# Patient Record
Sex: FEMALE | Race: WHITE | NOT HISPANIC OR LATINO | Employment: OTHER | URBAN - NONMETROPOLITAN AREA
[De-identification: names, ages, dates, MRNs, and addresses within clinical notes are randomized per-mention and may not be internally consistent; named-entity substitution may affect disease eponyms.]

---

## 2017-03-06 ENCOUNTER — HOSPITAL ENCOUNTER (EMERGENCY)
Facility: HOSPITAL | Age: 82
Discharge: HOME | End: 2017-03-06
Attending: EMERGENCY MEDICINE | Admitting: EMERGENCY MEDICINE

## 2017-03-06 ENCOUNTER — APPOINTMENT (OUTPATIENT)
Dept: CT IMAGING | Facility: HOSPITAL | Age: 82
End: 2017-03-06

## 2017-03-06 VITALS
DIASTOLIC BLOOD PRESSURE: 71 MMHG | SYSTOLIC BLOOD PRESSURE: 148 MMHG | HEART RATE: 68 BPM | TEMPERATURE: 98.1 F | BODY MASS INDEX: 26.94 KG/M2 | RESPIRATION RATE: 18 BRPM | HEIGHT: 62 IN | OXYGEN SATURATION: 98 % | WEIGHT: 146.38 LBS
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
## 2017-03-06 DIAGNOSIS — S00.03XA SCALP CONTUSION: ICD-10-CM

## 2017-03-06 LAB
ALBUMIN SERPL-MCNC: 3.7 G/DL (ref 3.4–4.8)
ALBUMIN/GLOB SERPL: 1.4 G/DL (ref 1.5–2.5)
ALP SERPL-CCNC: 52 U/L (ref 35–104)
ALT SERPL W P-5'-P-CCNC: 13 U/L (ref 10–36)
ANION GAP SERPL CALCULATED.3IONS-SCNC: 2.1 MMOL/L (ref 3.6–11.2)
AST SERPL-CCNC: 21 U/L (ref 10–30)
BASOPHILS # BLD AUTO: 0.03 10*3/MM3 (ref 0–0.3)
BASOPHILS NFR BLD AUTO: 0.7 % (ref 0–2)
BILIRUB SERPL-MCNC: 0.3 MG/DL (ref 0.2–1.8)
BUN BLD-MCNC: 10 MG/DL (ref 7–21)
BUN/CREAT SERPL: 14.7 (ref 7–25)
CALCIUM SPEC-SCNC: 9.5 MG/DL (ref 7.7–10)
CHLORIDE SERPL-SCNC: 102 MMOL/L (ref 99–112)
CO2 SERPL-SCNC: 34.9 MMOL/L (ref 24.3–31.9)
CREAT BLD-MCNC: 0.68 MG/DL (ref 0.43–1.29)
DEPRECATED RDW RBC AUTO: 43.3 FL (ref 37–54)
EOSINOPHIL # BLD AUTO: 0.16 10*3/MM3 (ref 0–0.7)
EOSINOPHIL NFR BLD AUTO: 3.7 % (ref 0–7)
ERYTHROCYTE [DISTWIDTH] IN BLOOD BY AUTOMATED COUNT: 13.2 % (ref 11.5–14.5)
GFR SERPL CREATININE-BSD FRML MDRD: 83 ML/MIN/1.73
GLOBULIN UR ELPH-MCNC: 2.6 GM/DL
GLUCOSE BLD-MCNC: 82 MG/DL (ref 70–110)
HCT VFR BLD AUTO: 38.7 % (ref 37–47)
HGB BLD-MCNC: 12.3 G/DL (ref 12–16)
IMM GRANULOCYTES # BLD: 0.01 10*3/MM3 (ref 0–0.03)
IMM GRANULOCYTES NFR BLD: 0.2 % (ref 0–0.5)
INR PPP: 0.93 (ref 0.8–1.1)
LYMPHOCYTES # BLD AUTO: 1.42 10*3/MM3 (ref 1–3)
LYMPHOCYTES NFR BLD AUTO: 32.6 % (ref 16–46)
MCH RBC QN AUTO: 29.3 PG (ref 27–33)
MCHC RBC AUTO-ENTMCNC: 31.8 G/DL (ref 33–37)
MCV RBC AUTO: 92.1 FL (ref 80–94)
MONOCYTES # BLD AUTO: 0.5 10*3/MM3 (ref 0.1–0.9)
MONOCYTES NFR BLD AUTO: 11.5 % (ref 0–12)
NEUTROPHILS # BLD AUTO: 2.23 10*3/MM3 (ref 1.4–6.5)
NEUTROPHILS NFR BLD AUTO: 51.3 % (ref 40–75)
OSMOLALITY SERPL CALC.SUM OF ELEC: 275.7 MOSM/KG (ref 273–305)
PLATELET # BLD AUTO: 242 10*3/MM3 (ref 130–400)
PMV BLD AUTO: 8.2 FL (ref 6–10)
POTASSIUM BLD-SCNC: 3.9 MMOL/L (ref 3.5–5.3)
PROT SERPL-MCNC: 6.3 G/DL (ref 6–8)
PROTHROMBIN TIME: 10.3 SECONDS (ref 9.8–11.9)
RBC # BLD AUTO: 4.2 10*6/MM3 (ref 4.2–5.4)
SODIUM BLD-SCNC: 139 MMOL/L (ref 135–153)
WBC NRBC COR # BLD: 4.35 10*3/MM3 (ref 4.5–12.5)

## 2017-03-06 PROCEDURE — 99284 EMERGENCY DEPT VISIT MOD MDM: CPT

## 2017-03-06 PROCEDURE — 70450 CT HEAD/BRAIN W/O DYE: CPT

## 2017-03-06 PROCEDURE — 85610 PROTHROMBIN TIME: CPT | Performed by: EMERGENCY MEDICINE

## 2017-03-06 PROCEDURE — 80053 COMPREHEN METABOLIC PANEL: CPT | Performed by: EMERGENCY MEDICINE

## 2017-03-06 PROCEDURE — 72125 CT NECK SPINE W/O DYE: CPT | Performed by: RADIOLOGY

## 2017-03-06 PROCEDURE — 72125 CT NECK SPINE W/O DYE: CPT

## 2017-03-06 PROCEDURE — 36415 COLL VENOUS BLD VENIPUNCTURE: CPT

## 2017-03-06 PROCEDURE — 85025 COMPLETE CBC W/AUTO DIFF WBC: CPT | Performed by: EMERGENCY MEDICINE

## 2017-03-06 PROCEDURE — 70450 CT HEAD/BRAIN W/O DYE: CPT | Performed by: RADIOLOGY

## 2017-03-06 NOTE — ED NOTES
[facility] transported back to [facility]      Avinash Newell, ANTWAN  03/06/17 [id number]

## 2017-03-06 NOTE — ED NOTES
Attempted to call report to [facility]. States is busy and will call back.      Avinash Newell RN  03/06/17 3797

## 2017-03-06 NOTE — ED NOTES
0355: Aliyah from [facility] called back for report. Report given at this time.      Avinash Newell RN  03/06/17 0355

## 2017-03-06 NOTE — DISCHARGE INSTRUCTIONS

## 2017-03-08 NOTE — ED PROVIDER NOTES
Subjective   Patient is a 82 y.o. female presenting with fall.   Fall   Mechanism of injury: fall    Injury location:  Head/neck  Head/neck injury location:  Scalp  Incident location:  Bathroom  Arrived directly from scene: yes    Fall:     Fall occurred: Sitting On Toilet.    Impact surface:  Wall    Point of impact:  Head    Entrapped after fall: no    Protective equipment: none    Suspicion of alcohol use: no    Suspicion of drug use: no    Tetanus status:  Up to date  Prior to arrival data:     Bystander interventions:  None    Patient ambulatory at scene: yes      Blood loss:  None    Responsiveness at scene:  Alert    Orientation at scene:  Person    Loss of consciousness: no      Amnesic to event: no      Airway interventions:  None    Breathing interventions:  None    IV access status:  None    IO access:  None    Fluids administered:  None    Cardiac interventions:  None    Medications administered:  None    Immobilization:  None    Airway condition since incident:  Stable    Breathing condition since incident:  Stable    Circulation condition since incident:  Stable    Mental status condition since incident:  Stable    Disability condition since incident:  Stable  Associated symptoms: headaches and nausea    Associated symptoms: no abdominal pain, no back pain, no blindness, no chest pain, no difficulty breathing, no hearing loss, no neck pain, no seizures and no vomiting    Headaches:     Severity:  Mild    Onset quality:  Gradual    Timing:  Constant    Progression:  Improving    Chronicity:  New  Nausea:     Severity:  Mild    Onset quality:  Gradual    Timing:  Intermittent    Progression:  Partially resolved      Review of Systems   Constitutional: Negative for activity change, appetite change, chills, diaphoresis, fatigue and fever.   HENT: Negative for congestion, ear pain, hearing loss and sore throat.    Eyes: Negative for blindness and redness.   Respiratory: Negative for cough, chest tightness,  shortness of breath and wheezing.    Cardiovascular: Negative for chest pain, palpitations and leg swelling.   Gastrointestinal: Positive for nausea. Negative for abdominal pain, diarrhea and vomiting.   Genitourinary: Negative for dysuria and urgency.   Musculoskeletal: Negative for arthralgias, back pain, myalgias and neck pain.   Skin: Negative for pallor, rash and wound.   Neurological: Positive for headaches. Negative for dizziness, seizures, speech difficulty and weakness.   Psychiatric/Behavioral: Negative for agitation, behavioral problems, confusion and decreased concentration.       Past Medical History   Diagnosis Date   • Allergic rhinitis    • Arthritis    • COPD (chronic obstructive pulmonary disease)    • Dementia    • Depression    • Diverticulitis    • Dysphagia    • GERD (gastroesophageal reflux disease)    • Hypertension        Allergies   Allergen Reactions   • Contrast Dye    • Penicillins    • Sulfa Antibiotics        Past Surgical History   Procedure Laterality Date   • Hysterectomy     • Dental procedure     • Tonsillectomy     • Cholecystectomy     • Appendectomy     • Hemorrhoidectomy         Family History   Problem Relation Age of Onset   • No Known Problems Mother    • No Known Problems Father    • No Known Problems Sister    • No Known Problems Brother    • No Known Problems Son    • No Known Problems Daughter    • No Known Problems Maternal Grandmother    • No Known Problems Maternal Grandfather    • No Known Problems Paternal Grandmother    • No Known Problems Paternal Grandfather    • No Known Problems Cousin    • Rheum arthritis Neg Hx    • Osteoarthritis Neg Hx    • Asthma Neg Hx    • Diabetes Neg Hx    • Heart failure Neg Hx    • Hyperlipidemia Neg Hx    • Hypertension Neg Hx    • Migraines Neg Hx    • Rashes / Skin problems Neg Hx    • Seizures Neg Hx    • Stroke Neg Hx    • Thyroid disease Neg Hx        Social History     Social History   • Marital status:      Spouse  name: N/A   • Number of children: N/A   • Years of education: N/A     Social History Main Topics   • Smoking status: Former Smoker     Types: Cigarettes   • Smokeless tobacco: None   • Alcohol use No   • Drug use: No   • Sexual activity: Defer     Other Topics Concern   • None     Social History Narrative   • None           Objective   Physical Exam   Constitutional: She appears well-developed and well-nourished.  Non-toxic appearance. No distress.   HENT:   Head: Normocephalic. Head is with abrasion and with contusion.       Right Ear: External ear normal.   Left Ear: External ear normal.   Nose: Nose normal.   Mouth/Throat: Oropharynx is clear and moist and mucous membranes are normal. No oropharyngeal exudate. No tonsillar exudate.   Eyes: Conjunctivae, EOM and lids are normal. Pupils are equal, round, and reactive to light.   Neck: Normal range of motion and full passive range of motion without pain. Neck supple. No thyromegaly present.   Cardiovascular: Normal rate, regular rhythm, S1 normal, S2 normal, normal heart sounds, intact distal pulses and normal pulses.    Pulmonary/Chest: Effort normal and breath sounds normal. No tachypnea. No respiratory distress. She has no decreased breath sounds. She has no wheezes. She has no rales. She exhibits no tenderness.   Abdominal: Soft. Normal appearance and bowel sounds are normal. She exhibits no distension. There is no tenderness. There is no rebound and no guarding.   Musculoskeletal: Normal range of motion. She exhibits no edema, tenderness or deformity.   Lymphadenopathy:     She has no cervical adenopathy.   Neurological: She is alert. She has normal strength. No cranial nerve deficit or sensory deficit. GCS eye subscore is 4. GCS verbal subscore is 5. GCS motor subscore is 6.   Skin: Skin is warm, dry and intact. No rash noted. She is not diaphoretic. No erythema. No pallor.   Psychiatric: Her speech is normal. Cognition and memory are normal.   Nursing note  and vitals reviewed.      Procedures         ED Course  ED Course   Value Comment By Time   CT Cervical Spine Without Contrast IMPRESSION:  No acute fracture of the cervical spine.  Ike Wolfe MD 03/08 1340   CT Head Without Contrast IMPRESSION:  Atrophy and chronic small vessel ischemic change, but there  are no acute intracranial abnormalities identified.  Ike Wolfe MD 03/08 1341                  MDM  Number of Diagnoses or Management Options  Fall, initial encounter: new and requires workup  Scalp contusion: new and requires workup     Amount and/or Complexity of Data Reviewed  Tests in the radiology section of CPT®: ordered and reviewed  Independent visualization of images, tracings, or specimens: yes    Risk of Complications, Morbidity, and/or Mortality  Presenting problems: low  Diagnostic procedures: low  Management options: low    Patient Progress  Patient progress: stable      Final diagnoses:   Fall, initial encounter   Scalp contusion            Ike Wolfe MD  03/08/17 8573

## 2017-09-05 ENCOUNTER — TRANSCRIBE ORDERS (OUTPATIENT)
Dept: ADMINISTRATIVE | Facility: HOSPITAL | Age: 82
End: 2017-09-05

## 2017-09-05 DIAGNOSIS — M54.5 LOW BACK PAIN, UNSPECIFIED BACK PAIN LATERALITY, UNSPECIFIED CHRONICITY, WITH SCIATICA PRESENCE UNSPECIFIED: Primary | ICD-10-CM

## 2017-09-13 ENCOUNTER — HOSPITAL ENCOUNTER (OUTPATIENT)
Dept: CT IMAGING | Facility: HOSPITAL | Age: 82
Discharge: HOME | End: 2017-09-13
Attending: INTERNAL MEDICINE | Admitting: INTERNAL MEDICINE

## 2017-09-13 DIAGNOSIS — M54.5 LOW BACK PAIN, UNSPECIFIED BACK PAIN LATERALITY, UNSPECIFIED CHRONICITY, WITH SCIATICA PRESENCE UNSPECIFIED: ICD-10-CM

## 2017-09-13 PROCEDURE — 72131 CT LUMBAR SPINE W/O DYE: CPT

## 2017-09-13 PROCEDURE — 72131 CT LUMBAR SPINE W/O DYE: CPT | Performed by: RADIOLOGY

## 2017-11-29 ENCOUNTER — LAB REQUISITION (OUTPATIENT)
Dept: LAB | Facility: HOSPITAL | Age: 82
End: 2017-11-29

## 2017-11-29 DIAGNOSIS — R10.9 ABDOMINAL PAIN: ICD-10-CM

## 2017-11-29 DIAGNOSIS — D64.9 ANEMIA: ICD-10-CM

## 2017-11-29 DIAGNOSIS — R19.7 DIARRHEA: ICD-10-CM

## 2017-11-29 LAB
027 TOXIN: NORMAL
C DIFF TOX GENS STL QL NAA+PROBE: NEGATIVE
HEMOCCULT STL QL: NEGATIVE

## 2017-11-29 PROCEDURE — 87045 FECES CULTURE AEROBIC BACT: CPT | Performed by: INTERNAL MEDICINE

## 2017-11-29 PROCEDURE — 87899 AGENT NOS ASSAY W/OPTIC: CPT | Performed by: INTERNAL MEDICINE

## 2017-11-29 PROCEDURE — 87209 SMEAR COMPLEX STAIN: CPT | Performed by: INTERNAL MEDICINE

## 2017-11-29 PROCEDURE — 87493 C DIFF AMPLIFIED PROBE: CPT | Performed by: INTERNAL MEDICINE

## 2017-11-29 PROCEDURE — 87177 OVA AND PARASITES SMEARS: CPT | Performed by: INTERNAL MEDICINE

## 2017-11-29 PROCEDURE — 87046 STOOL CULTR AEROBIC BACT EA: CPT | Performed by: INTERNAL MEDICINE

## 2017-11-29 PROCEDURE — 82272 OCCULT BLD FECES 1-3 TESTS: CPT | Performed by: INTERNAL MEDICINE

## 2017-12-01 LAB
BACTERIA SPEC AEROBE CULT: NORMAL
O+P SPEC MICRO: NORMAL
OVA + PARASITE RESULT 1: NORMAL

## 2017-12-15 ENCOUNTER — LAB REQUISITION (OUTPATIENT)
Dept: LAB | Facility: HOSPITAL | Age: 82
End: 2017-12-15

## 2017-12-15 DIAGNOSIS — J02.9 ACUTE PHARYNGITIS: ICD-10-CM

## 2017-12-15 LAB — S PYO AG THROAT QL: NEGATIVE

## 2017-12-15 PROCEDURE — 87081 CULTURE SCREEN ONLY: CPT | Performed by: INTERNAL MEDICINE

## 2017-12-15 PROCEDURE — 87880 STREP A ASSAY W/OPTIC: CPT | Performed by: INTERNAL MEDICINE

## 2017-12-17 LAB — BACTERIA SPEC AEROBE CULT: NORMAL

## 2018-01-01 ENCOUNTER — APPOINTMENT (OUTPATIENT)
Dept: GENERAL RADIOLOGY | Facility: HOSPITAL | Age: 83
End: 2018-01-01

## 2018-01-01 ENCOUNTER — OFFICE VISIT (OUTPATIENT)
Dept: GASTROENTEROLOGY | Facility: CLINIC | Age: 83
End: 2018-01-01

## 2018-01-01 ENCOUNTER — HOSPITAL ENCOUNTER (OUTPATIENT)
Dept: GENERAL RADIOLOGY | Facility: HOSPITAL | Age: 83
Discharge: HOME | End: 2018-08-07
Attending: INTERNAL MEDICINE | Admitting: INTERNAL MEDICINE

## 2018-01-01 ENCOUNTER — HOSPITAL ENCOUNTER (EMERGENCY)
Facility: HOSPITAL | Age: 83
Discharge: HOME | End: 2018-10-03
Attending: EMERGENCY MEDICINE | Admitting: EMERGENCY MEDICINE

## 2018-01-01 ENCOUNTER — APPOINTMENT (OUTPATIENT)
Dept: CT IMAGING | Facility: HOSPITAL | Age: 83
End: 2018-01-01

## 2018-01-01 ENCOUNTER — TELEPHONE (OUTPATIENT)
Dept: GASTROENTEROLOGY | Facility: CLINIC | Age: 83
End: 2018-01-01

## 2018-01-01 ENCOUNTER — CONSULT (OUTPATIENT)
Dept: GASTROENTEROLOGY | Facility: CLINIC | Age: 83
End: 2018-01-01

## 2018-01-01 ENCOUNTER — ANESTHESIA (OUTPATIENT)
Dept: PERIOP | Facility: HOSPITAL | Age: 83
End: 2018-01-01

## 2018-01-01 ENCOUNTER — HOSPITAL ENCOUNTER (OUTPATIENT)
Dept: GENERAL RADIOLOGY | Facility: HOSPITAL | Age: 83
Discharge: HOME | End: 2018-04-27
Admitting: PHYSICIAN ASSISTANT

## 2018-01-01 ENCOUNTER — ANESTHESIA EVENT (OUTPATIENT)
Dept: PERIOP | Facility: HOSPITAL | Age: 83
End: 2018-01-01

## 2018-01-01 ENCOUNTER — TRANSCRIBE ORDERS (OUTPATIENT)
Dept: ADMINISTRATIVE | Facility: HOSPITAL | Age: 83
End: 2018-01-01

## 2018-01-01 VITALS
HEART RATE: 74 BPM | WEIGHT: 116 LBS | DIASTOLIC BLOOD PRESSURE: 61 MMHG | HEIGHT: 62 IN | SYSTOLIC BLOOD PRESSURE: 118 MMHG | BODY MASS INDEX: 21.35 KG/M2 | OXYGEN SATURATION: 92 %

## 2018-01-01 VITALS
OXYGEN SATURATION: 100 % | DIASTOLIC BLOOD PRESSURE: 69 MMHG | SYSTOLIC BLOOD PRESSURE: 112 MMHG | RESPIRATION RATE: 20 BRPM | HEART RATE: 82 BPM | TEMPERATURE: 98.7 F

## 2018-01-01 VITALS
HEART RATE: 77 BPM | BODY MASS INDEX: 20.8 KG/M2 | DIASTOLIC BLOOD PRESSURE: 73 MMHG | WEIGHT: 113 LBS | HEIGHT: 62 IN | SYSTOLIC BLOOD PRESSURE: 136 MMHG

## 2018-01-01 VITALS
DIASTOLIC BLOOD PRESSURE: 74 MMHG | TEMPERATURE: 98 F | HEART RATE: 64 BPM | WEIGHT: 115.6 LBS | SYSTOLIC BLOOD PRESSURE: 125 MMHG | RESPIRATION RATE: 18 BRPM | OXYGEN SATURATION: 98 % | HEIGHT: 63 IN | BODY MASS INDEX: 20.48 KG/M2

## 2018-01-01 DIAGNOSIS — R13.10 DYSPHAGIA, UNSPECIFIED TYPE: Primary | ICD-10-CM

## 2018-01-01 DIAGNOSIS — Z87.19 HISTORY OF HIATAL HERNIA: ICD-10-CM

## 2018-01-01 DIAGNOSIS — R63.4 WEIGHT LOSS, ABNORMAL: ICD-10-CM
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
## 2018-01-01 DIAGNOSIS — R13.19 ESOPHAGEAL DYSPHAGIA: ICD-10-CM

## 2018-01-01 DIAGNOSIS — R13.10 DYSPHAGIA, UNSPECIFIED TYPE: ICD-10-CM

## 2018-01-01 DIAGNOSIS — R63.0 POOR APPETITE: ICD-10-CM

## 2018-01-01 DIAGNOSIS — S09.90XA INJURY OF HEAD, INITIAL ENCOUNTER: ICD-10-CM

## 2018-01-01 DIAGNOSIS — R10.84 GENERALIZED ABDOMINAL PAIN: ICD-10-CM

## 2018-01-01 DIAGNOSIS — K21.9 GASTROESOPHAGEAL REFLUX DISEASE, ESOPHAGITIS PRESENCE NOT SPECIFIED: ICD-10-CM

## 2018-01-01 DIAGNOSIS — M54.50 ACUTE LOW BACK PAIN WITHOUT SCIATICA, UNSPECIFIED BACK PAIN LATERALITY: Primary | ICD-10-CM

## 2018-01-01 DIAGNOSIS — R19.7 DIARRHEA, UNSPECIFIED TYPE: ICD-10-CM

## 2018-01-01 DIAGNOSIS — R15.9 INCONTINENCE OF FECES WITH FECAL URGENCY: ICD-10-CM

## 2018-01-01 DIAGNOSIS — R11.2 INTRACTABLE VOMITING WITH NAUSEA, UNSPECIFIED VOMITING TYPE: ICD-10-CM

## 2018-01-01 DIAGNOSIS — R11.2 INTRACTABLE VOMITING WITH NAUSEA, UNSPECIFIED VOMITING TYPE: Primary | ICD-10-CM

## 2018-01-01 DIAGNOSIS — R15.2 INCONTINENCE OF FECES WITH FECAL URGENCY: ICD-10-CM

## 2018-01-01 DIAGNOSIS — K22.4 ESOPHAGEAL DYSMOTILITY: Primary | ICD-10-CM

## 2018-01-01 LAB
ANION GAP SERPL CALCULATED.3IONS-SCNC: 10 MMOL/L (ref 3.6–11.2)
ANION GAP SERPL CALCULATED.3IONS-SCNC: 21 MMOL/L (ref 3.6–11.2)
BASOPHILS # BLD AUTO: 0.01 10*3/MM3 (ref 0–0.3)
BASOPHILS NFR BLD AUTO: 0.2 % (ref 0–2)
BUN BLD-MCNC: 11 MG/DL (ref 7–21)
BUN BLD-MCNC: 13 MG/DL (ref 7–21)
BUN/CREAT SERPL: 18.6 (ref 7–25)
BUN/CREAT SERPL: 22.8 (ref 7–25)
CALCIUM SPEC-SCNC: 8.6 MG/DL (ref 7.7–10)
CALCIUM SPEC-SCNC: 8.8 MG/DL (ref 7.7–10)
CHLORIDE SERPL-SCNC: 104 MMOL/L (ref 99–112)
CHLORIDE SERPL-SCNC: 93 MMOL/L (ref 99–112)
CO2 SERPL-SCNC: 24 MMOL/L (ref 24.3–31.9)
CO2 SERPL-SCNC: 24 MMOL/L (ref 24.3–31.9)
CREAT BLD-MCNC: 0.57 MG/DL (ref 0.43–1.29)
CREAT BLD-MCNC: 0.59 MG/DL (ref 0.43–1.29)
CRP SERPL-MCNC: <0.5 MG/DL (ref 0–0.99)
DEPRECATED RDW RBC AUTO: 44.9 FL (ref 37–54)
DEPRECATED RDW RBC AUTO: 45.1 FL (ref 37–54)
EOSINOPHIL # BLD AUTO: 0.03 10*3/MM3 (ref 0–0.7)
EOSINOPHIL NFR BLD AUTO: 0.7 % (ref 0–7)
ERYTHROCYTE [DISTWIDTH] IN BLOOD BY AUTOMATED COUNT: 13.9 % (ref 11.5–14.5)
ERYTHROCYTE [DISTWIDTH] IN BLOOD BY AUTOMATED COUNT: 14.6 % (ref 11.5–14.5)
GFR SERPL CREATININE-BSD FRML MDRD: 102 ML/MIN/1.73
GFR SERPL CREATININE-BSD FRML MDRD: 97 ML/MIN/1.73
GLUCOSE BLD-MCNC: 74 MG/DL (ref 70–110)
GLUCOSE BLD-MCNC: 76 MG/DL (ref 70–110)
GLUCOSE BLDC GLUCOMTR-MCNC: 69 MG/DL (ref 70–130)
GLUCOSE BLDC GLUCOMTR-MCNC: 80 MG/DL (ref 70–130)
GLUCOSE BLDC GLUCOMTR-MCNC: 82 MG/DL (ref 70–130)
GLUCOSE BLDC GLUCOMTR-MCNC: 84 MG/DL (ref 70–130)
GLUCOSE BLDC GLUCOMTR-MCNC: 85 MG/DL (ref 70–130)
GLUCOSE BLDC GLUCOMTR-MCNC: 89 MG/DL (ref 70–130)
GLUCOSE BLDC GLUCOMTR-MCNC: 96 MG/DL (ref 70–130)
GLUCOSE BLDC GLUCOMTR-MCNC: 96 MG/DL (ref 70–130)
GLUCOSE BLDC GLUCOMTR-MCNC: 99 MG/DL (ref 70–130)
HCT VFR BLD AUTO: 33.7 % (ref 37–47)
HCT VFR BLD AUTO: 36.5 % (ref 37–47)
HGB BLD-MCNC: 10.9 G/DL (ref 12–16)
HGB BLD-MCNC: 11.4 G/DL (ref 12–16)
IMM GRANULOCYTES # BLD: 0.01 10*3/MM3 (ref 0–0.03)
IMM GRANULOCYTES NFR BLD: 0.2 % (ref 0–0.5)
LAB AP CASE REPORT: NORMAL
LYMPHOCYTES # BLD AUTO: 1.5 10*3/MM3 (ref 1–3)
LYMPHOCYTES NFR BLD AUTO: 36.9 % (ref 16–46)
Lab: NORMAL
MAGNESIUM SERPL-MCNC: 1.5 MG/DL (ref 1.7–2.6)
MCH RBC QN AUTO: 28.1 PG (ref 27–33)
MCH RBC QN AUTO: 28.6 PG (ref 27–33)
MCHC RBC AUTO-ENTMCNC: 31.2 G/DL (ref 33–37)
MCHC RBC AUTO-ENTMCNC: 32.3 G/DL (ref 33–37)
MCV RBC AUTO: 88.5 FL (ref 80–94)
MCV RBC AUTO: 90.1 FL (ref 80–94)
MONOCYTES # BLD AUTO: 0.5 10*3/MM3 (ref 0.1–0.9)
MONOCYTES NFR BLD AUTO: 12.3 % (ref 0–12)
NEUTROPHILS # BLD AUTO: 2.02 10*3/MM3 (ref 1.4–6.5)
NEUTROPHILS NFR BLD AUTO: 49.7 % (ref 40–75)
OSMOLALITY SERPL CALC.SUM OF ELEC: 273.8 MOSM/KG (ref 273–305)
OSMOLALITY SERPL CALC.SUM OF ELEC: 274.4 MOSM/KG (ref 273–305)
PATH REPORT.FINAL DX SPEC: NORMAL
PHOSPHATE SERPL-MCNC: 3.3 MG/DL (ref 2.7–4.5)
PLATELET # BLD AUTO: 211 10*3/MM3 (ref 130–400)
PLATELET # BLD AUTO: 222 10*3/MM3 (ref 130–400)
PMV BLD AUTO: 10 FL (ref 6–10)
PMV BLD AUTO: 10.9 FL (ref 6–10)
POTASSIUM BLD-SCNC: 3 MMOL/L (ref 3.5–5.3)
POTASSIUM BLD-SCNC: 3.5 MMOL/L (ref 3.5–5.3)
POTASSIUM BLD-SCNC: 3.5 MMOL/L (ref 3.5–5.3)
POTASSIUM BLD-SCNC: 3.7 MMOL/L (ref 3.5–5.3)
POTASSIUM BLD-SCNC: 3.7 MMOL/L (ref 3.5–5.3)
RBC # BLD AUTO: 3.81 10*6/MM3 (ref 4.2–5.4)
RBC # BLD AUTO: 4.05 10*6/MM3 (ref 4.2–5.4)
SODIUM BLD-SCNC: 138 MMOL/L (ref 135–153)
SODIUM BLD-SCNC: 138 MMOL/L (ref 135–153)
WBC NRBC COR # BLD: 4.07 10*3/MM3 (ref 4.5–12.5)
WBC NRBC COR # BLD: 7.7 10*3/MM3 (ref 4.5–12.5)

## 2018-01-01 PROCEDURE — 97530 THERAPEUTIC ACTIVITIES: CPT

## 2018-01-01 PROCEDURE — 80048 BASIC METABOLIC PNL TOTAL CA: CPT | Performed by: INTERNAL MEDICINE

## 2018-01-01 PROCEDURE — 99214 OFFICE O/P EST MOD 30 MIN: CPT | Performed by: PHYSICIAN ASSISTANT

## 2018-01-01 PROCEDURE — 80048 BASIC METABOLIC PNL TOTAL CA: CPT | Performed by: NURSE PRACTITIONER

## 2018-01-01 PROCEDURE — 84132 ASSAY OF SERUM POTASSIUM: CPT | Performed by: INTERNAL MEDICINE

## 2018-01-01 PROCEDURE — 85025 COMPLETE CBC W/AUTO DIFF WBC: CPT | Performed by: NURSE PRACTITIONER

## 2018-01-01 PROCEDURE — 74230 X-RAY XM SWLNG FUNCJ C+: CPT

## 2018-01-01 PROCEDURE — 99213 OFFICE O/P EST LOW 20 MIN: CPT | Performed by: PHYSICIAN ASSISTANT

## 2018-01-01 PROCEDURE — 74018 RADEX ABDOMEN 1 VIEW: CPT

## 2018-01-01 PROCEDURE — G8996 SWALLOW CURRENT STATUS: HCPCS

## 2018-01-01 PROCEDURE — 25010000002 ONDANSETRON PER 1 MG: Performed by: EMERGENCY MEDICINE

## 2018-01-01 PROCEDURE — 97116 GAIT TRAINING THERAPY: CPT

## 2018-01-01 PROCEDURE — 25010000002 PROPOFOL 1000 MG/ML EMULSION: Performed by: NURSE ANESTHETIST, CERTIFIED REGISTERED

## 2018-01-01 PROCEDURE — G8998 SWALLOW D/C STATUS: HCPCS

## 2018-01-01 PROCEDURE — 25010000002 MORPHINE SULFATE (PF) 2 MG/ML SOLUTION: Performed by: EMERGENCY MEDICINE

## 2018-01-01 PROCEDURE — 83735 ASSAY OF MAGNESIUM: CPT | Performed by: INTERNAL MEDICINE

## 2018-01-01 PROCEDURE — G8997 SWALLOW GOAL STATUS: HCPCS

## 2018-01-01 PROCEDURE — 82962 GLUCOSE BLOOD TEST: CPT

## 2018-01-01 PROCEDURE — 43248 EGD GUIDE WIRE INSERTION: CPT | Performed by: INTERNAL MEDICINE

## 2018-01-01 PROCEDURE — 85027 COMPLETE CBC AUTOMATED: CPT | Performed by: INTERNAL MEDICINE

## 2018-01-01 PROCEDURE — 72170 X-RAY EXAM OF PELVIS: CPT

## 2018-01-01 PROCEDURE — 74220 X-RAY XM ESOPHAGUS 1CNTRST: CPT

## 2018-01-01 PROCEDURE — 70450 CT HEAD/BRAIN W/O DYE: CPT | Performed by: RADIOLOGY

## 2018-01-01 PROCEDURE — 74230 X-RAY XM SWLNG FUNCJ C+: CPT | Performed by: RADIOLOGY

## 2018-01-01 PROCEDURE — 74220 X-RAY XM ESOPHAGUS 1CNTRST: CPT | Performed by: RADIOLOGY

## 2018-01-01 PROCEDURE — 84100 ASSAY OF PHOSPHORUS: CPT | Performed by: INTERNAL MEDICINE

## 2018-01-01 PROCEDURE — 71045 X-RAY EXAM CHEST 1 VIEW: CPT

## 2018-01-01 PROCEDURE — 72170 X-RAY EXAM OF PELVIS: CPT | Performed by: RADIOLOGY

## 2018-01-01 PROCEDURE — 70450 CT HEAD/BRAIN W/O DYE: CPT

## 2018-01-01 PROCEDURE — 72110 X-RAY EXAM L-2 SPINE 4/>VWS: CPT | Performed by: RADIOLOGY

## 2018-01-01 PROCEDURE — 88305 TISSUE EXAM BY PATHOLOGIST: CPT | Performed by: INTERNAL MEDICINE

## 2018-01-01 PROCEDURE — 99284 EMERGENCY DEPT VISIT MOD MDM: CPT

## 2018-01-01 PROCEDURE — 92611 MOTION FLUOROSCOPY/SWALLOW: CPT

## 2018-01-01 PROCEDURE — 72110 X-RAY EXAM L-2 SPINE 4/>VWS: CPT

## 2018-01-01 PROCEDURE — 86140 C-REACTIVE PROTEIN: CPT | Performed by: NURSE PRACTITIONER

## 2018-01-01 PROCEDURE — 88312 SPECIAL STAINS GROUP 1: CPT | Performed by: INTERNAL MEDICINE

## 2018-01-01 PROCEDURE — 45378 DIAGNOSTIC COLONOSCOPY: CPT | Performed by: INTERNAL MEDICINE

## 2018-01-01 PROCEDURE — 71045 X-RAY EXAM CHEST 1 VIEW: CPT | Performed by: RADIOLOGY

## 2018-01-01 PROCEDURE — 74018 RADEX ABDOMEN 1 VIEW: CPT | Performed by: RADIOLOGY

## 2018-01-01 PROCEDURE — 43239 EGD BIOPSY SINGLE/MULTIPLE: CPT | Performed by: INTERNAL MEDICINE

## 2018-01-01 PROCEDURE — 96372 THER/PROPH/DIAG INJ SC/IM: CPT

## 2018-01-01 RX ORDER — ONDANSETRON 2 MG/ML
4 INJECTION INTRAMUSCULAR; INTRAVENOUS ONCE
Status: COMPLETED | OUTPATIENT
Start: 2018-01-01 | End: 2018-01-01

## 2018-01-01 RX ORDER — SODIUM CHLORIDE 0.9 % (FLUSH) 0.9 %
1-10 SYRINGE (ML) INJECTION AS NEEDED
Status: DISCONTINUED | OUTPATIENT
Start: 2018-01-01 | End: 2018-01-01 | Stop reason: HOSPADM

## 2018-01-01 RX ORDER — MORPHINE SULFATE 2 MG/ML
4 INJECTION, SOLUTION INTRAMUSCULAR; INTRAVENOUS ONCE
Status: COMPLETED | OUTPATIENT
Start: 2018-01-01 | End: 2018-01-01

## 2018-01-01 RX ORDER — IPRATROPIUM BROMIDE AND ALBUTEROL SULFATE 2.5; .5 MG/3ML; MG/3ML
3 SOLUTION RESPIRATORY (INHALATION) ONCE AS NEEDED
Status: DISCONTINUED | OUTPATIENT
Start: 2018-01-01 | End: 2018-01-01 | Stop reason: HOSPADM

## 2018-01-01 RX ORDER — MEPERIDINE HYDROCHLORIDE 25 MG/ML
12.5 INJECTION INTRAMUSCULAR; INTRAVENOUS; SUBCUTANEOUS
Status: DISCONTINUED | OUTPATIENT
Start: 2018-01-01 | End: 2018-01-01 | Stop reason: HOSPADM

## 2018-01-01 RX ORDER — MORPHINE SULFATE 2 MG/ML
2 INJECTION, SOLUTION INTRAMUSCULAR; INTRAVENOUS ONCE
Status: DISCONTINUED | OUTPATIENT
Start: 2018-01-01 | End: 2018-01-01

## 2018-01-01 RX ORDER — METOPROLOL SUCCINATE 25 MG/1
12.5 TABLET, EXTENDED RELEASE ORAL DAILY
COMMUNITY

## 2018-01-01 RX ORDER — FENTANYL CITRATE 50 UG/ML
50 INJECTION, SOLUTION INTRAMUSCULAR; INTRAVENOUS
Status: DISCONTINUED | OUTPATIENT
Start: 2018-01-01 | End: 2018-01-01 | Stop reason: HOSPADM

## 2018-01-01 RX ORDER — OXYCODONE HYDROCHLORIDE AND ACETAMINOPHEN 5; 325 MG/1; MG/1
1 TABLET ORAL ONCE AS NEEDED
Status: DISCONTINUED | OUTPATIENT
Start: 2018-01-01 | End: 2018-01-01 | Stop reason: HOSPADM

## 2018-01-01 RX ORDER — SODIUM CHLORIDE, SODIUM LACTATE, POTASSIUM CHLORIDE, CALCIUM CHLORIDE 600; 310; 30; 20 MG/100ML; MG/100ML; MG/100ML; MG/100ML
125 INJECTION, SOLUTION INTRAVENOUS CONTINUOUS
Status: DISCONTINUED | OUTPATIENT
Start: 2018-01-01 | End: 2018-01-01 | Stop reason: HOSPADM

## 2018-01-01 RX ORDER — ONDANSETRON 2 MG/ML
4 INJECTION INTRAMUSCULAR; INTRAVENOUS ONCE AS NEEDED
Status: DISCONTINUED | OUTPATIENT
Start: 2018-01-01 | End: 2018-01-01 | Stop reason: HOSPADM

## 2018-01-01 RX ORDER — ONDANSETRON 2 MG/ML
4 INJECTION INTRAMUSCULAR; INTRAVENOUS ONCE
Status: DISCONTINUED | OUTPATIENT
Start: 2018-01-01 | End: 2018-01-01

## 2018-01-01 RX ORDER — RANITIDINE 300 MG/1
300 TABLET ORAL 2 TIMES DAILY
Qty: 60 TABLET | Refills: 5 | Status: SHIPPED | OUTPATIENT
Start: 2018-01-01

## 2018-01-01 RX ADMIN — MORPHINE SULFATE 4 MG: 2 INJECTION, SOLUTION INTRAMUSCULAR; INTRAVENOUS at 13:48

## 2018-01-01 RX ADMIN — ONDANSETRON 4 MG: 2 INJECTION, SOLUTION INTRAMUSCULAR; INTRAVENOUS at 13:48

## 2018-01-01 RX ADMIN — PROPOFOL 100 MCG/KG/MIN: 10 INJECTION, EMULSION INTRAVENOUS at 15:44

## 2018-01-22 ENCOUNTER — APPOINTMENT (OUTPATIENT)
Dept: CT IMAGING | Facility: HOSPITAL | Age: 83
End: 2018-01-22

## 2018-01-22 ENCOUNTER — HOSPITAL ENCOUNTER (INPATIENT)
Facility: HOSPITAL | Age: 83
LOS: 5 days | Discharge: NURSING, REHAB OR LONG-TERM CARE | End: 2018-01-27
Attending: EMERGENCY MEDICINE | Admitting: INTERNAL MEDICINE

## 2018-01-22 ENCOUNTER — APPOINTMENT (OUTPATIENT)
Dept: GENERAL RADIOLOGY | Facility: HOSPITAL | Age: 83
End: 2018-01-22

## 2018-01-22 DIAGNOSIS — A41.9 SEPSIS, DUE TO UNSPECIFIED ORGANISM: ICD-10-CM

## 2018-01-22 DIAGNOSIS — J96.02 ACUTE RESPIRATORY FAILURE WITH HYPOXIA AND HYPERCAPNIA (HCC): ICD-10-CM

## 2018-01-22 DIAGNOSIS — J18.9 PNEUMONIA OF LEFT LOWER LOBE DUE TO INFECTIOUS ORGANISM: ICD-10-CM

## 2018-01-22 DIAGNOSIS — K94.23 MALFUNCTION OF GASTROSTOMY TUBE (HCC): ICD-10-CM

## 2018-01-22 DIAGNOSIS — R41.82 ALTERED MENTAL STATUS, UNSPECIFIED ALTERED MENTAL STATUS TYPE: ICD-10-CM

## 2018-01-22 DIAGNOSIS — T85.528A DISLODGED GASTROSTOMY TUBE: Primary | ICD-10-CM

## 2018-01-22 DIAGNOSIS — J96.01 ACUTE RESPIRATORY FAILURE WITH HYPOXIA AND HYPERCAPNIA (HCC): ICD-10-CM

## 2018-01-22 LAB
027 TOXIN: NORMAL
A-A DO2: 123.3 MMHG (ref 0–300)
A-A DO2: 194 MMHG (ref 0–300)
A-A DO2: 45.7 MMHG (ref 0–300)
ALBUMIN SERPL-MCNC: 3.6 G/DL (ref 3.4–4.8)
ALBUMIN/GLOB SERPL: 1.4 G/DL (ref 1.5–2.5)
ALP SERPL-CCNC: 68 U/L (ref 35–104)
ALT SERPL W P-5'-P-CCNC: 20 U/L (ref 10–36)
ANION GAP SERPL CALCULATED.3IONS-SCNC: 7 MMOL/L (ref 3.6–11.2)
ARTERIAL PATENCY WRIST A: ABNORMAL
AST SERPL-CCNC: 23 U/L (ref 10–30)
ATMOSPHERIC PRESS: 724 MMHG
BASE EXCESS BLDA CALC-SCNC: -3.7 MMOL/L
BASE EXCESS BLDA CALC-SCNC: -4.1 MMOL/L
BASE EXCESS BLDA CALC-SCNC: -7.8 MMOL/L
BASOPHILS # BLD AUTO: 0.01 10*3/MM3 (ref 0–0.3)
BASOPHILS NFR BLD AUTO: 0.2 % (ref 0–2)
BDY SITE: ABNORMAL
BILIRUB SERPL-MCNC: 0.3 MG/DL (ref 0.2–1.8)
BILIRUB UR QL STRIP: NEGATIVE
BNP SERPL-MCNC: 85 PG/ML (ref 0–100)
BODY TEMPERATURE: 98.6 C
BUN BLD-MCNC: 22 MG/DL (ref 7–21)
BUN/CREAT SERPL: 18.8 (ref 7–25)
C DIFF TOX GENS STL QL NAA+PROBE: NEGATIVE
CALCIUM SPEC-SCNC: 8.7 MG/DL (ref 7.7–10)
CHLORIDE SERPL-SCNC: 105 MMOL/L (ref 99–112)
CK MB SERPL-CCNC: 5.84 NG/ML (ref 0–5)
CK MB SERPL-RTO: 2.1 % (ref 0–3)
CK SERPL-CCNC: 276 U/L (ref 24–173)
CK SERPL-CCNC: 276 U/L (ref 24–173)
CLARITY UR: CLEAR
CO2 SERPL-SCNC: 23 MMOL/L (ref 24.3–31.9)
COHGB MFR BLD: 2 % (ref 0–5)
COHGB MFR BLD: 2.3 % (ref 0–5)
COHGB MFR BLD: 2.4 % (ref 0–5)
COLOR UR: YELLOW
CREAT BLD-MCNC: 1.17 MG/DL (ref 0.43–1.29)
D-LACTATE SERPL-SCNC: 0.7 MMOL/L (ref 0.5–2)
DEPRECATED RDW RBC AUTO: 46 FL (ref 37–54)
DEVELOPER EXPIRATION DATE: NORMAL
DEVELOPER LOT NUMBER: NORMAL
EOSINOPHIL # BLD AUTO: 0.02 10*3/MM3 (ref 0–0.7)
EOSINOPHIL NFR BLD AUTO: 0.3 % (ref 0–7)
ERYTHROCYTE [DISTWIDTH] IN BLOOD BY AUTOMATED COUNT: 14.1 % (ref 11.5–14.5)
EXPIRATION DATE: NORMAL
FECAL OCCULT BLOOD SCREEN, POC: NEGATIVE
FLUAV AG NPH QL: NEGATIVE
FLUBV AG NPH QL IA: NEGATIVE
GFR SERPL CREATININE-BSD FRML MDRD: 44 ML/MIN/1.73
GLOBULIN UR ELPH-MCNC: 2.5 GM/DL
GLUCOSE BLD-MCNC: 106 MG/DL (ref 70–110)
GLUCOSE BLDC GLUCOMTR-MCNC: 117 MG/DL (ref 70–130)
GLUCOSE UR STRIP-MCNC: NEGATIVE MG/DL
HBA1C MFR BLD: 4.7 % (ref 4.5–5.7)
HCO3 BLDA-SCNC: 19.4 MMOL/L (ref 22–26)
HCO3 BLDA-SCNC: 22.6 MMOL/L (ref 22–26)
HCO3 BLDA-SCNC: 22.7 MMOL/L (ref 22–26)
HCT VFR BLD AUTO: 39.5 % (ref 37–47)
HCT VFR BLD CALC: 35 % (ref 37–47)
HCT VFR BLD CALC: 38 % (ref 37–47)
HCT VFR BLD CALC: 39 % (ref 37–47)
HGB BLD-MCNC: 12.5 G/DL (ref 12–16)
HGB BLDA-MCNC: 12 G/DL (ref 12–16)
HGB BLDA-MCNC: 13 G/DL (ref 12–16)
HGB BLDA-MCNC: 13.1 G/DL (ref 12–16)
HGB UR QL STRIP.AUTO: NEGATIVE
HOLD SPECIMEN: NORMAL
HOLD SPECIMEN: NORMAL
HOROWITZ INDEX BLD+IHG-RTO: 21 %
HOROWITZ INDEX BLD+IHG-RTO: 35 %
HOROWITZ INDEX BLD+IHG-RTO: 50 %
IMM GRANULOCYTES # BLD: 0.01 10*3/MM3 (ref 0–0.03)
IMM GRANULOCYTES NFR BLD: 0.2 % (ref 0–0.5)
KETONES UR QL STRIP: ABNORMAL
LACTOFERRIN STL QL LA: POSITIVE
LEUKOCYTE ESTERASE UR QL STRIP.AUTO: NEGATIVE
LYMPHOCYTES # BLD AUTO: 0.52 10*3/MM3 (ref 1–3)
LYMPHOCYTES NFR BLD AUTO: 8.4 % (ref 16–46)
Lab: NORMAL
M PNEUMO IGM SER QL: NEGATIVE
MCH RBC QN AUTO: 28.6 PG (ref 27–33)
MCHC RBC AUTO-ENTMCNC: 31.6 G/DL (ref 33–37)
MCV RBC AUTO: 90.4 FL (ref 80–94)
METHGB BLD QL: 0.3 % (ref 0–3)
METHGB BLD QL: 0.6 % (ref 0–3)
METHGB BLD QL: 0.8 % (ref 0–3)
MODALITY: ABNORMAL
MONOCYTES # BLD AUTO: 0.64 10*3/MM3 (ref 0.1–0.9)
MONOCYTES NFR BLD AUTO: 10.3 % (ref 0–12)
NEGATIVE CONTROL: NEGATIVE
NEUTROPHILS # BLD AUTO: 5.01 10*3/MM3 (ref 1.4–6.5)
NEUTROPHILS NFR BLD AUTO: 80.6 % (ref 40–75)
NITRITE UR QL STRIP: NEGATIVE
OSMOLALITY SERPL CALC.SUM OF ELEC: 273.8 MOSM/KG (ref 273–305)
OXYHGB MFR BLDV: 71 % (ref 85–100)
OXYHGB MFR BLDV: 87.4 % (ref 85–100)
OXYHGB MFR BLDV: 93.8 % (ref 85–100)
PCO2 BLDA: 46.2 MM HG (ref 35–45)
PCO2 BLDA: 46.4 MM HG (ref 35–45)
PCO2 BLDA: 47.5 MM HG (ref 35–45)
PH BLDA: 7.24 PH UNITS (ref 7.35–7.45)
PH BLDA: 7.29 PH UNITS (ref 7.35–7.45)
PH BLDA: 7.31 PH UNITS (ref 7.35–7.45)
PH UR STRIP.AUTO: <=5 [PH] (ref 5–8)
PLATELET # BLD AUTO: 204 10*3/MM3 (ref 130–400)
PMV BLD AUTO: 9.2 FL (ref 6–10)
PO2 BLDA: 39.6 MM HG (ref 80–100)
PO2 BLDA: 59.9 MM HG (ref 80–100)
PO2 BLDA: 92.3 MM HG (ref 80–100)
POSITIVE CONTROL: POSITIVE
POTASSIUM BLD-SCNC: 3.6 MMOL/L (ref 3.5–5.3)
PROT SERPL-MCNC: 6.1 G/DL (ref 6–8)
PROT UR QL STRIP: NEGATIVE
RBC # BLD AUTO: 4.37 10*6/MM3 (ref 4.2–5.4)
SAO2 % BLDCOA: 72.9 % (ref 90–100)
SAO2 % BLDCOA: 90.2 % (ref 90–100)
SAO2 % BLDCOA: 96.4 % (ref 90–100)
SODIUM BLD-SCNC: 135 MMOL/L (ref 135–153)
SP GR UR STRIP: 1.02 (ref 1–1.03)
T3FREE SERPL-MCNC: 1.9 PG/ML (ref 2.3–4.2)
T4 FREE SERPL-MCNC: 0.98 NG/DL (ref 0.89–1.76)
TROPONIN I SERPL-MCNC: 0.21 NG/ML
TSH SERPL DL<=0.05 MIU/L-ACNC: 0.27 MIU/ML (ref 0.55–4.78)
UROBILINOGEN UR QL STRIP: ABNORMAL
WBC NRBC COR # BLD: 6.21 10*3/MM3 (ref 4.5–12.5)
WHOLE BLOOD HOLD SPECIMEN: NORMAL
WHOLE BLOOD HOLD SPECIMEN: NORMAL

## 2018-01-22 PROCEDURE — 87209 SMEAR COMPLEX STAIN: CPT | Performed by: EMERGENCY MEDICINE

## 2018-01-22 PROCEDURE — 94660 CPAP INITIATION&MGMT: CPT

## 2018-01-22 PROCEDURE — 83880 ASSAY OF NATRIURETIC PEPTIDE: CPT | Performed by: HOSPITALIST

## 2018-01-22 PROCEDURE — 74176 CT ABD & PELVIS W/O CONTRAST: CPT

## 2018-01-22 PROCEDURE — 87046 STOOL CULTR AEROBIC BACT EA: CPT | Performed by: EMERGENCY MEDICINE

## 2018-01-22 PROCEDURE — 71045 X-RAY EXAM CHEST 1 VIEW: CPT

## 2018-01-22 PROCEDURE — 82270 OCCULT BLOOD FECES: CPT | Performed by: EMERGENCY MEDICINE

## 2018-01-22 PROCEDURE — 87493 C DIFF AMPLIFIED PROBE: CPT | Performed by: EMERGENCY MEDICINE

## 2018-01-22 PROCEDURE — 87899 AGENT NOS ASSAY W/OPTIC: CPT | Performed by: EMERGENCY MEDICINE

## 2018-01-22 PROCEDURE — 83036 HEMOGLOBIN GLYCOSYLATED A1C: CPT | Performed by: INTERNAL MEDICINE

## 2018-01-22 PROCEDURE — 82550 ASSAY OF CK (CPK): CPT | Performed by: INTERNAL MEDICINE

## 2018-01-22 PROCEDURE — 82375 ASSAY CARBOXYHB QUANT: CPT | Performed by: PHYSICIAN ASSISTANT

## 2018-01-22 PROCEDURE — 87804 INFLUENZA ASSAY W/OPTIC: CPT | Performed by: EMERGENCY MEDICINE

## 2018-01-22 PROCEDURE — 94799 UNLISTED PULMONARY SVC/PX: CPT

## 2018-01-22 PROCEDURE — 87177 OVA AND PARASITES SMEARS: CPT | Performed by: EMERGENCY MEDICINE

## 2018-01-22 PROCEDURE — 70450 CT HEAD/BRAIN W/O DYE: CPT | Performed by: RADIOLOGY

## 2018-01-22 PROCEDURE — 70450 CT HEAD/BRAIN W/O DYE: CPT

## 2018-01-22 PROCEDURE — 80053 COMPREHEN METABOLIC PANEL: CPT | Performed by: EMERGENCY MEDICINE

## 2018-01-22 PROCEDURE — 81003 URINALYSIS AUTO W/O SCOPE: CPT | Performed by: EMERGENCY MEDICINE

## 2018-01-22 PROCEDURE — 87045 FECES CULTURE AEROBIC BACT: CPT | Performed by: EMERGENCY MEDICINE

## 2018-01-22 PROCEDURE — 36415 COLL VENOUS BLD VENIPUNCTURE: CPT

## 2018-01-22 PROCEDURE — 82962 GLUCOSE BLOOD TEST: CPT

## 2018-01-22 PROCEDURE — 93010 ELECTROCARDIOGRAM REPORT: CPT | Performed by: INTERNAL MEDICINE

## 2018-01-22 PROCEDURE — 05H533Z INSERTION OF INFUSION DEVICE INTO RIGHT SUBCLAVIAN VEIN, PERCUTANEOUS APPROACH: ICD-10-PCS | Performed by: INTERNAL MEDICINE

## 2018-01-22 PROCEDURE — 84484 ASSAY OF TROPONIN QUANT: CPT | Performed by: INTERNAL MEDICINE

## 2018-01-22 PROCEDURE — 85025 COMPLETE CBC W/AUTO DIFF WBC: CPT | Performed by: EMERGENCY MEDICINE

## 2018-01-22 PROCEDURE — 25010000002 METHYLPREDNISOLONE PER 40 MG: Performed by: INTERNAL MEDICINE

## 2018-01-22 PROCEDURE — 86738 MYCOPLASMA ANTIBODY: CPT | Performed by: INTERNAL MEDICINE

## 2018-01-22 PROCEDURE — 83050 HGB METHEMOGLOBIN QUAN: CPT | Performed by: PHYSICIAN ASSISTANT

## 2018-01-22 PROCEDURE — 84443 ASSAY THYROID STIM HORMONE: CPT | Performed by: INTERNAL MEDICINE

## 2018-01-22 PROCEDURE — 83631 LACTOFERRIN FECAL (QUANT): CPT | Performed by: EMERGENCY MEDICINE

## 2018-01-22 PROCEDURE — 83605 ASSAY OF LACTIC ACID: CPT | Performed by: EMERGENCY MEDICINE

## 2018-01-22 PROCEDURE — 84439 ASSAY OF FREE THYROXINE: CPT | Performed by: HOSPITALIST

## 2018-01-22 PROCEDURE — 82375 ASSAY CARBOXYHB QUANT: CPT | Performed by: EMERGENCY MEDICINE

## 2018-01-22 PROCEDURE — 84481 FREE ASSAY (FT-3): CPT | Performed by: HOSPITALIST

## 2018-01-22 PROCEDURE — 36600 WITHDRAWAL OF ARTERIAL BLOOD: CPT | Performed by: PHYSICIAN ASSISTANT

## 2018-01-22 PROCEDURE — 83050 HGB METHEMOGLOBIN QUAN: CPT | Performed by: EMERGENCY MEDICINE

## 2018-01-22 PROCEDURE — 36600 WITHDRAWAL OF ARTERIAL BLOOD: CPT | Performed by: EMERGENCY MEDICINE

## 2018-01-22 PROCEDURE — 25010000002 HEPARIN (PORCINE) PER 1000 UNITS: Performed by: INTERNAL MEDICINE

## 2018-01-22 PROCEDURE — 82805 BLOOD GASES W/O2 SATURATION: CPT | Performed by: PHYSICIAN ASSISTANT

## 2018-01-22 PROCEDURE — 74176 CT ABD & PELVIS W/O CONTRAST: CPT | Performed by: RADIOLOGY

## 2018-01-22 PROCEDURE — 25010000002 VANCOMYCIN PER 500 MG: Performed by: EMERGENCY MEDICINE

## 2018-01-22 PROCEDURE — 82805 BLOOD GASES W/O2 SATURATION: CPT | Performed by: EMERGENCY MEDICINE

## 2018-01-22 PROCEDURE — 71045 X-RAY EXAM CHEST 1 VIEW: CPT | Performed by: RADIOLOGY

## 2018-01-22 PROCEDURE — 87040 BLOOD CULTURE FOR BACTERIA: CPT | Performed by: EMERGENCY MEDICINE

## 2018-01-22 PROCEDURE — 99223 1ST HOSP IP/OBS HIGH 75: CPT | Performed by: HOSPITALIST

## 2018-01-22 PROCEDURE — 82553 CREATINE MB FRACTION: CPT | Performed by: INTERNAL MEDICINE

## 2018-01-22 PROCEDURE — 93005 ELECTROCARDIOGRAM TRACING: CPT | Performed by: PHYSICIAN ASSISTANT

## 2018-01-22 PROCEDURE — 99285 EMERGENCY DEPT VISIT HI MDM: CPT

## 2018-01-22 RX ORDER — OSELTAMIVIR PHOSPHATE 75 MG/1
75 CAPSULE ORAL DAILY
Status: CANCELLED | OUTPATIENT
Start: 2018-01-23 | End: 2018-01-01

## 2018-01-22 RX ORDER — ASPIRIN 81 MG/1
81 TABLET ORAL DAILY
Status: CANCELLED | OUTPATIENT
Start: 2018-01-23

## 2018-01-22 RX ORDER — BACLOFEN 10 MG/1
10 TABLET ORAL NIGHTLY
Status: CANCELLED | OUTPATIENT
Start: 2018-01-22

## 2018-01-22 RX ORDER — IPRATROPIUM BROMIDE AND ALBUTEROL SULFATE 2.5; .5 MG/3ML; MG/3ML
3 SOLUTION RESPIRATORY (INHALATION)
Status: DISCONTINUED | OUTPATIENT
Start: 2018-01-23 | End: 2018-01-27 | Stop reason: HOSPADM

## 2018-01-22 RX ORDER — ESCITALOPRAM OXALATE 5 MG/1
2.5 TABLET ORAL DAILY
COMMUNITY

## 2018-01-22 RX ORDER — BUDESONIDE AND FORMOTEROL FUMARATE DIHYDRATE 160; 4.5 UG/1; UG/1
2 AEROSOL RESPIRATORY (INHALATION)
Status: CANCELLED | OUTPATIENT
Start: 2018-01-22

## 2018-01-22 RX ORDER — MELATONIN
1000 DAILY
COMMUNITY

## 2018-01-22 RX ORDER — METHYLPREDNISOLONE SODIUM SUCCINATE 40 MG/ML
40 INJECTION, POWDER, LYOPHILIZED, FOR SOLUTION INTRAMUSCULAR; INTRAVENOUS EVERY 12 HOURS
Status: DISCONTINUED | OUTPATIENT
Start: 2018-01-22 | End: 2018-01-25

## 2018-01-22 RX ORDER — BUSPIRONE HYDROCHLORIDE 5 MG/1
5 TABLET ORAL
Status: CANCELLED | OUTPATIENT
Start: 2018-01-22

## 2018-01-22 RX ORDER — CELECOXIB 200 MG/1
200 CAPSULE ORAL DAILY
COMMUNITY
End: 2018-01-27 | Stop reason: HOSPADM

## 2018-01-22 RX ORDER — GABAPENTIN 300 MG/1
600 CAPSULE ORAL EVERY 8 HOURS
Status: CANCELLED | OUTPATIENT
Start: 2018-01-22

## 2018-01-22 RX ORDER — ONDANSETRON 4 MG/1
4 TABLET, FILM COATED ORAL EVERY 6 HOURS PRN
Status: ON HOLD | COMMUNITY
End: 2019-01-01

## 2018-01-22 RX ORDER — BISACODYL 10 MG
10 SUPPOSITORY, RECTAL RECTAL DAILY PRN
COMMUNITY

## 2018-01-22 RX ORDER — BUSPIRONE HYDROCHLORIDE 5 MG/1
5 TABLET ORAL
Status: ON HOLD | COMMUNITY
End: 2019-01-01

## 2018-01-22 RX ORDER — LOPERAMIDE HYDROCHLORIDE 2 MG/1
2 CAPSULE ORAL EVERY 6 HOURS PRN
COMMUNITY
End: 2018-01-27 | Stop reason: HOSPADM

## 2018-01-22 RX ORDER — DOCUSATE SODIUM 250 MG
250 CAPSULE ORAL EVERY 12 HOURS PRN
Status: CANCELLED | OUTPATIENT
Start: 2018-01-22

## 2018-01-22 RX ORDER — OSELTAMIVIR PHOSPHATE 75 MG/1
75 CAPSULE ORAL DAILY
COMMUNITY
End: 2018-01-27 | Stop reason: HOSPADM

## 2018-01-22 RX ORDER — NALOXONE HYDROCHLORIDE 1 MG/ML
INJECTION INTRAMUSCULAR; INTRAVENOUS; SUBCUTANEOUS
Status: COMPLETED
Start: 2018-01-22 | End: 2018-01-22

## 2018-01-22 RX ORDER — DOCUSATE SODIUM 250 MG
250 CAPSULE ORAL EVERY 12 HOURS PRN
Status: ON HOLD | COMMUNITY
End: 2019-01-01

## 2018-01-22 RX ORDER — DEXTROSE MONOHYDRATE 25 G/50ML
25 INJECTION, SOLUTION INTRAVENOUS
Status: DISCONTINUED | OUTPATIENT
Start: 2018-01-22 | End: 2018-01-27 | Stop reason: HOSPADM

## 2018-01-22 RX ORDER — L. ACIDOPHILUS/L.BULGARICUS 1MM CELL
1 TABLET ORAL DAILY
COMMUNITY

## 2018-01-22 RX ORDER — NYSTATIN 100000 [USP'U]/G
POWDER TOPICAL EVERY 12 HOURS SCHEDULED
Status: DISCONTINUED | OUTPATIENT
Start: 2018-01-22 | End: 2018-01-23

## 2018-01-22 RX ORDER — SODIUM CHLORIDE 0.9 % (FLUSH) 0.9 %
1-10 SYRINGE (ML) INJECTION AS NEEDED
Status: DISCONTINUED | OUTPATIENT
Start: 2018-01-22 | End: 2018-01-27 | Stop reason: HOSPADM

## 2018-01-22 RX ORDER — ACETAMINOPHEN 650 MG/1
650 SUPPOSITORY RECTAL ONCE
Status: COMPLETED | OUTPATIENT
Start: 2018-01-22 | End: 2018-01-22

## 2018-01-22 RX ORDER — KETOTIFEN FUMARATE 0.35 MG/ML
1 SOLUTION/ DROPS OPHTHALMIC 2 TIMES DAILY
Status: CANCELLED | OUTPATIENT
Start: 2018-01-22

## 2018-01-22 RX ORDER — LORATADINE 10 MG/1
10 TABLET ORAL NIGHTLY
Status: ON HOLD | COMMUNITY
End: 2019-01-01

## 2018-01-22 RX ORDER — HEPARIN SODIUM 5000 [USP'U]/ML
5000 INJECTION, SOLUTION INTRAVENOUS; SUBCUTANEOUS EVERY 12 HOURS SCHEDULED
Status: CANCELLED | OUTPATIENT
Start: 2018-01-22

## 2018-01-22 RX ORDER — BUPRENORPHINE 15 UG/H
1 PATCH TRANSDERMAL
COMMUNITY
End: 2018-01-27 | Stop reason: HOSPADM

## 2018-01-22 RX ORDER — SODIUM CHLORIDE 0.9 % (FLUSH) 0.9 %
1-10 SYRINGE (ML) INJECTION AS NEEDED
Status: CANCELLED | OUTPATIENT
Start: 2018-01-22

## 2018-01-22 RX ORDER — HEPARIN SODIUM 5000 [USP'U]/ML
5000 INJECTION, SOLUTION INTRAVENOUS; SUBCUTANEOUS EVERY 12 HOURS SCHEDULED
Status: DISCONTINUED | OUTPATIENT
Start: 2018-01-22 | End: 2018-01-27 | Stop reason: HOSPADM

## 2018-01-22 RX ORDER — SODIUM CHLORIDE 9 MG/ML
100 INJECTION, SOLUTION INTRAVENOUS CONTINUOUS
Status: DISCONTINUED | OUTPATIENT
Start: 2018-01-22 | End: 2018-01-23

## 2018-01-22 RX ORDER — IPRATROPIUM BROMIDE AND ALBUTEROL SULFATE 2.5; .5 MG/3ML; MG/3ML
3 SOLUTION RESPIRATORY (INHALATION) EVERY 4 HOURS PRN
Status: DISCONTINUED | OUTPATIENT
Start: 2018-01-22 | End: 2018-01-22

## 2018-01-22 RX ORDER — NICOTINE POLACRILEX 4 MG
15 LOZENGE BUCCAL
Status: DISCONTINUED | OUTPATIENT
Start: 2018-01-22 | End: 2018-01-27 | Stop reason: HOSPADM

## 2018-01-22 RX ORDER — ACETAMINOPHEN 500 MG
500 TABLET ORAL EVERY 4 HOURS PRN
COMMUNITY

## 2018-01-22 RX ORDER — NITROGLYCERIN 0.4 MG/1
0.4 TABLET SUBLINGUAL
Status: CANCELLED | OUTPATIENT
Start: 2018-01-22

## 2018-01-22 RX ORDER — SODIUM CHLORIDE 0.9 % (FLUSH) 0.9 %
10 SYRINGE (ML) INJECTION AS NEEDED
Status: DISCONTINUED | OUTPATIENT
Start: 2018-01-22 | End: 2018-01-27 | Stop reason: HOSPADM

## 2018-01-22 RX ORDER — GABAPENTIN 600 MG/1
600 TABLET ORAL EVERY 8 HOURS
COMMUNITY
End: 2018-01-27 | Stop reason: HOSPADM

## 2018-01-22 RX ORDER — METHYLPREDNISOLONE SODIUM SUCCINATE 40 MG/ML
40 INJECTION, POWDER, LYOPHILIZED, FOR SOLUTION INTRAMUSCULAR; INTRAVENOUS EVERY 12 HOURS
Status: CANCELLED | OUTPATIENT
Start: 2018-01-22

## 2018-01-22 RX ORDER — ACETAMINOPHEN 325 MG/1
487.5 TABLET ORAL EVERY 4 HOURS PRN
Status: CANCELLED | OUTPATIENT
Start: 2018-01-22

## 2018-01-22 RX ORDER — LOPERAMIDE HYDROCHLORIDE 2 MG/1
2 CAPSULE ORAL EVERY 6 HOURS PRN
Status: CANCELLED | OUTPATIENT
Start: 2018-01-22

## 2018-01-22 RX ORDER — CELECOXIB 200 MG/1
200 CAPSULE ORAL DAILY
Status: CANCELLED | OUTPATIENT
Start: 2018-01-23

## 2018-01-22 RX ORDER — NALOXONE HYDROCHLORIDE 1 MG/ML
2 INJECTION INTRAMUSCULAR; INTRAVENOUS; SUBCUTANEOUS ONCE
Status: COMPLETED | OUTPATIENT
Start: 2018-01-22 | End: 2018-01-22

## 2018-01-22 RX ORDER — ASPIRIN 81 MG/1
81 TABLET ORAL DAILY
Status: DISCONTINUED | OUTPATIENT
Start: 2018-01-23 | End: 2018-01-27 | Stop reason: HOSPADM

## 2018-01-22 RX ORDER — L. ACIDOPHILUS/L.BULGARICUS 1MM CELL
1 TABLET ORAL DAILY
Status: CANCELLED | OUTPATIENT
Start: 2018-01-23

## 2018-01-22 RX ORDER — HYDROCODONE BITARTRATE AND ACETAMINOPHEN 7.5; 325 MG/1; MG/1
1 TABLET ORAL EVERY 12 HOURS PRN
Status: CANCELLED | OUTPATIENT
Start: 2018-01-22

## 2018-01-22 RX ADMIN — METRONIDAZOLE 500 MG: 500 INJECTION, SOLUTION INTRAVENOUS at 21:24

## 2018-01-22 RX ADMIN — AZTREONAM 2 G: 2 INJECTION, POWDER, FOR SOLUTION INTRAMUSCULAR; INTRAVENOUS at 21:23

## 2018-01-22 RX ADMIN — HEPARIN SODIUM 5000 UNITS: 5000 INJECTION, SOLUTION INTRAVENOUS; SUBCUTANEOUS at 21:24

## 2018-01-22 RX ADMIN — VANCOMYCIN HYDROCHLORIDE 1250 MG: 5 INJECTION, POWDER, LYOPHILIZED, FOR SOLUTION INTRAVENOUS at 11:07

## 2018-01-22 RX ADMIN — SODIUM CHLORIDE 1000 ML: 9 INJECTION, SOLUTION INTRAVENOUS at 10:28

## 2018-01-22 RX ADMIN — AZTREONAM 2 G: 2 INJECTION, POWDER, FOR SOLUTION INTRAMUSCULAR; INTRAVENOUS at 23:39

## 2018-01-22 RX ADMIN — SODIUM CHLORIDE 100 ML/HR: 9 INJECTION, SOLUTION INTRAVENOUS at 21:30

## 2018-01-22 RX ADMIN — NALOXONE HYDROCHLORIDE 2 MG: 1 INJECTION INTRAMUSCULAR; INTRAVENOUS; SUBCUTANEOUS at 09:12

## 2018-01-22 RX ADMIN — NALOXONE HYDROCHLORIDE 2 MG: 1 INJECTION PARENTERAL at 09:12

## 2018-01-22 RX ADMIN — METHYLPREDNISOLONE SODIUM SUCCINATE 40 MG: 40 INJECTION, POWDER, FOR SOLUTION INTRAMUSCULAR; INTRAVENOUS at 21:22

## 2018-01-22 RX ADMIN — AZTREONAM 2 G: 2 INJECTION, POWDER, FOR SOLUTION INTRAMUSCULAR; INTRAVENOUS at 10:28

## 2018-01-22 RX ADMIN — ACETAMINOPHEN 650 MG: 650 SUPPOSITORY RECTAL at 10:28

## 2018-01-22 RX ADMIN — NYSTATIN: 100000 POWDER TOPICAL at 23:39

## 2018-01-22 NOTE — ED NOTES
Pt resting quietly on stretcher with no complaints.  Pt neuro status continues to be improved at this time, pt able to appropriately assist with movement with no resp distress noted, respirations even and unlabored.  Pt denies any needs at this time.  Skin PWD.  Pt family at bedside. Will continue to monitor and follow plan of care.  Bed rails up x2, bed in lowest position, call light in reach.       Fatou Melissa RN  01/22/18 5169

## 2018-01-22 NOTE — ED NOTES
Pt beginning to have more movement of legs and arms, pt reassured that she is at the hospital and being cared for.  Pt calmed.       Fatou Melissa RN  01/22/18 9216

## 2018-01-22 NOTE — ED NOTES
"Pt had large black/green tinged bowel movement at this time, pt cleansed at this time.   Upon rolling patient, pt noted to have a \"butrans\" patch to the mid shoulder region.  MD notified and patch removed.       Fatou Melissa RN  01/22/18 9644    "

## 2018-01-22 NOTE — ED NOTES
Pt resting quietly on stretcher with no complaints.  Pt neuro status continues to be improved at this time, pt able to appropriately assist with movement with no resp distress noted, respirations even and unlabored.  Pt denies any needs at this time.  Skin PWD.  Pt family at bedside. Will continue to monitor and follow plan of care.  Bed rails up x2, bed in lowest position, call light in reach.     Fatou Melissa RN  01/22/18 1604

## 2018-01-22 NOTE — ED NOTES
Pt will arouse when spoken to, will open eyes and look at staff and talk coherently.  Pt neuro status continues to improve.     Fatou Melissa RN  01/22/18 8387

## 2018-01-22 NOTE — ED NOTES
Pt resting quietly on stretcher with no complaints.  Pt neuro status continues to be improved at this time, pt able to appropriately assist with movement with no resp distress noted, respirations even and unlabored.  Pt denies any needs at this time.  Skin PWD.  Pt family at bedside. Will continue to monitor and follow plan of care.  Bed rails up x2, bed in lowest position, call light in reach.     Fatou Melissa RN  01/22/18 9336

## 2018-01-22 NOTE — ED NOTES
Pt resting quietly on stretcher with no complaints.  Pt neuro status remains improved at this time, when spoken to patient awakens and informs that her right arm is hurting while moving it, pt informed that she has a central line in her right subclavian region, pt VU, with no resp distress noted, respirations even and unlabored.  Pt denies any needs at this time.  Skin PWD.  Pt family at bedside. Will continue to monitor and follow plan of care.  Bed rails up x2, bed in lowest position, call light in reach.         Fatou Melissa RN  01/22/18 4142

## 2018-01-22 NOTE — PROGRESS NOTES
Discharge Planning Assessment   [place]     Patient Name: Amy Do  MRN: [id number]  Today's Date: 1/22/2018    Admit Date: 1/22/2018          Discharge Needs Assessment       01/22/18 1711    Living Environment    Lives With facility resident    Living Arrangements residential facility    Transportation Available ambulance    Discharge Needs Assessment    Concerns To Be Addressed transportation;discharge planning concerns    Readmission Within The Last 30 Days no previous admission in last 30 days    Outpatient/Agency/Support Group Needs skilled nursing facility (specify)    Community Agency Name(S) [facility]    Discharge Disposition nursing facility    Discharge Planning Comments SOCIAL SERVICE CONSULT PLACED RT DISCHARGE PLANNING. PT IS A RESIDENT OF [facility]. PT IS BEING ADMITTED TO TELE TO DR BORGES FOR ALTERED MENTAL STATUS AND LOW O2 SATS.   VS: 103.1,  HR SUSTAINED, 24, 145/65, 69%  CO: AMS, LOW O2 SATS  ER TX: TYLENOL, NARCAN IV, AZACTAM IV, VANC IV, NS BOLUS 1000 ML, LINDA-MASK 50%, BIPAP  LABS: WBC 6.2, BUN 22, FECAL LACTOFERRIN POSITIVE  CT ABD: LEFT SIDE PNEUMONIA  ABG: PH7.30, PCO2 46.2, PO2 59.9  HX: COPD, DEMENTIA, HTN  FLOOR ORDERS: CONSULT TO PULMONOLOGY, CARDIAC MONITORING, CONT PULSE OX, O2 TITRATE, ABG/EKG PRN, NPO, BEDREST, TROPONIN Q 6 HRS, BMP/CBC IN AM, FS AC AND HS WITH SSIC, BIPAP, AZACTAM IV, FLAGYL IV, NEURO CHECKS Q 4 HRS, VANC IV              Discharge Plan       01/22/18 1620    Case Management/Social Work Plan    Plan Pt is a resident of [facility] and has a 14 day bed hold per Emmanuelle.  SS will continue to follow.      Patient/Family In Agreement With Plan yes        Discharge Placement     Facility/Agency Request Status Selected? Address Phone Number Fax Number    [facility] Pending - No Request Sent     [address] [place] [address] [phone]                Demographic Summary       01/22/18 1718     Referral Information    Admission Type inpatient    Arrived From skilled nursing facility    Referral Source admission list;emergency department    Reason For Consult discharge planning;transportation    Record Reviewed history and physical;medical record;patient profile    Primary Care Physician Information    Name DR CORRAL            Functional Status       01/22/18 1710    Functional Status Current    Ambulation 4-->completely dependent    Transferring 4-->completely dependent    Toileting 4-->completely dependent    Bathing 4-->completely dependent    Dressing 4-->completely dependent    Eating 4-->completely dependent    Current Functional Level Comment PER NURSING ASSESSMENT             Psychosocial                 Abuse/Neglect                 Legal       01/22/18 1711    Legal    Legal Comments SEE NURSING NOTE CONCERNING POA AND ADVANCED DIRECTIVE.             Substance Abuse                 Patient Forms               Carolina Guzmán RN

## 2018-01-22 NOTE — ED NOTES
Pt resting quietly on stretcher with no complaints.  Pt neuro status continues to be improved at this time, pt able to appropriately assist with movement with no resp distress noted, respirations even and unlabored.  Pt denies any needs at this time.  Skin PWD.  Pt family at bedside. Will continue to monitor and follow plan of care.  Bed rails up x2, bed in lowest position, call light in reach.       Fatou Melissa RN  01/22/18 5783

## 2018-01-22 NOTE — H&P
"Patient Identification:  Name:  Amy Do  Age:  82 y.o.  Sex:  female  :  1935  MRN:  [id number]   Visit Number:  [id number]  Primary Care Physician:  Hank Cabral MD    I have seen the patient in conjunction with Vandana Jansen PA-C and I agree with the following statements:     Chief complaint: Altered mental status    History of presenting illness:  82 y.o. female patient of a local nursing home that presented to the ED via EMS with altered mental status.  This did reportedly improve with Narcan.  She was initially 69% on nasal cannula at the time of presentation and placed on venti-mask. PH did initially improve from 7.29 to 7.30  Then it fell to 7.24 after 3 hours on 50% ventimask. PCO2 remained elevated but unchanged, while bicarb had dropped to 19.4. Mrs. Do would squeeze eyes shut tightly when trying to examine them. She would not follow commands. She did eventually respond to her name and yell, \"I'M FINE\" when asked how she was feeling.  Butrans patch was noted on back during physical examination.   At the time of initial examination, there was no family available.  Much of history has been obtained from chart and ED staff.  She did also have a large bowel movement downstairs.  It was noted to be black and green.  She was also found to have left-side pneumonia, both upper and lower.  In the past, she did have a g-tube due to aspiration.  This was removed at some point.      Discussed with ED nurse Fatou as well. She reports nursing home reported she did also have 7.5 mg of hydrocodone last evening, in addition to her butrans patch.  There was also some concern she may have had extra medications from unknown source in addition to these.     Patient has since been admitted to the CCU due to worsening ABG results on ventimask. Bipap was just initiated shortly after arrival to the CCU. Patient is more alert and oriented now, but cannot recall why she was brought to the ED today or give " any history beyond a few words at at time.  ---------------------------------------------------------------------------------------------------------------------   Review of Systems   Unable to perform ROS: Mental status change      ---------------------------------------------------------------------------------------------------------------------   Past Medical History:   Diagnosis Date   • Allergic rhinitis    • Arthritis    • COPD (chronic obstructive pulmonary disease)    • Dementia    • Depression    • Diverticulitis    • Dysphagia    • GERD (gastroesophageal reflux disease)    • Hypertension      Past Surgical History:   Procedure Laterality Date   • APPENDECTOMY     • CHOLECYSTECTOMY     • DENTAL PROCEDURE     • HEMORRHOIDECTOMY     • HYSTERECTOMY     • TONSILLECTOMY       Family History   Problem Relation Age of Onset   • No Known Problems Mother    • No Known Problems Father    • No Known Problems Sister    • No Known Problems Brother    • No Known Problems Son    • No Known Problems Daughter    • No Known Problems Maternal Grandmother    • No Known Problems Maternal Grandfather    • No Known Problems Paternal Grandmother    • No Known Problems Paternal Grandfather    • No Known Problems Cousin    • Rheum arthritis Neg Hx    • Osteoarthritis Neg Hx    • Asthma Neg Hx    • Diabetes Neg Hx    • Heart failure Neg Hx    • Hyperlipidemia Neg Hx    • Hypertension Neg Hx    • Migraines Neg Hx    • Rashes / Skin problems Neg Hx    • Seizures Neg Hx    • Stroke Neg Hx    • Thyroid disease Neg Hx      Social History     Social History   • Marital status:      Spouse name: N/A   • Number of children: N/A   • Years of education: N/A     Social History Main Topics   • Smoking status: Former Smoker     Types: Cigarettes   • Smokeless tobacco: None   • Alcohol use No   • Drug use: No   • Sexual activity: Defer     Other Topics Concern   • None     Social History Narrative      ---------------------------------------------------------------------------------------------------------------------   Allergies:  Contrast dye; Penicillins; and Sulfa antibiotics  ---------------------------------------------------------------------------------------------------------------------   Prior to Admission Medications     Prescriptions Last Dose Informant Patient Reported? Taking?    amLODIPine (NORVASC) 5 MG tablet   Yes No    Take 5 mg by mouth daily.    aspirin 81 MG EC tablet   Yes No    Take 81 mg by mouth daily.    azelastine (OPTIVAR) 0.05 % ophthalmic solution   Yes No    1 drop 2 (two) times a day.    baclofen (LIORESAL) 10 MG tablet   Yes No    Take 10 mg by mouth daily.    benzonatate (TESSALON) 200 MG capsule   Yes No    Take 200 mg by mouth 3 (three) times a day as needed for cough.    Cholecalciferol (VITAMIN D-3 PO)   Yes No    Take 50,000 Units by mouth 1 (one) time per week.    Cyanocobalamin (VITAMIN B 12 PO)   Yes No    Take 1,000 mcg by mouth daily.    donepezil (ARICEPT) 10 MG tablet   Yes No    Take 10 mg by mouth every night.    escitalopram (LEXAPRO) 10 MG tablet   Yes No    Take 10 mg by mouth daily.    ferrous sulfate 325 (65 FE) MG tablet   Yes No    Take 325 mg by mouth 2 (two) times a day.    fluticasone-salmeterol (ADVAIR DISKUS) 250-50 MCG/DOSE DISKUS   Yes No    Inhale 1 puff 2 (Two) Times a Day.    fluticasone-salmeterol (ADVAIR) 500-50 MCG/DOSE DISKUS   Yes No    Inhale 1 puff 2 (two) times a day.    gabapentin (NEURONTIN) 100 MG capsule   Yes No    Take 300 mg by mouth 2 (Two) Times a Day. Also takes 600 mg once daily    guaiFENesin 200 MG tablet   Yes No    Take 400 mg by mouth 2 (two) times a day.    HYDROcodone-acetaminophen (NORCO) 7.5-325 MG per tablet   Yes No    Take 1 tablet by mouth every 6 (six) hours as needed for moderate pain (4-6).    Lactobacillus (FLORANEX) pack oral packet   Yes No    Take  by mouth 3 (three) times a day.    lactulose (CHRONULAC)  10 GM/15ML solution   Yes No    Take 10 g by mouth daily as needed.    leflunomide (ARAVA) 20 MG tablet   Yes No    Take 20 mg by mouth daily.    Loratadine 10 MG capsule   Yes No    Take 10 mg by mouth every night.    memantine (NAMENDA) 10 MG tablet   Yes No    Take 10 mg by mouth 2 (two) times a day.    naproxen (naproxen) 375 MG tablet delayed-release EC tablet   No No    Take 1 tablet by mouth 2 (two) times a day as needed for mild pain (1-3).    nitroglycerin (NITROSTAT) 0.4 MG SL tablet   Yes No    Place 0.4 mg under the tongue every 5 (five) minutes as needed for chest pain. Take no more than 3 doses in 15 minutes.    pantoprazole (PROTONIX) 40 MG EC tablet   Yes No    Take 40 mg by mouth 2 (two) times a day.    polyethylene glycol (MIRALAX) packet   Yes No    Take 17 g by mouth daily.    Probiotic Product (SUSU-Q PO)   Yes No    Take 1 tablet by mouth daily.        Hospital Scheduled Meds:    [START ON 1/23/2018] aspirin 81 mg Oral Daily   aztreonam 2 g Intravenous Once   [START ON 1/23/2018] aztreonam 2 g Intravenous Q8H   heparin (porcine) 5,000 Units Subcutaneous Q12H   methylPREDNISolone sodium succinate 40 mg Intravenous Q12H   metroNIDAZOLE 500 mg Intravenous Q8H       Pharmacy to dose vancomycin     sodium chloride 100 mL/hr Last Rate: 100 mL/hr (01/22/18 2130)     ---------------------------------------------------------------------------------------------------------------------   Vital Signs:  Temp:  [97.6 °F (36.4 °C)-103.1 °F (39.5 °C)] 98 °F (36.7 °C)  Heart Rate:  [] 82  Resp:  [18-24] 18  BP: (102-164)/(52-90) 103/56  Last 3 weights    01/22/18  0906 01/22/18  1929   Weight: 63.7 kg (140 lb 8 oz) 62.7 kg (138 lb 3 oz)     Body mass index is 22.3 kg/(m^2).  ---------------------------------------------------------------------------------------------------------------------       Physical Exam    Physical Exam:  Constitutional:  Elderly female, chronically ill.  HENT:  Head:  Normocephalic and atraumatic. Bipap mask in place.    Eyes:  Conjunctivae and EOM are normal.  Pupils are equal, round, and reactive to light.  No scleral icterus.  Neck:  Neck supple.  No JVD present.    Cardiovascular:  Normal rate, regular rhythm.  Normal s1/s2 with no murmur.  Pulmonary/Chest:  Diminished breath sounds bilaterally.  Bilateral crackles appreciated in the bases.  Abdominal:  Soft.  Bowel sounds are present.  No distension and no tenderness.   Musculoskeletal:  No edema, no tenderness, and no deformity.  No red or swollen joints anywhere.    Neurological:  Alert to self, place and year. Follows simple commands (this is an improvement compared to time of arrival to ED). No focal deficits appreciated.   Skin:  Skin is warm and dry. No pallor. Non-blanching erythema on heels and sacrum, but no skin breakdown. Has intertrigo in groin area.  Psychiatric: Mild confusion, unable to give history beyond 1-2 words.  Reported dementia.   Peripheral vascular:  No edema and strong pulses on all 4 extremities.  ---------------------------------------------------------------------------------------------------------------------  EKG:  NSR, HR 81. QTc 457. No overt ST changes to suggest acute ischemia.        ---------------------------------------------------------------------------------------------------------------------     Results from last 7 days  Lab Units 01/22/18  0931   LACTATE mmol/L 0.7   WBC 10*3/mm3 6.21   HEMOGLOBIN g/dL 12.5   HEMATOCRIT % 39.5   MCV fL 90.4   MCHC g/dL 31.6*   PLATELETS 10*3/mm3 204       Results from last 7 days  Lab Units 01/22/18  1431   PH, ARTERIAL pH units 7.240*   PO2 ART mm Hg 92.3   PCO2, ARTERIAL mm Hg 46.4*   HCO3 ART mmol/L 19.4*               Results from last 7 days  Lab Units 01/22/18  0931   SODIUM mmol/L 135   POTASSIUM mmol/L 3.6   CHLORIDE mmol/L 105   CO2 mmol/L 23.0*   BUN mg/dL 22*   CREATININE mg/dL 1.17   EGFR IF NONAFRICN AM mL/min/1.73 44*   CALCIUM  mg/dL 8.7   GLUCOSE mg/dL 106   ALBUMIN g/dL 3.60   BILIRUBIN mg/dL 0.3   ALK PHOS U/L 68   AST (SGOT) U/L 23   ALT (SGPT) U/L 20   Estimated Creatinine Clearance: 36.7 mL/min (by C-G formula based on Cr of 1.17).  No results found for: AMMONIA    Results from last 7 days  Lab Units 01/22/18  1939   CK TOTAL U/L 276*  276*   TROPONIN I ng/mL 0.207*   CK MB INDEX % 2.1         Lab Results   Component Value Date    HGBA1C 4.70 01/22/2018     Lab Results   Component Value Date    TSH 0.267 (L) 01/22/2018     No results found for: PREGTESTUR, PREGSERUM, HCG, HCGQUANT  Pain Management Panel     Pain Management Panel Latest Ref Rng & Units 10/25/2016    AMPHETAMINES SCREEN, URINE Negative Negative    BARBITURATES SCREEN Negative Negative    BENZODIAZEPINE SCREEN, URINE Negative Negative    COCAINE SCREEN, URINE Negative Negative    METHADONE SCREEN, URINE Negative Negative                        ---------------------------------------------------------------------------------------------------------------------  Imaging Results (last 7 days)     Procedure Component Value Units Date/Time    XR Chest 1 View [[id number]] Updated:  01/22/18 1002    CT Abdomen Pelvis Without Contrast [[id number]] Collected:  01/22/18 1013     Updated:  01/22/18 1018    Narrative:       CT ABDOMEN PELVIS WO CONTRAST-     CLINICAL INDICATION: Abdominal pain, fever          COMPARISON: 11/15/2016     TECHNIQUE: Axial images were acquired from the lung bases through the  pubic symphysis without any IV or oral contrast.  Reformatted images were created in both the coronal and sagittal planes.     Radiation dose reduction techniques were utilized per ALARA protocol.  Automated exposure control was initiated through either [employer] or  eshtery software packages by  protocol.           FINDINGS:   There is airspace disease in the lingula of the left upper lobe and in  the left lower lobe     The liver is homogeneous. There is no  evidence of focal hepatic mass     The spleen is homogeneous     There is no peripancreatic stranding or pancreatic head mass.     There is no adrenal enlargement.     The kidneys show no evidence of hydronephrosis or hydroureter. I do not  see any distal ureteral stones.      Otherwise I do not see any free fluid or walled off fluid collections.     There are sigmoid diverticuli but no evidence of diverticulitis at this  time     Arthritic change in the spine     There is no evidence of mesenteric or retroperitoneal adenopathy               Impression:       : Left-sided pneumonia  Arthritic changes in the spine  Other findings as above                This report was finalized on 1/22/2018 10:16 AM by Dr. Fabio Zheng MD.       CT Head Without Contrast [[id number]] Collected:  01/22/18 1016     Updated:  01/22/18 1018    Narrative:       CT HEAD WO CONTRAST-     CLINICAL INDICATION: Confusion/delirium, altered LOC, unexplained          COMPARISON: 3/6/2017      TECHNIQUE: Axial images of the brain were obtained with out intravenous  contrast.  Reformatted images were created in the sagittal and coronal  planes.     DOSE:         Radiation dose reduction techniques were utilized per ALARA protocol.  Automated exposure control was initiated through either or [employer] or  D[employer]Right software packages by  protocol.           FINDINGS:   Today's study shows no mass, hemorrhage, or midline shift.   The ventricles, cisterns, and sulci are unremarkable. There is no  hydrocephalus.   There is no evidence of acute ischemia.  I do not see epidural or subdural hematoma.  There is global atrophy  The bone window setting images show no destructive calvarial lesion or  acute calvarial fracture.   The posterior fossa is unremarkable.             Impression:       No acute intracranial pathology. Nothing is seen on this exam to  specifically account for the patient's symptoms.     This report was finalized on 1/22/2018  10:16 AM by Dr. Fabio Zheng MD.             Cultures:         I have personally reviewed the radiology images and read the final radiology report.  ---------------------------------------------------------------------------------------------------------------------  Assessment and Plan:    -Severe sepsis with temperature of 103.1, HR>90, RR of 24, acute hypoxic respiratory failure, acute renal failure and metabolic encephalopathy, felt to be secondary to left upper, lower lobe pneumonia: Broad spectrum IV abx have been ordered with IV azactam, flagyl, and vancomycin to cover for HCAP for which patient meets criteria. Also high suspicion for aspiration. Stool culture obtained in ED. Clostridium difficile was negative in the ED. Urinalysis unremarkable. Blood cultures obtained in ED. Will rule out for atypical organisms.      -Acute hypoxic and hypercapneic respiratory failure likely multifactorial in nature given left-sided pneumonia, COPD exacerbation in addition to possible oversedation with opioids: IV abx as previously outlined.  Orders placed to start BiPAP.  Mycoplasma and legionella ordered. SLP evaluation will be ordered when patient is more alert to assess for possible aspiration.  IV methylprednisone added 40 mg BID given wheezing, along with scheduled duonebs.      -Acute exacerbation of COPD: see plan above.      -Mixed respiratory and metabolic acidosis: started bipap upon arrival to CCU, continue to monitor ABG and adjust bipap settings accordingly.    -Metabolic encephalopathy superimposed on known dementia: CT head is unremarkable. Neuro checks have been added. As stated above in HPI, initial concern for overmedication at nursing home, received narcan in ED and will hold potentially sedating home meds for now. Mentation seems to have improved since admission.      -Indeterminate troponin elevation: no EKG changes to suggest acute ischemia. Suspect related to severe sepsis, hypoxia and renal failure.  Continue to trend cardiac enzymes.    -Acute renal failure: hydrate with IV fluids. Repeat labs in the morning.    -Diarrhea:  Hemoglobin is stable.  Fecal occult blood is negative.  Clostridium difficile testing is negative.  CT abdomen without acute abdominal changes. Continue to monitor.      -History of recurrent aspiration: see plan above. Speech therapy consulted to evaluate swallowing ability.    -Chronic narcotic use for reported arthritis history: again, holding potentially sedating home meds for now.    -DVT prophylaxis with SQ Heparin    Plan of care discussed with patient and her RN Jhonatan in the CCU.    * patient considered high risk due to severe sepsis secondary to left sided pneumonia (HCAP), COPD exacerbation, respiratory failure, mixed acidosis, metabolic encephalopathy, concern for aspiration, advanced age, dementia    Mark Anthony Broussard MD  01/22/18  9:33 PM  ---------------------------------------------------------------------------------------------------------------------     * I have seen the patient in conjunction with Vandana Jansen PA-C, and have amended her note to reflect my own findings, assessment and plan.

## 2018-01-22 NOTE — ED NOTES
Spoke with Viri at [facility] who informs that their records show that the patient's last dose of pain medication was at 2100 last pm, hydrocodone 7.5 mg.  MD notified.     Fatou Melissa RN  01/22/18 [id number]

## 2018-01-22 NOTE — ED NOTES
Pt resting quietly on stretcher with no complaints.  Pt neuro status continues to be improved at this time, pt able to appropriately assist with movement with no resp distress noted, respirations even and unlabored.  Pt denies any needs at this time.  Skin PWD.  Pt family at bedside. Will continue to monitor and follow plan of care.  Bed rails up x2, bed in lowest position, call light in reach.     Fatou Melissa RN  01/22/18 [id number]

## 2018-01-22 NOTE — ED NOTES
Vandana Jansen, hospitalist PA called and informed that the patient will no longer be admitted to telemetry due to her decreasing pH levels on her ABG.  Dr. Castro notified.  Contacted House Supervisor who informs that we have a potential CCU bed and they will work on moving patients around.     Fatou Melissa RN  01/22/18 1073

## 2018-01-22 NOTE — ED NOTES
Pt family remaining at bedside and informs that she has been responding more to them.  Reports that the patient has been opening her eyes more when spoken too.     Fatou Melissa RN  01/22/18 [id number]

## 2018-01-22 NOTE — ED PROVIDER NOTES
Subjective   History of Present Illness  82-year-old white female nursing home resident brought to the emergency room via EMS after altered mental status noted by nursing home staff.  She had some altered mental status yesterday but this was worse this morning.  She was having trouble swallowing her medicines.  It is unclear if this was related to her mental status.  Patient is minimally responsive at this time and unable to give any history.  No other history of present illness is available.  Review of Systems   Unable to perform ROS: Mental status change       Past Medical History:   Diagnosis Date   • Allergic rhinitis    • Arthritis    • COPD (chronic obstructive pulmonary disease)    • Dementia    • Depression    • Diverticulitis    • Dysphagia    • GERD (gastroesophageal reflux disease)    • Hypertension        Allergies   Allergen Reactions   • Contrast Dye    • Penicillins    • Sulfa Antibiotics        Past Surgical History:   Procedure Laterality Date   • APPENDECTOMY     • CHOLECYSTECTOMY     • DENTAL PROCEDURE     • HEMORRHOIDECTOMY     • HYSTERECTOMY     • TONSILLECTOMY         Family History   Problem Relation Age of Onset   • No Known Problems Mother    • No Known Problems Father    • No Known Problems Sister    • No Known Problems Brother    • No Known Problems Son    • No Known Problems Daughter    • No Known Problems Maternal Grandmother    • No Known Problems Maternal Grandfather    • No Known Problems Paternal Grandmother    • No Known Problems Paternal Grandfather    • No Known Problems Cousin    • Rheum arthritis Neg Hx    • Osteoarthritis Neg Hx    • Asthma Neg Hx    • Diabetes Neg Hx    • Heart failure Neg Hx    • Hyperlipidemia Neg Hx    • Hypertension Neg Hx    • Migraines Neg Hx    • Rashes / Skin problems Neg Hx    • Seizures Neg Hx    • Stroke Neg Hx    • Thyroid disease Neg Hx        Social History     Social History   • Marital status:      Spouse name: N/A   • Number of  children: N/A   • Years of education: N/A     Social History Main Topics   • Smoking status: Former Smoker     Types: Cigarettes   • Smokeless tobacco: Not on file   • Alcohol use No   • Drug use: No   • Sexual activity: Defer     Other Topics Concern   • Not on file     Social History Narrative           Objective   Physical Exam   Constitutional: She appears well-developed and well-nourished.   HENT:   Head: Normocephalic and atraumatic.   Mouth/Throat: Oropharynx is clear and moist.   Eyes: EOM are normal. Pupils are equal, round, and reactive to light.   Cardiovascular: Normal rate, regular rhythm and normal heart sounds.  Exam reveals no gallop and no friction rub.    No murmur heard.  Pulmonary/Chest: Effort normal. No respiratory distress. She has no wheezes. She has rales (Left base.). She exhibits no tenderness.   Abdominal: Soft. Bowel sounds are normal. She exhibits no distension. There is no tenderness. There is no rebound and no guarding.   Musculoskeletal: Normal range of motion. She exhibits no edema.   Neurological:   Minimally responsive to verbal and tactile stimuli.  Moves all 4 extremities.   Skin: Skin is warm and dry.   Nursing note and vitals reviewed.    Results for orders placed or performed during the hospital encounter of 01/22/18   Influenza Antigen, Rapid - Swab, Nasopharynx   Result Value Ref Range    Influenza A Ag, EIA Negative Negative    Influenza B Ag, EIA Negative Negative   Clostridium Difficile Toxin, PCR - Stool, Per Rectum   Result Value Ref Range    C. Difficile Toxins by PCR Negative Negative    027 Toxin Presumptive Negative    Comprehensive Metabolic Panel   Result Value Ref Range    Glucose 106 70 - 110 mg/dL    BUN 22 (H) 7 - 21 mg/dL    Creatinine 1.17 0.43 - 1.29 mg/dL    Sodium 135 135 - 153 mmol/L    Potassium 3.6 3.5 - 5.3 mmol/L    Chloride 105 99 - 112 mmol/L    CO2 23.0 (L) 24.3 - 31.9 mmol/L    Calcium 8.7 7.7 - 10.0 mg/dL    Total Protein 6.1 6.0 - 8.0 g/dL     Albumin 3.60 3.40 - 4.80 g/dL    ALT (SGPT) 20 10 - 36 U/L    AST (SGOT) 23 10 - 30 U/L    Alkaline Phosphatase 68 35 - 104 U/L    Total Bilirubin 0.3 0.2 - 1.8 mg/dL    eGFR Non African Amer 44 (L) >60 mL/min/1.73    Globulin 2.5 gm/dL    A/G Ratio 1.4 (L) 1.5 - 2.5 g/dL    BUN/Creatinine Ratio 18.8 7.0 - 25.0    Anion Gap 7.0 3.6 - 11.2 mmol/L   Lactic Acid, Plasma   Result Value Ref Range    Lactate 0.7 0.5 - 2.0 mmol/L   Urinalysis With / Culture If Indicated - Urine, Catheter   Result Value Ref Range    Color, UA Yellow Yellow, Straw    Appearance, UA Clear Clear    pH, UA <=5.0 5.0 - 8.0    Specific Gravity, UA 1.017 1.005 - 1.030    Glucose, UA Negative Negative    Ketones, UA Trace (A) Negative    Bilirubin, UA Negative Negative    Blood, UA Negative Negative    Protein, UA Negative Negative    Leuk Esterase, UA Negative Negative    Nitrite, UA Negative Negative    Urobilinogen, UA 0.2 E.U./dL 0.2 - 1.0 E.U./dL   CBC Auto Differential   Result Value Ref Range    WBC 6.21 4.50 - 12.50 10*3/mm3    RBC 4.37 4.20 - 5.40 10*6/mm3    Hemoglobin 12.5 12.0 - 16.0 g/dL    Hematocrit 39.5 37.0 - 47.0 %    MCV 90.4 80.0 - 94.0 fL    MCH 28.6 27.0 - 33.0 pg    MCHC 31.6 (L) 33.0 - 37.0 g/dL    RDW 14.1 11.5 - 14.5 %    RDW-SD 46.0 37.0 - 54.0 fl    MPV 9.2 6.0 - 10.0 fL    Platelets 204 130 - 400 10*3/mm3    Neutrophil % 80.6 (H) 40.0 - 75.0 %    Lymphocyte % 8.4 (L) 16.0 - 46.0 %    Monocyte % 10.3 0.0 - 12.0 %    Eosinophil % 0.3 0.0 - 7.0 %    Basophil % 0.2 0.0 - 2.0 %    Immature Grans % 0.2 0.0 - 0.5 %    Neutrophils, Absolute 5.01 1.40 - 6.50 10*3/mm3    Lymphocytes, Absolute 0.52 (L) 1.00 - 3.00 10*3/mm3    Monocytes, Absolute 0.64 0.10 - 0.90 10*3/mm3    Eosinophils, Absolute 0.02 0.00 - 0.70 10*3/mm3    Basophils, Absolute 0.01 0.00 - 0.30 10*3/mm3    Immature Grans, Absolute 0.01 0.00 - 0.03 10*3/mm3   Blood Gas, Arterial   Result Value Ref Range    Site Arterial: left femoral     Ramirez's Test N/A     pH,  Arterial 7.295 (C) 7.350 - 7.450 pH units    pCO2, Arterial 47.5 (H) 35.0 - 45.0 mm Hg    pO2, Arterial 39.6 (C) 80.0 - 100.0 mm Hg    HCO3, Arterial 22.6 22.0 - 26.0 mmol/L    Base Excess, Arterial -4.1 mmol/L    O2 Saturation, Arterial 72.9 (C) 90.0 - 100.0 %    Hemoglobin, Blood Gas 13.0 12 - 16 g/dL    Hematocrit, Blood Gas 38.0 37.0 - 47.0 %    Oxyhemoglobin 71.0 (L) 85 - 100 %    Methemoglobin 0.60 0.00 - 3.00 %    Carboxyhemoglobin 2.0 0 - 5 %    A-a Gradiant 45.7 0.0 - 300.0 mmHg    Temperature 98.6 C    Barometric Pressure for Blood Gas 724 mmHg    Modality Room Air     FIO2 21 %   Blood Gas, Arterial   Result Value Ref Range    Site Arterial: left radial     Ramirez's Test N/A     pH, Arterial 7.309 (L) 7.350 - 7.450 pH units    pCO2, Arterial 46.2 (H) 35.0 - 45.0 mm Hg    pO2, Arterial 59.9 (L) 80.0 - 100.0 mm Hg    HCO3, Arterial 22.7 22.0 - 26.0 mmol/L    Base Excess, Arterial -3.7 mmol/L    O2 Saturation, Arterial 90.2 90.0 - 100.0 %    Hemoglobin, Blood Gas 13.1 12 - 16 g/dL    Hematocrit, Blood Gas 39.0 37.0 - 47.0 %    Oxyhemoglobin 87.4 85 - 100 %    Methemoglobin 0.80 0.00 - 3.00 %    Carboxyhemoglobin 2.3 0 - 5 %    A-a Gradiant 123.3 0.0 - 300.0 mmHg    Temperature 98.6 C    Barometric Pressure for Blood Gas 724 mmHg    Modality Venti Mask     FIO2 35 %   Fecal Lactoferrin - Stool, Per Rectum   Result Value Ref Range    Lactoferrin, Qual Positive (A) Negative   Osmolality, Calculated   Result Value Ref Range    Osmolality Calc 273.8 273.0 - 305.0 mOsm/kg   Light Blue Top   Result Value Ref Range    Extra Tube hold for add-on    Green Top (Gel)   Result Value Ref Range    Extra Tube Hold for add-ons.    Lavender Top   Result Value Ref Range    Extra Tube hold for add-on    Gold Top - SST   Result Value Ref Range    Extra Tube Hold for add-ons.      Ct Abdomen Pelvis Without Contrast    Result Date: 1/22/2018  Narrative: CT ABDOMEN PELVIS WO CONTRAST-  CLINICAL INDICATION: Abdominal pain, fever      COMPARISON: 11/15/2016  TECHNIQUE: Axial images were acquired from the lung bases through the pubic symphysis without any IV or oral contrast. Reformatted images were created in both the coronal and sagittal planes.  Radiation dose reduction techniques were utilized per ALARA protocol. Automated exposure control was initiated through either or CareDose or DoseRight software packages by  protocol.     FINDINGS: There is airspace disease in the lingula of the left upper lobe and in the left lower lobe  The liver is homogeneous. There is no evidence of focal hepatic mass  The spleen is homogeneous  There is no peripancreatic stranding or pancreatic head mass.  There is no adrenal enlargement.  The kidneys show no evidence of hydronephrosis or hydroureter. I do not see any distal ureteral stones.  Otherwise I do not see any free fluid or walled off fluid collections.  There are sigmoid diverticuli but no evidence of diverticulitis at this time  Arthritic change in the spine  There is no evidence of mesenteric or retroperitoneal adenopathy          Impression: : Left-sided pneumonia Arthritic changes in the spine Other findings as above       This report was finalized on 1/22/2018 10:16 AM by Dr. Fabio Zheng MD.      Ct Head Without Contrast    Result Date: 1/22/2018  Narrative: CT HEAD WO CONTRAST-  CLINICAL INDICATION: Confusion/delirium, altered LOC, unexplained     COMPARISON: 3/6/2017  TECHNIQUE: Axial images of the brain were obtained with out intravenous contrast.  Reformatted images were created in the sagittal and coronal planes.  DOSE:     Radiation dose reduction techniques were utilized per ALARA protocol. Automated exposure control was initiated through either or CareDose or DoseRight software packages by  protocol.     FINDINGS: Today's study shows no mass, hemorrhage, or midline shift. The ventricles, cisterns, and sulci are unremarkable. There is no hydrocephalus. There is no  evidence of acute ischemia. I do not see epidural or subdural hematoma. There is global atrophy The bone window setting images show no destructive calvarial lesion or acute calvarial fracture. The posterior fossa is unremarkable.          Impression: No acute intracranial pathology. Nothing is seen on this exam to specifically account for the patient's symptoms.  This report was finalized on 1/22/2018 10:16 AM by Dr. Fabio Zheng MD.          Central Line At Bedside  Date/Time: 1/22/2018 10:13 AM  Performed by: SANDER CASTRO  Authorized by: SANDER CASTRO     Consent:     Consent obtained:  Emergent situation  Pre-procedure details:     Hand hygiene: Hand hygiene performed prior to insertion      Sterile barrier technique: All elements of maximal sterile technique followed      Skin preparation:  2% chlorhexidine    Skin preparation agent: Skin preparation agent completely dried prior to procedure    Anesthesia (see MAR for exact dosages):     Anesthesia method:  Local infiltration    Local anesthetic:  Lidocaine 1% w/o epi  Procedure details:     Location:  R subclavian    Patient position:  Flat    Procedural supplies:  Triple lumen    Catheter size:  7 Fr    Landmarks identified: yes      Ultrasound guidance: no      Number of attempts:  1    Successful placement: yes    Post-procedure details:     Post-procedure:  Dressing applied and line sutured    Assessment:  Blood return through all ports, no pneumothorax on x-ray, free fluid flow and placement verified by x-ray    Patient tolerance of procedure:  Tolerated well, no immediate complications             ED Course  ED Course   Value Comment By Time   CT Abdomen Pelvis Without Contrast (Reviewed) Sander Castro MD 01/22 9077    Discussed with Dr. Rois.  The patient hemodynamically improved.  Mental status also improved but not normal.  She is now arousable and will talk to family.  After Blanca will send his PA to evaluate patient for placement planning before  he will agree to admit the patient. Sander Castro MD 01/22 1340    Hospitalist agrees to admit patient to telemetry. Sander Castro MD 01/22 141                  MDM  Number of Diagnoses or Management Options  Acute respiratory failure with hypoxia and hypercapnia:   Altered mental status, unspecified altered mental status type:   Pneumonia of left lower lobe due to infectious organism:   Sepsis, due to unspecified organism:      Amount and/or Complexity of Data Reviewed  Clinical lab tests: reviewed  Tests in the radiology section of CPT®: reviewed  Decide to obtain previous medical records or to obtain history from someone other than the patient: yes    Risk of Complications, Morbidity, and/or Mortality  Presenting problems: high  Diagnostic procedures: high  Management options: high    Critical Care  Total time providing critical care: 30-74 minutes (30)      Final diagnoses:   Pneumonia of left lower lobe due to infectious organism   Sepsis, due to unspecified organism   Altered mental status, unspecified altered mental status type   Acute respiratory failure with hypoxia and hypercapnia            Sander Castro MD  01/22/18 141       Sander Castro MD  01/22/18 8753

## 2018-01-22 NOTE — ED NOTES
Pt resting quietly on stretcher with no complaints.  Pt neuro status continues to be improved at this time, pt able to appropriately assist with movement with no resp distress noted, respirations even and unlabored.  Pt denies any needs at this time.  Skin PWD.  Pt family at bedside. Will continue to monitor and follow plan of care.  Bed rails up x2, bed in lowest position, call light in reach.       Fatou Melissa RN  01/22/18 8006

## 2018-01-22 NOTE — ED NOTES
Pt brought in from [facility] for altered mental status/unresponsive with a low O2 sat.  Pt brought in with NC 2L O2 on and the responses appropriately to name being spoken.  Pt has low O2 sats at this time.  Pt PWD.  Dr. Castro called to bedside at this time.        Fatou Melissa RN  01/22/18 6185

## 2018-01-22 NOTE — ED NOTES
Pt resting quietly on stretcher with no complaints.  Pt neuro status slightly improved at this time, pt able to appropriately assist with movement with no resp distress noted, respirations even and unlabored.  Pt denies any needs at this time.  Skin PWD.  Pt family at bedside. Will continue to monitor and follow plan of care.  Bed rails up x2, bed in lowest position, call light in reach.       Fatou Melissa RN  01/22/18 0585

## 2018-01-22 NOTE — ED NOTES
"Upon hospitalist PA assessment, pt responds by yelling \"I'm fine\" when asked how she is, pt still does not open eyes at this time.     Fatou Melissa RN  01/22/18 4821    "

## 2018-01-22 NOTE — ED NOTES
Pt Venti-mask increased to 50% at 6lpm at this time by request from Dr. Castro.     Fatou Melissa RN  01/22/18 0170       Fatou Melissa RN  01/22/18 7545

## 2018-01-22 NOTE — PROGRESS NOTES
Discharge Planning Assessment   [place]     Patient Name: Amy Do  MRN: [id number]  Today's Date: 1/22/2018    Admit Date: 1/22/2018       Discharge Plan       01/22/18 1620    Case Management/Social Work Plan    Plan Pt is a resident of [facility] and has a 14 day bed hold per Emmanuelle.  SS will continue to follow.      Patient/Family In Agreement With Plan yes          Marta Lima

## 2018-01-22 NOTE — ED NOTES
Pt resting quietly on stretcher with no complaints.  Pt neuro status continues to be improved at this time, pt able to appropriately assist with movement with no resp distress noted, respirations even and unlabored.  Pt denies any needs at this time.  Skin PWD.  Pt family at bedside. Will continue to monitor and follow plan of care.  Bed rails up x2, bed in lowest position, call light in reach.       Fatou Melissa RN  01/22/18 8249

## 2018-01-22 NOTE — ED NOTES
Dr. Castro at bedside, unable to obtain IV access at this time after multiple attempts, MD informs to prep for central line placement.  Supplies gathered.     Fatou Melissa RN  01/22/18 0911

## 2018-01-22 NOTE — ED NOTES
Blood noted under sterile dressing at central line insertion site, Dr. Castro notified and informed to change dressing.  Sterile dressing change completed at this time, with no difficulty, pt tolerated well.  MD notified.     Fatou Melissa RN  01/22/18 9268

## 2018-01-23 ENCOUNTER — APPOINTMENT (OUTPATIENT)
Dept: CARDIOLOGY | Facility: HOSPITAL | Age: 83
End: 2018-01-23
Attending: INTERNAL MEDICINE

## 2018-01-23 ENCOUNTER — APPOINTMENT (OUTPATIENT)
Dept: SPEECH THERAPY | Facility: HOSPITAL | Age: 83
End: 2018-01-23
Attending: HOSPITALIST

## 2018-01-23 LAB
6-ACETYL MORPHINE: NEGATIVE
A-A DO2: 83.6 MMHG (ref 0–300)
AMPHET+METHAMPHET UR QL: NEGATIVE
ANION GAP SERPL CALCULATED.3IONS-SCNC: 6.2 MMOL/L (ref 3.6–11.2)
ARTERIAL PATENCY WRIST A: POSITIVE
ATMOSPHERIC PRESS: 724 MMHG
ATMOSPHERIC PRESS: 724 MMHG
BARBITURATES UR QL SCN: NEGATIVE
BASE EXCESS BLDA CALC-SCNC: -7 MMOL/L
BASE EXCESS BLDV CALC-SCNC: -5.7 MMOL/L
BASOPHILS # BLD AUTO: 0.01 10*3/MM3 (ref 0–0.3)
BASOPHILS NFR BLD AUTO: 0.2 % (ref 0–2)
BDY SITE: ABNORMAL
BDY SITE: NORMAL
BENZODIAZ UR QL SCN: NEGATIVE
BH CV ECHO MEAS - % IVS THICK: 19.9 %
BH CV ECHO MEAS - % LVPW THICK: 50.9 %
BH CV ECHO MEAS - ACS: 1.8 CM
BH CV ECHO MEAS - AO MAX PG: 14.4 MMHG
BH CV ECHO MEAS - AO MEAN PG: 7.2 MMHG
BH CV ECHO MEAS - AO ROOT AREA (BSA CORRECTED): 1.8
BH CV ECHO MEAS - AO ROOT AREA: 7.3 CM^2
BH CV ECHO MEAS - AO ROOT DIAM: 3 CM
BH CV ECHO MEAS - AO V2 MAX: 190 CM/SEC
BH CV ECHO MEAS - AO V2 MEAN: 122.2 CM/SEC
BH CV ECHO MEAS - AO V2 VTI: 36.6 CM
BH CV ECHO MEAS - BSA(HAYCOCK): 1.7 M^2
BH CV ECHO MEAS - BSA: 1.7 M^2
BH CV ECHO MEAS - BZI_BMI: 22.3 KILOGRAMS/M^2
BH CV ECHO MEAS - BZI_METRIC_HEIGHT: 167.6 CM
BH CV ECHO MEAS - BZI_METRIC_WEIGHT: 62.6 KG
BH CV ECHO MEAS - CONTRAST EF 4CH: 69.8 ML/M^2
BH CV ECHO MEAS - EDV(CUBED): 98 ML
BH CV ECHO MEAS - EDV(MOD-SP4): 43 ML
BH CV ECHO MEAS - EDV(TEICH): 97.8 ML
BH CV ECHO MEAS - EF(CUBED): 73 %
BH CV ECHO MEAS - EF(MOD-SP4): 69.8 %
BH CV ECHO MEAS - EF(TEICH): 64.8 %
BH CV ECHO MEAS - ESV(CUBED): 26.5 ML
BH CV ECHO MEAS - ESV(MOD-SP4): 13 ML
BH CV ECHO MEAS - ESV(TEICH): 34.5 ML
BH CV ECHO MEAS - FS: 35.3 %
BH CV ECHO MEAS - IVS/LVPW: 1
BH CV ECHO MEAS - IVSD: 1 CM
BH CV ECHO MEAS - IVSS: 1.2 CM
BH CV ECHO MEAS - LA DIMENSION: 2.6 CM
BH CV ECHO MEAS - LA/AO: 0.85
BH CV ECHO MEAS - LV DIASTOLIC VOL/BSA (35-75): 25.2 ML/M^2
BH CV ECHO MEAS - LV MASS(C)D: 157.3 GRAMS
BH CV ECHO MEAS - LV MASS(C)DI: 92.1 GRAMS/M^2
BH CV ECHO MEAS - LV MASS(C)S: 129.2 GRAMS
BH CV ECHO MEAS - LV MASS(C)SI: 75.6 GRAMS/M^2
BH CV ECHO MEAS - LV SYSTOLIC VOL/BSA (12-30): 7.6 ML/M^2
BH CV ECHO MEAS - LVIDD: 4.6 CM
BH CV ECHO MEAS - LVIDS: 3 CM
BH CV ECHO MEAS - LVLD AP4: 5.8 CM
BH CV ECHO MEAS - LVLS AP4: 5.2 CM
BH CV ECHO MEAS - LVOT AREA (M): 2.3 CM^2
BH CV ECHO MEAS - LVOT AREA: 2.4 CM^2
BH CV ECHO MEAS - LVOT DIAM: 1.7 CM
BH CV ECHO MEAS - LVPWD: 0.97 CM
BH CV ECHO MEAS - LVPWS: 1.5 CM
BH CV ECHO MEAS - MV A MAX VEL: 173.5 CM/SEC
BH CV ECHO MEAS - MV E MAX VEL: 111.1 CM/SEC
BH CV ECHO MEAS - MV E/A: 0.64
BH CV ECHO MEAS - PA ACC SLOPE: 1643 CM/SEC^2
BH CV ECHO MEAS - PA ACC TIME: 0.1 SEC
BH CV ECHO MEAS - PA PR(ACCEL): 36.2 MMHG
BH CV ECHO MEAS - RAP SYSTOLE: 10 MMHG
BH CV ECHO MEAS - RVSP: 49 MMHG
BH CV ECHO MEAS - SI(AO): 155.8 ML/M^2
BH CV ECHO MEAS - SI(CUBED): 41.9 ML/M^2
BH CV ECHO MEAS - SI(MOD-SP4): 17.6 ML/M^2
BH CV ECHO MEAS - SI(TEICH): 37.1 ML/M^2
BH CV ECHO MEAS - SV(AO): 266.1 ML
BH CV ECHO MEAS - SV(CUBED): 71.5 ML
BH CV ECHO MEAS - SV(MOD-SP4): 30 ML
BH CV ECHO MEAS - SV(TEICH): 63.4 ML
BH CV ECHO MEAS - TR MAX VEL: 312.4 CM/SEC
BNP SERPL-MCNC: 80 PG/ML (ref 0–100)
BODY TEMPERATURE: 98.5 C
BODY TEMPERATURE: 98.6 C
BUN BLD-MCNC: 22 MG/DL (ref 7–21)
BUN/CREAT SERPL: 27.8 (ref 7–25)
BUPRENORPHINE SERPL-MCNC: NEGATIVE NG/ML
CALCIUM SPEC-SCNC: 8.4 MG/DL (ref 7.7–10)
CANNABINOIDS SERPL QL: NEGATIVE
CHLORIDE SERPL-SCNC: 111 MMOL/L (ref 99–112)
CK MB SERPL-CCNC: 6.13 NG/ML (ref 0–5)
CK MB SERPL-CCNC: 6.63 NG/ML (ref 0–5)
CK MB SERPL-RTO: 2.5 % (ref 0–3)
CK MB SERPL-RTO: 2.7 % (ref 0–3)
CK SERPL-CCNC: 229 U/L (ref 24–173)
CK SERPL-CCNC: 229 U/L (ref 24–173)
CK SERPL-CCNC: 264 U/L (ref 24–173)
CO2 SERPL-SCNC: 23.8 MMOL/L (ref 24.3–31.9)
COCAINE UR QL: NEGATIVE
COHGB MFR BLD: 1.7 % (ref 0–5)
CREAT BLD-MCNC: 0.79 MG/DL (ref 0.43–1.29)
DEPRECATED RDW RBC AUTO: 46.6 FL (ref 37–54)
EOSINOPHIL # BLD AUTO: 0 10*3/MM3 (ref 0–0.7)
EOSINOPHIL NFR BLD AUTO: 0 % (ref 0–7)
EPAP: 5
ERYTHROCYTE [DISTWIDTH] IN BLOOD BY AUTOMATED COUNT: 14.3 % (ref 11.5–14.5)
GAS FLOW AIRWAY: 50 LPM
GFR SERPL CREATININE-BSD FRML MDRD: 70 ML/MIN/1.73
GLUCOSE BLD-MCNC: 99 MG/DL (ref 70–110)
GLUCOSE BLDC GLUCOMTR-MCNC: 100 MG/DL (ref 70–130)
GLUCOSE BLDC GLUCOMTR-MCNC: 109 MG/DL (ref 70–130)
GLUCOSE BLDC GLUCOMTR-MCNC: 119 MG/DL (ref 70–130)
GLUCOSE BLDC GLUCOMTR-MCNC: 145 MG/DL (ref 70–130)
HCO3 BLDA-SCNC: 19.1 MMOL/L (ref 22–26)
HCO3 BLDV-SCNC: 21.6 MMOL/L
HCT VFR BLD AUTO: 37.7 % (ref 37–47)
HCT VFR BLD CALC: 34 % (ref 37–47)
HGB BLD-MCNC: 11.7 G/DL (ref 12–16)
HGB BLDA-MCNC: 11.6 G/DL (ref 12–16)
HGB BLDA-MCNC: 12.1 G/DL (ref 12–16)
HOROWITZ INDEX BLD+IHG-RTO: 30 %
HOROWITZ INDEX BLD+IHG-RTO: 60 %
IMM GRANULOCYTES # BLD: 0.02 10*3/MM3 (ref 0–0.03)
IMM GRANULOCYTES NFR BLD: 0.3 % (ref 0–0.5)
IPAP: 16
LYMPHOCYTES # BLD AUTO: 0.44 10*3/MM3 (ref 1–3)
LYMPHOCYTES NFR BLD AUTO: 7.7 % (ref 16–46)
MAGNESIUM SERPL-MCNC: 1.4 MG/DL (ref 1.7–2.6)
MAGNESIUM SERPL-MCNC: 2.7 MG/DL (ref 1.7–2.6)
MAXIMAL PREDICTED HEART RATE: 138 BPM
MCH RBC QN AUTO: 28.4 PG (ref 27–33)
MCHC RBC AUTO-ENTMCNC: 31 G/DL (ref 33–37)
MCV RBC AUTO: 91.5 FL (ref 80–94)
METHADONE UR QL SCN: NEGATIVE
METHGB BLD QL: 0.3 % (ref 0–3)
MODALITY: ABNORMAL
MODALITY: NORMAL
MONOCYTES # BLD AUTO: 0.2 10*3/MM3 (ref 0.1–0.9)
MONOCYTES NFR BLD AUTO: 3.5 % (ref 0–12)
NEUTROPHILS # BLD AUTO: 5.08 10*3/MM3 (ref 1.4–6.5)
NEUTROPHILS NFR BLD AUTO: 88.3 % (ref 40–75)
O+P SPEC MICRO: NORMAL
OPIATES UR QL: NEGATIVE
OSMOLALITY SERPL CALC.SUM OF ELEC: 284.6 MOSM/KG (ref 273–305)
OVA + PARASITE RESULT 1: NORMAL
OXYCODONE UR QL SCN: NEGATIVE
OXYHGB MFR BLDV: 91.2 % (ref 85–100)
PCO2 BLDA: 40.6 MM HG (ref 35–45)
PCO2 BLDV: 50.3 MM HG
PCP UR QL SCN: NEGATIVE
PH BLDA: 7.29 PH UNITS (ref 7.35–7.45)
PH BLDV: 7.25 PH UNITS
PHOSPHATE SERPL-MCNC: 3.1 MG/DL (ref 2.7–4.5)
PLATELET # BLD AUTO: 207 10*3/MM3 (ref 130–400)
PMV BLD AUTO: 9.3 FL (ref 6–10)
PO2 BLDA: 71.8 MM HG (ref 80–100)
PO2 BLDV: 44.1 MM HG
POTASSIUM BLD-SCNC: 3.8 MMOL/L (ref 3.5–5.3)
RBC # BLD AUTO: 4.12 10*6/MM3 (ref 4.2–5.4)
SAO2 % BLDCOA: 93.1 % (ref 90–100)
SAO2 % BLDCOV: 70.6 %
SET MECH RESP RATE: 18
SODIUM BLD-SCNC: 141 MMOL/L (ref 135–153)
STRESS TARGET HR: 117 BPM
TROPONIN I SERPL-MCNC: 0.1 NG/ML
TROPONIN I SERPL-MCNC: 0.15 NG/ML
WBC NRBC COR # BLD: 5.75 10*3/MM3 (ref 4.5–12.5)

## 2018-01-23 PROCEDURE — 99233 SBSQ HOSP IP/OBS HIGH 50: CPT | Performed by: INTERNAL MEDICINE

## 2018-01-23 PROCEDURE — 94799 UNLISTED PULMONARY SVC/PX: CPT

## 2018-01-23 PROCEDURE — 80307 DRUG TEST PRSMV CHEM ANLYZR: CPT | Performed by: PHYSICIAN ASSISTANT

## 2018-01-23 PROCEDURE — 84484 ASSAY OF TROPONIN QUANT: CPT | Performed by: INTERNAL MEDICINE

## 2018-01-23 PROCEDURE — 83880 ASSAY OF NATRIURETIC PEPTIDE: CPT | Performed by: HOSPITALIST

## 2018-01-23 PROCEDURE — G8978 MOBILITY CURRENT STATUS: HCPCS

## 2018-01-23 PROCEDURE — 99222 1ST HOSP IP/OBS MODERATE 55: CPT | Performed by: INTERNAL MEDICINE

## 2018-01-23 PROCEDURE — G8998 SWALLOW D/C STATUS: HCPCS

## 2018-01-23 PROCEDURE — 93306 TTE W/DOPPLER COMPLETE: CPT | Performed by: INTERNAL MEDICINE

## 2018-01-23 PROCEDURE — 83050 HGB METHEMOGLOBIN QUAN: CPT | Performed by: HOSPITALIST

## 2018-01-23 PROCEDURE — 85025 COMPLETE CBC W/AUTO DIFF WBC: CPT | Performed by: INTERNAL MEDICINE

## 2018-01-23 PROCEDURE — 80048 BASIC METABOLIC PNL TOTAL CA: CPT | Performed by: INTERNAL MEDICINE

## 2018-01-23 PROCEDURE — 84100 ASSAY OF PHOSPHORUS: CPT | Performed by: INTERNAL MEDICINE

## 2018-01-23 PROCEDURE — 93306 TTE W/DOPPLER COMPLETE: CPT

## 2018-01-23 PROCEDURE — 97530 THERAPEUTIC ACTIVITIES: CPT

## 2018-01-23 PROCEDURE — G8997 SWALLOW GOAL STATUS: HCPCS

## 2018-01-23 PROCEDURE — 82375 ASSAY CARBOXYHB QUANT: CPT | Performed by: HOSPITALIST

## 2018-01-23 PROCEDURE — 82553 CREATINE MB FRACTION: CPT | Performed by: INTERNAL MEDICINE

## 2018-01-23 PROCEDURE — 36600 WITHDRAWAL OF ARTERIAL BLOOD: CPT | Performed by: HOSPITALIST

## 2018-01-23 PROCEDURE — 82962 GLUCOSE BLOOD TEST: CPT

## 2018-01-23 PROCEDURE — 25010000002 HEPARIN (PORCINE) PER 1000 UNITS: Performed by: INTERNAL MEDICINE

## 2018-01-23 PROCEDURE — 97116 GAIT TRAINING THERAPY: CPT

## 2018-01-23 PROCEDURE — 82805 BLOOD GASES W/O2 SATURATION: CPT | Performed by: HOSPITALIST

## 2018-01-23 PROCEDURE — 83735 ASSAY OF MAGNESIUM: CPT | Performed by: HOSPITALIST

## 2018-01-23 PROCEDURE — 94660 CPAP INITIATION&MGMT: CPT

## 2018-01-23 PROCEDURE — 25010000002 MAGNESIUM SULFATE IN D5W 1G/100ML (PREMIX) 1-5 GM/100ML-% SOLUTION: Performed by: HOSPITALIST

## 2018-01-23 PROCEDURE — 83735 ASSAY OF MAGNESIUM: CPT | Performed by: INTERNAL MEDICINE

## 2018-01-23 PROCEDURE — 97162 PT EVAL MOD COMPLEX 30 MIN: CPT

## 2018-01-23 PROCEDURE — G8979 MOBILITY GOAL STATUS: HCPCS

## 2018-01-23 PROCEDURE — G8996 SWALLOW CURRENT STATUS: HCPCS

## 2018-01-23 PROCEDURE — 82805 BLOOD GASES W/O2 SATURATION: CPT | Performed by: NURSE PRACTITIONER

## 2018-01-23 PROCEDURE — 99291 CRITICAL CARE FIRST HOUR: CPT | Performed by: INTERNAL MEDICINE

## 2018-01-23 PROCEDURE — 25010000002 METHYLPREDNISOLONE PER 40 MG: Performed by: INTERNAL MEDICINE

## 2018-01-23 PROCEDURE — 82550 ASSAY OF CK (CPK): CPT | Performed by: INTERNAL MEDICINE

## 2018-01-23 PROCEDURE — 92612 ENDOSCOPY SWALLOW (FEES) VID: CPT

## 2018-01-23 PROCEDURE — 25010000002 VANCOMYCIN PER 500 MG: Performed by: INTERNAL MEDICINE

## 2018-01-23 RX ORDER — MAGNESIUM SULFATE HEPTAHYDRATE 40 MG/ML
4 INJECTION, SOLUTION INTRAVENOUS AS NEEDED
Status: DISCONTINUED | OUTPATIENT
Start: 2018-01-23 | End: 2018-01-27 | Stop reason: HOSPADM

## 2018-01-23 RX ORDER — CARBOXYMETHYLCELLULOSE SODIUM 5 MG/ML
1 SOLUTION/ DROPS OPHTHALMIC EVERY 4 HOURS PRN
Status: DISCONTINUED | OUTPATIENT
Start: 2018-01-23 | End: 2018-01-27 | Stop reason: HOSPADM

## 2018-01-23 RX ORDER — GUAIFENESIN 200 MG/1
400 TABLET ORAL 2 TIMES DAILY
Status: DISCONTINUED | OUTPATIENT
Start: 2018-01-23 | End: 2018-01-27 | Stop reason: HOSPADM

## 2018-01-23 RX ORDER — ATORVASTATIN CALCIUM 20 MG/1
20 TABLET, FILM COATED ORAL NIGHTLY
Status: DISCONTINUED | OUTPATIENT
Start: 2018-01-23 | End: 2018-01-27 | Stop reason: HOSPADM

## 2018-01-23 RX ORDER — OMEGA-3S/DHA/EPA/FISH OIL/D3 300MG-1000
1000 CAPSULE ORAL DAILY
Status: DISCONTINUED | OUTPATIENT
Start: 2018-01-23 | End: 2018-01-27 | Stop reason: HOSPADM

## 2018-01-23 RX ORDER — AMLODIPINE BESYLATE 5 MG/1
5 TABLET ORAL DAILY
Status: DISCONTINUED | OUTPATIENT
Start: 2018-01-23 | End: 2018-01-27 | Stop reason: HOSPADM

## 2018-01-23 RX ORDER — MAGNESIUM SULFATE 1 G/100ML
1 INJECTION INTRAVENOUS
Status: COMPLETED | OUTPATIENT
Start: 2018-01-23 | End: 2018-01-23

## 2018-01-23 RX ORDER — KETOTIFEN FUMARATE 0.35 MG/ML
1 SOLUTION/ DROPS OPHTHALMIC 2 TIMES DAILY
Status: DISCONTINUED | OUTPATIENT
Start: 2018-01-23 | End: 2018-01-27 | Stop reason: HOSPADM

## 2018-01-23 RX ORDER — ONDANSETRON 4 MG/1
4 TABLET, FILM COATED ORAL EVERY 6 HOURS PRN
Status: DISCONTINUED | OUTPATIENT
Start: 2018-01-23 | End: 2018-01-27 | Stop reason: HOSPADM

## 2018-01-23 RX ORDER — FERROUS SULFATE 325(65) MG
325 TABLET ORAL 2 TIMES DAILY WITH MEALS
Status: DISCONTINUED | OUTPATIENT
Start: 2018-01-23 | End: 2018-01-27 | Stop reason: HOSPADM

## 2018-01-23 RX ORDER — POLYETHYLENE GLYCOL 3350 17 G/17G
17 POWDER, FOR SOLUTION ORAL DAILY PRN
Status: DISCONTINUED | OUTPATIENT
Start: 2018-01-23 | End: 2018-01-27 | Stop reason: HOSPADM

## 2018-01-23 RX ORDER — CETIRIZINE HYDROCHLORIDE 10 MG/1
5 TABLET ORAL DAILY
Status: DISCONTINUED | OUTPATIENT
Start: 2018-01-23 | End: 2018-01-27 | Stop reason: HOSPADM

## 2018-01-23 RX ORDER — BUSPIRONE HYDROCHLORIDE 5 MG/1
5 TABLET ORAL EVERY 12 HOURS SCHEDULED
Status: DISCONTINUED | OUTPATIENT
Start: 2018-01-23 | End: 2018-01-27 | Stop reason: HOSPADM

## 2018-01-23 RX ORDER — LANOLIN ALCOHOL/MO/W.PET/CERES
1000 CREAM (GRAM) TOPICAL DAILY
Status: DISCONTINUED | OUTPATIENT
Start: 2018-01-23 | End: 2018-01-27 | Stop reason: HOSPADM

## 2018-01-23 RX ORDER — LACTULOSE 10 G/15ML
10 SOLUTION ORAL DAILY PRN
Status: DISCONTINUED | OUTPATIENT
Start: 2018-01-23 | End: 2018-01-27 | Stop reason: HOSPADM

## 2018-01-23 RX ORDER — DEXTROSE MONOHYDRATE 25 G/50ML
25 INJECTION, SOLUTION INTRAVENOUS
Status: DISCONTINUED | OUTPATIENT
Start: 2018-01-23 | End: 2018-01-27 | Stop reason: HOSPADM

## 2018-01-23 RX ORDER — NICOTINE POLACRILEX 4 MG
15 LOZENGE BUCCAL
Status: DISCONTINUED | OUTPATIENT
Start: 2018-01-23 | End: 2018-01-27 | Stop reason: HOSPADM

## 2018-01-23 RX ORDER — MEMANTINE HYDROCHLORIDE 10 MG/1
10 TABLET ORAL 2 TIMES DAILY
Status: DISCONTINUED | OUTPATIENT
Start: 2018-01-23 | End: 2018-01-27 | Stop reason: HOSPADM

## 2018-01-23 RX ORDER — BUDESONIDE AND FORMOTEROL FUMARATE DIHYDRATE 160; 4.5 UG/1; UG/1
2 AEROSOL RESPIRATORY (INHALATION)
Status: DISCONTINUED | OUTPATIENT
Start: 2018-01-23 | End: 2018-01-27 | Stop reason: HOSPADM

## 2018-01-23 RX ORDER — HYDROCODONE BITARTRATE AND ACETAMINOPHEN 5; 325 MG/1; MG/1
1 TABLET ORAL EVERY 6 HOURS PRN
Status: DISCONTINUED | OUTPATIENT
Start: 2018-01-23 | End: 2018-01-27 | Stop reason: HOSPADM

## 2018-01-23 RX ORDER — DONEPEZIL HYDROCHLORIDE 5 MG/1
10 TABLET, FILM COATED ORAL NIGHTLY
Status: DISCONTINUED | OUTPATIENT
Start: 2018-01-23 | End: 2018-01-27 | Stop reason: HOSPADM

## 2018-01-23 RX ORDER — MAGNESIUM SULFATE HEPTAHYDRATE 40 MG/ML
2 INJECTION, SOLUTION INTRAVENOUS AS NEEDED
Status: DISCONTINUED | OUTPATIENT
Start: 2018-01-23 | End: 2018-01-27 | Stop reason: HOSPADM

## 2018-01-23 RX ORDER — NITROGLYCERIN 0.4 MG/1
0.4 TABLET SUBLINGUAL
Status: DISCONTINUED | OUTPATIENT
Start: 2018-01-23 | End: 2018-01-27 | Stop reason: HOSPADM

## 2018-01-23 RX ORDER — MAGNESIUM SULFATE 1 G/100ML
1 INJECTION INTRAVENOUS AS NEEDED
Status: DISCONTINUED | OUTPATIENT
Start: 2018-01-23 | End: 2018-01-27 | Stop reason: HOSPADM

## 2018-01-23 RX ORDER — BISACODYL 10 MG
10 SUPPOSITORY, RECTAL RECTAL DAILY PRN
Status: DISCONTINUED | OUTPATIENT
Start: 2018-01-23 | End: 2018-01-27 | Stop reason: HOSPADM

## 2018-01-23 RX ORDER — L.ACID,PARA/B.BIFIDUM/S.THERM 8B CELL
1 CAPSULE ORAL DAILY
Status: DISCONTINUED | OUTPATIENT
Start: 2018-01-23 | End: 2018-01-27 | Stop reason: HOSPADM

## 2018-01-23 RX ORDER — DOCUSATE SODIUM 100 MG/1
200 CAPSULE, LIQUID FILLED ORAL 2 TIMES DAILY PRN
Status: DISCONTINUED | OUTPATIENT
Start: 2018-01-23 | End: 2018-01-27 | Stop reason: HOSPADM

## 2018-01-23 RX ORDER — SODIUM BICARBONATE 650 MG/1
650 TABLET ORAL 4 TIMES DAILY
Status: DISPENSED | OUTPATIENT
Start: 2018-01-23 | End: 2018-01-24

## 2018-01-23 RX ORDER — L.ACID,PARA/B.BIFIDUM/S.THERM 8B CELL
1 CAPSULE ORAL DAILY
Status: DISCONTINUED | OUTPATIENT
Start: 2018-01-23 | End: 2018-01-23 | Stop reason: SDUPTHER

## 2018-01-23 RX ORDER — PANTOPRAZOLE SODIUM 40 MG/1
40 TABLET, DELAYED RELEASE ORAL 2 TIMES DAILY
Status: DISCONTINUED | OUTPATIENT
Start: 2018-01-23 | End: 2018-01-27 | Stop reason: HOSPADM

## 2018-01-23 RX ORDER — ESCITALOPRAM OXALATE 10 MG/1
5 TABLET ORAL DAILY
Status: DISCONTINUED | OUTPATIENT
Start: 2018-01-23 | End: 2018-01-27 | Stop reason: HOSPADM

## 2018-01-23 RX ADMIN — Medication 1000 MCG: at 11:54

## 2018-01-23 RX ADMIN — Medication: at 17:19

## 2018-01-23 RX ADMIN — SODIUM BICARBONATE TAB 650 MG 650 MG: 650 TAB at 11:54

## 2018-01-23 RX ADMIN — ESCITALOPRAM 5 MG: 10 TABLET, FILM COATED ORAL at 11:54

## 2018-01-23 RX ADMIN — IPRATROPIUM BROMIDE AND ALBUTEROL SULFATE 3 ML: .5; 3 SOLUTION RESPIRATORY (INHALATION) at 19:31

## 2018-01-23 RX ADMIN — METHYLPREDNISOLONE SODIUM SUCCINATE 40 MG: 40 INJECTION, POWDER, FOR SOLUTION INTRAMUSCULAR; INTRAVENOUS at 21:21

## 2018-01-23 RX ADMIN — BUSPIRONE HYDROCHLORIDE 5 MG: 5 TABLET ORAL at 21:20

## 2018-01-23 RX ADMIN — CETIRIZINE HYDROCHLORIDE 5 MG: 10 TABLET ORAL at 11:53

## 2018-01-23 RX ADMIN — METRONIDAZOLE 500 MG: 500 INJECTION, SOLUTION INTRAVENOUS at 05:00

## 2018-01-23 RX ADMIN — MAGNESIUM SULFATE IN DEXTROSE 1 G: 10 INJECTION, SOLUTION INTRAVENOUS at 05:57

## 2018-01-23 RX ADMIN — PANTOPRAZOLE SODIUM 40 MG: 40 TABLET, DELAYED RELEASE ORAL at 11:54

## 2018-01-23 RX ADMIN — SODIUM CHLORIDE, POTASSIUM CHLORIDE, SODIUM LACTATE AND CALCIUM CHLORIDE 1000 ML: 600; 310; 30; 20 INJECTION, SOLUTION INTRAVENOUS at 08:49

## 2018-01-23 RX ADMIN — CHOLECALCIFEROL TAB 10 MCG (400 UNIT) 1000 UNITS: 10 TAB at 11:53

## 2018-01-23 RX ADMIN — METRONIDAZOLE 500 MG: 500 INJECTION, SOLUTION INTRAVENOUS at 21:20

## 2018-01-23 RX ADMIN — METHYLPREDNISOLONE SODIUM SUCCINATE 40 MG: 40 INJECTION, POWDER, FOR SOLUTION INTRAMUSCULAR; INTRAVENOUS at 08:49

## 2018-01-23 RX ADMIN — KETOTIFEN FUMARATE 1 DROP: 0.35 SOLUTION/ DROPS OPHTHALMIC at 21:20

## 2018-01-23 RX ADMIN — IPRATROPIUM BROMIDE AND ALBUTEROL SULFATE 3 ML: .5; 3 SOLUTION RESPIRATORY (INHALATION) at 07:00

## 2018-01-23 RX ADMIN — HEPARIN SODIUM 5000 UNITS: 5000 INJECTION, SOLUTION INTRAVENOUS; SUBCUTANEOUS at 21:20

## 2018-01-23 RX ADMIN — Medication: at 21:21

## 2018-01-23 RX ADMIN — HEPARIN SODIUM 5000 UNITS: 5000 INJECTION, SOLUTION INTRAVENOUS; SUBCUTANEOUS at 08:49

## 2018-01-23 RX ADMIN — SODIUM BICARBONATE TAB 650 MG 650 MG: 650 TAB at 17:18

## 2018-01-23 RX ADMIN — Medication 1 CAPSULE: at 10:39

## 2018-01-23 RX ADMIN — DONEPEZIL HYDROCHLORIDE 10 MG: 5 TABLET, FILM COATED ORAL at 21:19

## 2018-01-23 RX ADMIN — MEMANTINE HYDROCHLORIDE 10 MG: 10 TABLET, FILM COATED ORAL at 11:54

## 2018-01-23 RX ADMIN — IPRATROPIUM BROMIDE AND ALBUTEROL SULFATE 3 ML: .5; 3 SOLUTION RESPIRATORY (INHALATION) at 12:41

## 2018-01-23 RX ADMIN — BUDESONIDE AND FORMOTEROL FUMARATE DIHYDRATE 2 PUFF: 160; 4.5 AEROSOL RESPIRATORY (INHALATION) at 19:31

## 2018-01-23 RX ADMIN — AZTREONAM 2 G: 2 INJECTION, POWDER, FOR SOLUTION INTRAMUSCULAR; INTRAVENOUS at 08:51

## 2018-01-23 RX ADMIN — NYSTATIN: 100000 POWDER TOPICAL at 08:55

## 2018-01-23 RX ADMIN — FERROUS SULFATE TAB 325 MG (65 MG ELEMENTAL FE) 325 MG: 325 (65 FE) TAB at 11:53

## 2018-01-23 RX ADMIN — MEMANTINE HYDROCHLORIDE 10 MG: 10 TABLET, FILM COATED ORAL at 21:19

## 2018-01-23 RX ADMIN — VANCOMYCIN HYDROCHLORIDE 1000 MG: 5 INJECTION, POWDER, LYOPHILIZED, FOR SOLUTION INTRAVENOUS at 10:38

## 2018-01-23 RX ADMIN — Medication: at 13:50

## 2018-01-23 RX ADMIN — GUAIFENESIN 400 MG: 200 TABLET ORAL at 21:19

## 2018-01-23 RX ADMIN — FERROUS SULFATE TAB 325 MG (65 MG ELEMENTAL FE) 325 MG: 325 (65 FE) TAB at 17:18

## 2018-01-23 RX ADMIN — BUSPIRONE HYDROCHLORIDE 5 MG: 5 TABLET ORAL at 11:54

## 2018-01-23 RX ADMIN — KETOTIFEN FUMARATE 1 DROP: 0.35 SOLUTION/ DROPS OPHTHALMIC at 11:53

## 2018-01-23 RX ADMIN — PANTOPRAZOLE SODIUM 40 MG: 40 TABLET, DELAYED RELEASE ORAL at 21:19

## 2018-01-23 RX ADMIN — GUAIFENESIN 400 MG: 200 TABLET ORAL at 11:54

## 2018-01-23 RX ADMIN — METRONIDAZOLE 500 MG: 500 INJECTION, SOLUTION INTRAVENOUS at 13:49

## 2018-01-23 RX ADMIN — MAGNESIUM SULFATE IN DEXTROSE 1 G: 10 INJECTION, SOLUTION INTRAVENOUS at 05:01

## 2018-01-23 RX ADMIN — ATORVASTATIN CALCIUM 20 MG: 20 TABLET, FILM COATED ORAL at 21:20

## 2018-01-23 RX ADMIN — MAGNESIUM SULFATE IN DEXTROSE 1 G: 10 INJECTION, SOLUTION INTRAVENOUS at 03:52

## 2018-01-23 RX ADMIN — SODIUM BICARBONATE TAB 650 MG 650 MG: 650 TAB at 22:00

## 2018-01-23 RX ADMIN — ONDANSETRON 4 MG: 4 TABLET, FILM COATED ORAL at 17:18

## 2018-01-23 RX ADMIN — AMLODIPINE BESYLATE 5 MG: 5 TABLET ORAL at 11:53

## 2018-01-23 RX ADMIN — AZTREONAM 2 G: 2 INJECTION, POWDER, FOR SOLUTION INTRAMUSCULAR; INTRAVENOUS at 16:15

## 2018-01-23 NOTE — NURSING NOTE
Dermatitis noted to mendy area  Treatment ordered  Also for prevention of pressure injuries treatment has been ordered

## 2018-01-23 NOTE — PLAN OF CARE
Problem: Inpatient Physical Therapy  Goal: Bed Mobility Goal LTG- PT  Outcome: Ongoing (interventions implemented as appropriate)   01/23/18 1827   Bed Mobility PT LTG   Bed Mobility PT LTG, Date Established 01/23/18   Bed Mobility PT LTG, Time to Achieve by discharge   Bed Mobility PT LTG, Activity Type all bed mobility   Bed Mobility PT LTG, [place] Level contact guard assist   Bed Mobility PT Goal LTG, Assist Device bed rails     Goal: Transfer Training Goal 1 LTG- PT  Outcome: Ongoing (interventions implemented as appropriate)   01/23/18 1827   Transfer Training PT LTG   Transfer Training PT LTG, Date Established 01/23/18   Transfer Training PT LTG, Time to Achieve by discharge   Transfer Training PT LTG, Activity Type all transfers   Transfer Training PT LTG, [place] Level contact guard assist   Transfer Training PT LTG, Assist Device walker, rolling     Goal: Gait Training Goal LTG- PT  Outcome: Ongoing (interventions implemented as appropriate)

## 2018-01-23 NOTE — PROGRESS NOTES
THC Physician - Brief Progress Note  PERMANENT  2018 23:12    [facility]  [facility] - CCU - 10 - [place], [place] ([facility])    TRAM WATERS    Date of Service 2018 23:12    HPI/Events of Note AICU Provider Assessment Note    83 yo F admitted to the ICU from the ER with the followin) Acute mixed resp failure on BIPAP   2) Severe sepsis due to aspiration pneumonia/HCAP  3) COPD exacerbation   4) Metabolic encephalopathy, multifactorial including opiates. CT head -ve for acute findings.   5) JOSÉ MIGUEL  6) Comorbidities including: dementia, HTN and GERD     - IVF and abx  - Nebs and steroids   - Neuro checks  - Follow KFT   - Follow WOB, O2 rq and NS     _x____   Video Assessment performed  __x___   Most recent labs reviewed  __x___   Vital Signs reviewed  __x___   Best Practices addressed:                 VTE prophylaxis: SC heparin                  SUP (when indicated):                 Glycemic control:                      Please notify bedside physician when present or [facility] if glc > 180 X 2                 Sepsis guidelines:                 Lung protective strategy:    _____     Spoke with bedside RN  _____     Orders written      Contact [facility] for any needs if bedside physician is not present.      Interventions Major-Acute renal failure - evaluation and management, Change in mental status - evaluation and management, Hypercarbia - evaluation and management, Hypoxemia - evaluation and management, Infection - evaluation and management, Respiratory   failure - evaluation and management, Sepsis - evaluation and management        Electronically Signed by: Fab Locke) on 2018 11:23 PM

## 2018-01-23 NOTE — CONSULTS
Referring Provider: Dr. Broussard  Reason for Consultation: respiratory failure       Chief complaint shortness of breath       History of present illness:      Mrs. Do is a 82 year old female who was admitted on 1/22/18. She was brought to the ER via EMS with AMS from the nursing home. She was found to have pneumonia, sepsis, and acute respiratory failure with hypoxia and hypercapnia. Pulmonary critical care is consulted for her respiratory failure.     Her medical history consist of allergic rhinitis, arthritis, COPD, Dysphagia, GERD, Diverticulitis, Depression, and HTN. She is a full code.     Review Of Systems:   Review of Systems   Constitutional: Negative for chills, fatigue and fever.   HENT: Negative for congestion and rhinorrhea.    Eyes: Negative for visual disturbance.   Respiratory: Positive for shortness of breath. Negative for cough and wheezing.    Cardiovascular: Negative for chest pain and leg swelling.   Gastrointestinal: Negative for abdominal distention and abdominal pain.   Endocrine: Negative for cold intolerance and heat intolerance.   Genitourinary: Negative for difficulty urinating.   Musculoskeletal: Negative for arthralgias and myalgias.   Skin: Negative for color change.   Allergic/Immunologic: Negative for environmental allergies.   Neurological: Negative for dizziness, weakness and light-headedness.   Hematological: Negative for adenopathy.   Psychiatric/Behavioral: Negative for agitation and behavioral problems.          History  Past Medical History:   Diagnosis Date   • Allergic rhinitis    • Arthritis    • COPD (chronic obstructive pulmonary disease)    • Dementia    • Depression    • Diverticulitis    • Dysphagia    • GERD (gastroesophageal reflux disease)    • Hypertension    , Past Surgical History:   Procedure Laterality Date   • APPENDECTOMY     • CHOLECYSTECTOMY     • DENTAL PROCEDURE     • HEMORRHOIDECTOMY     • HYSTERECTOMY     • TONSILLECTOMY     , Family History    Problem Relation Age of Onset   • No Known Problems Mother    • No Known Problems Father    • No Known Problems Sister    • No Known Problems Brother    • No Known Problems Son    • No Known Problems Daughter    • No Known Problems Maternal Grandmother    • No Known Problems Maternal Grandfather    • No Known Problems Paternal Grandmother    • No Known Problems Paternal Grandfather    • No Known Problems Cousin    • Rheum arthritis Neg Hx    • Osteoarthritis Neg Hx    • Asthma Neg Hx    • Diabetes Neg Hx    • Heart failure Neg Hx    • Hyperlipidemia Neg Hx    • Hypertension Neg Hx    • Migraines Neg Hx    • Rashes / Skin problems Neg Hx    • Seizures Neg Hx    • Stroke Neg Hx    • Thyroid disease Neg Hx    , Social History   Substance Use Topics   • Smoking status: Former Smoker     Types: Cigarettes   • Smokeless tobacco: None   • Alcohol use No   , Prescriptions Prior to Admission   Medication Sig Dispense Refill Last Dose   • acidophilus (FLORANEX) tablet tablet Take 1 tablet by mouth Daily.   1/22/2018 at am   • amLODIPine (NORVASC) 5 MG tablet Take 5 mg by mouth daily.   1/22/2018 at am   • aspirin 81 MG EC tablet Take 81 mg by mouth daily.   1/22/2018 at am   • azelastine (OPTIVAR) 0.05 % ophthalmic solution Administer 1 drop to both eyes 2 (Two) Times a Day.   1/22/2018 at am   • baclofen (LIORESAL) 10 MG tablet Take 10 mg by mouth Every Night.   1/21/2018 at pm   • Buprenorphine (BUTRANS) 15 MCG/HR patch weekly Place 1 patch on the skin Every 7 (Seven) Days.   1/16/2018 at am   • busPIRone (BUSPAR) 5 MG tablet Take 5 mg by mouth 2 (Two) Times a Day.   1/22/2018 at am   • celecoxib (CeleBREX) 200 MG capsule Take 200 mg by mouth Daily.   1/22/2018 at am   • cholecalciferol (VITAMIN D3) 1000 units tablet Take 1,000 Units by mouth Daily.   1/22/2018 at am   • Cyanocobalamin (VITAMIN B 12 PO) Take 1,000 mcg by mouth daily.   1/22/2018 at am   • donepezil (ARICEPT) 10 MG tablet Take 10 mg by mouth every night.    1/21/2018 at pm   • escitalopram (LEXAPRO) 5 MG tablet Take 5 mg by mouth Daily.   1/22/2018 at am   • ferrous sulfate 325 (65 FE) MG tablet Take 325 mg by mouth 2 (two) times a day.   1/22/2018 at am   • fluticasone-salmeterol (ADVAIR DISKUS) 250-50 MCG/DOSE DISKUS Inhale 1 puff 2 (Two) Times a Day.   1/22/2018 at am   • gabapentin (NEURONTIN) 600 MG tablet Take 600 mg by mouth Every 8 (Eight) Hours.   1/22/2018 at 0800   • guaiFENesin 200 MG tablet Take 400 mg by mouth 2 (two) times a day.   1/22/2018 at am   • HYDROcodone-acetaminophen (NORCO) 7.5-325 MG per tablet Take 1 tablet by mouth Every 12 (Twelve) Hours As Needed for Moderate Pain .   1/21/2018 at Unknown time   • leflunomide (ARAVA) 20 MG tablet Take 20 mg by mouth daily.   1/22/2018 at am   • linaclotide (LINZESS) 72 MCG capsule capsule Take 72 mcg by mouth 3 (Three) Times a Week.   1/22/2018 at am   • loperamide (IMODIUM) 2 MG capsule Take 2 mg by mouth Every 6 (Six) Hours As Needed for Diarrhea.   1/19/2018 at 1649   • loratadine (CLARITIN) 10 MG tablet Take 10 mg by mouth Every Night.   1/21/2018 at pm   • memantine (NAMENDA) 10 MG tablet Take 10 mg by mouth 2 (two) times a day.   1/22/2018 at am   • ondansetron (ZOFRAN) 4 MG tablet Take 4 mg by mouth Every 6 (Six) Hours As Needed for Nausea or Vomiting.   1/21/2018 at Unknown time   • oseltamivir (TAMIFLU) 75 MG capsule Take 75 mg by mouth Daily.   1/22/2018 at am   • pantoprazole (PROTONIX) 40 MG EC tablet Take 40 mg by mouth 2 (two) times a day.   1/22/2018 at am   • acetaminophen (TYLENOL) 500 MG tablet Take 500 mg by mouth Every 4 (Four) Hours As Needed for Mild Pain  or Fever.   Unknown at Unknown time   • ARTIFICIAL TEAR SOLUTION OP Apply 1 drop to eye Every 4 (Four) Hours As Needed (dryness).   Unknown at Unknown time   • bisacodyl (DULCOLAX) 10 MG suppository Insert 10 mg into the rectum Daily As Needed for Constipation.   Unknown at Unknown time   • docusate sodium (COLACE) 250 MG capsule  Take 250 mg by mouth Every 12 (Twelve) Hours As Needed for Constipation.   Unknown at Unknown time   • lactulose (CHRONULAC) 10 GM/15ML solution Take 10 g by mouth Daily As Needed (constipation).   Unknown at Unknown time   • nitroglycerin (NITROSTAT) 0.4 MG SL tablet Place 0.4 mg under the tongue every 5 (five) minutes as needed for chest pain. Take no more than 3 doses in 15 minutes.   Unknown at Unknown time   • phenylephrine-mineral oil-petrolatum (HEMORRHOIDAL) 0.25-14-74.9 % ointment hemorrhoidal ointment Insert 1 application into the rectum Every 6 (Six) Hours As Needed (hemorrhoids).   Unknown at Unknown time   • polyethylene glycol (MIRALAX) packet Take 17 g by mouth Daily As Needed (constipation).   Unknown at Unknown time   , Scheduled Meds:    amLODIPine 5 mg Oral Daily   aspirin 81 mg Oral Daily   aztreonam 2 g Intravenous Q8H   budesonide-formoterol 2 puff Inhalation BID - RT   busPIRone 5 mg Oral Q12H   cetirizine 5 mg Oral Daily   cholecalciferol 1,000 Units Oral Daily   donepezil 10 mg Oral Nightly   escitalopram 5 mg Oral Daily   ferrous sulfate 325 mg Oral BID With Meals   guaiFENesin 400 mg Oral BID   heparin (porcine) 5,000 Units Subcutaneous Q12H   insulin aspart 0-7 Units Subcutaneous 4x Daily AC & at Bedtime   ipratropium-albuterol 3 mL Nebulization Q6H - RT   ketotifen 1 drop Both Eyes BID   memantine 10 mg Oral BID   methylPREDNISolone sodium succinate 40 mg Intravenous Q12H   metroNIDAZOLE 500 mg Intravenous Q8H   nystatin  Topical Q12H   pantoprazole 40 mg Oral BID   Risaquad-2 1 capsule Oral Daily   sodium bicarbonate 650 mg Oral 4x Daily   vancomycin 1,000 mg Intravenous Q18H   vitamin B-12 1,000 mcg Oral Daily   , Continuous Infusions:    Pharmacy to dose vancomycin     and Allergies:  Contrast dye; Penicillins; and Sulfa antibiotics    Objective     Vital Signs   Vitals:    01/23/18 1100   BP: (!) 143/123   Pulse: 101   Resp: 18   Temp:    SpO2: 100%       Physical Exam:                GENERAL APPEARANCE:  alert and cooperative, and appears to be in no acute distress.    HEAD: normocephalic.    EYES: PERRL, EOMI. Fundi normal, vision is grossly intact.    THROAT: Oral cavity and pharynx normal. No inflammation, swelling, exudate, or lesions.     NECK: Neck supple.     CARDIAC: Normal S1 and S2. No S3, S4 or murmurs. Rhythm is regular. There is no peripheral edema, cyanosis or pallor. Extremities are warm and well perfused. Capillary refill is less than 2 seconds. No carotid bruits.    Respiratory: Clear to auscultation without rales, rhonchi, wheezing or diminished breath sounds.    GI: Positive bowel sounds. Soft, nondistended, nontender.     Musculoskeletal: No significant deformity or joint abnormality. No edema. Peripheral pulses intact. No varicosities.    NEUROLOGICAL: Strength and sensation symmetric and intact throughout.     PSYCHIATRIC: The mental examination revealed the patient was oriented to person, place, and time.                Results Review:    LABS:    Lab Results   Component Value Date    GLUCOSE 99 01/23/2018    BUN 22 (H) 01/23/2018    CREATININE 0.79 01/23/2018    EGFRIFNONA 70 01/23/2018    BCR 27.8 (H) 01/23/2018    CO2 23.8 (L) 01/23/2018    CALCIUM 8.4 01/23/2018    ALBUMIN 3.60 01/22/2018    LABIL2 1.4 (L) 01/22/2018    AST 23 01/22/2018    ALT 20 01/22/2018    WBC 5.75 01/23/2018    HGB 11.7 (L) 01/23/2018    HCT 37.7 01/23/2018    MCV 91.5 01/23/2018     01/23/2018     01/23/2018    K 3.8 01/23/2018     01/23/2018    ANIONGAP 6.2 01/23/2018       Lab Results   Component Value Date    INR 0.93 03/06/2017    INR <0.90 10/25/2016    PROTIME 10.3 03/06/2017    PROTIME 10.1 10/25/2016                      I reviewed the patient's new clinical results.  I reviewed the patient's new imaging results and agree with the interpretation.      Assessment/Plan      Neuro: she is awake and alert no concerns currently.     Respiratory Failure-hypercarbic and  hypoxic: likely related to underlying lung disease, COPD, and pneumonia likely related to aspiration. Continue antibiotics. SLP evaluation today. She does have a history of aspiration and reportedly had a G-Tube at some time in the past for this. Continue BiPap for shortness of breath and HS, will use HFNC during the day, titrate Fi02 to maintain Sp02 >92-94%    Incentive spirometer 10 times an hour. Continue scheduled inhalants and nebulizer's. Mycoplasma is negative, legionella is pending. Universal aspiration precautions.     Cardiac: hemodynamically stable, continue continuous ECG and v/s monitoring, maintain MAP >65.     Renal: Creatinine .79 ,  continue strict I&O, electrolytes replaced accordingly. HCO low, LR 1 liter given. PO bicarb given, will monitor closely.      GI: SLP evaluation today to determine diet.     DVT prophylaxis: heparin SQ BID.     IV access: central line in right subclavian 1/22/18.     ID: WBC is 5.75, neutrophils are 88.3, she does have fever, continue vanco, azactam and flagyl. Continue to monitor lab trends and clinical changes. Influenza negative, blood cultures are negative currently.     Principal Problem:    Pneumonia of left lower lobe due to infectious organism    I discussed the patients findings and my recommendations with patient, family and nursing staff    ROLLY Rocha  01/23/18  11:26 AM    Scribed for Dr. Timmons by ROLLY Florez.     I, Kenroy Timmons M.D. attest that the above note accurately reflects the work and decisions made  by me.  Patient was seen and evaluated by Dr. Timmons, including history of present illness, physical exam, assessment, and treatment plan.  The above note was reviewed and edited by Dr. Timmons.Critical Care time spent in direct patient care: 33 minutes (excluding procedure time, if applicable) including high complexity decision making to assess, manipulate, and support vital organ system failure in this individual who has  impairment of one or more vital organ systems such that there is a high probability of imminent or life threatening deterioration in the patient’s condition.

## 2018-01-23 NOTE — PROGRESS NOTES
Kinetics :  Vancomycin day 2    The patient has been evaluated for vancomycin therapy for pneumonia.  Will follow the ER dose of 1250mg with vancomycin 1gm q q 18 hrs and monitor with you.

## 2018-01-23 NOTE — CONSULTS
Date of Admit: 1/22/2018  Date of Consult: 01/23/18  No ref. provider found      Principal Problem:    Pneumonia of left lower lobe due to infectious organism    Assessment:    1. Indeterminate range troponin which is probably due to sepsis from pneumonia.  2. No history of known coronary artery disease.  3. Acute hypoxic/hypercapnic respiratory failure secondary to exacerbation of COPD from pneumonia.  4. Severe sepsis secondary to pneumonia.    Recommendations:    1. Agree with treating with low-dose aspirin for now.  2. Add low-dose statin as well and check fasting lipid panel.  3. Evaluate her LV wall motion and systolic function with an echo Doppler study and tailor further therapy accordingly.  4. We will consider further cardiac evaluation with a regadenosn sestamibi study later on when her respiratory status improves adequately.    Reason for consultation: Elevated troponin.    Liz Do is a 82 y.o. female with problems as listed above presents with    History of Present Illness: Ms. Do is a pleasant 82-year-old  female with no history of known coronary artery disease, was admitted on 1/20/2018 after being transferred from a local nursing home with altered mental status.  She was found to be in acute hypoxic/hypercapnic respiratory failure along with left-sided pneumonia and exacerbation of COPD.she is also noted to have mild elevation of troponin up to 0.207 and trending down.  On further questioning Ms. Do gives a history of having had recent left-sided chest pains which she describes as tightness.there are moderate intensity.  There is some ascitic shortness of breath but no sweating.  She currently denies any chest pain or discomfort.  Her EKG revealed normal sinus rhythm with no significant ischemic changes of myocardial ischemia or infarction.  She denies any previous history of known coronary artery disease or any other heart problems.    Cardiac risk  factors:hypertension and Age and postmenopausal status.        Past Medical History:   Diagnosis Date   • Allergic rhinitis    • Arthritis    • COPD (chronic obstructive pulmonary disease)    • Dementia    • Depression    • Diverticulitis    • Dysphagia    • GERD (gastroesophageal reflux disease)    • Hypertension      Past Surgical History:   Procedure Laterality Date   • APPENDECTOMY     • CHOLECYSTECTOMY     • DENTAL PROCEDURE     • HEMORRHOIDECTOMY     • HYSTERECTOMY     • TONSILLECTOMY       Family History   Problem Relation Age of Onset   • No Known Problems Mother    • No Known Problems Father    • No Known Problems Sister    • No Known Problems Brother    • No Known Problems Son    • No Known Problems Daughter    • No Known Problems Maternal Grandmother    • No Known Problems Maternal Grandfather    • No Known Problems Paternal Grandmother    • No Known Problems Paternal Grandfather    • No Known Problems Cousin    • Rheum arthritis Neg Hx    • Osteoarthritis Neg Hx    • Asthma Neg Hx    • Diabetes Neg Hx    • Heart failure Neg Hx    • Hyperlipidemia Neg Hx    • Hypertension Neg Hx    • Migraines Neg Hx    • Rashes / Skin problems Neg Hx    • Seizures Neg Hx    • Stroke Neg Hx    • Thyroid disease Neg Hx      Social History   Substance Use Topics   • Smoking status: Former Smoker     Types: Cigarettes   • Smokeless tobacco: None   • Alcohol use No     Prescriptions Prior to Admission   Medication Sig Dispense Refill Last Dose   • acidophilus (FLORANEX) tablet tablet Take 1 tablet by mouth Daily.   1/22/2018 at am   • amLODIPine (NORVASC) 5 MG tablet Take 5 mg by mouth daily.   1/22/2018 at am   • aspirin 81 MG EC tablet Take 81 mg by mouth daily.   1/22/2018 at am   • azelastine (OPTIVAR) 0.05 % ophthalmic solution Administer 1 drop to both eyes 2 (Two) Times a Day.   1/22/2018 at am   • baclofen (LIORESAL) 10 MG tablet Take 10 mg by mouth Every Night.   1/21/2018 at pm   • Buprenorphine (BUTRANS) 15 MCG/HR  patch weekly Place 1 patch on the skin Every 7 (Seven) Days.   1/16/2018 at am   • busPIRone (BUSPAR) 5 MG tablet Take 5 mg by mouth 2 (Two) Times a Day.   1/22/2018 at am   • celecoxib (CeleBREX) 200 MG capsule Take 200 mg by mouth Daily.   1/22/2018 at am   • cholecalciferol (VITAMIN D3) 1000 units tablet Take 1,000 Units by mouth Daily.   1/22/2018 at am   • Cyanocobalamin (VITAMIN B 12 PO) Take 1,000 mcg by mouth daily.   1/22/2018 at am   • donepezil (ARICEPT) 10 MG tablet Take 10 mg by mouth every night.   1/21/2018 at pm   • escitalopram (LEXAPRO) 5 MG tablet Take 5 mg by mouth Daily.   1/22/2018 at am   • ferrous sulfate 325 (65 FE) MG tablet Take 325 mg by mouth 2 (two) times a day.   1/22/2018 at am   • fluticasone-salmeterol (ADVAIR DISKUS) 250-50 MCG/DOSE DISKUS Inhale 1 puff 2 (Two) Times a Day.   1/22/2018 at am   • gabapentin (NEURONTIN) 600 MG tablet Take 600 mg by mouth Every 8 (Eight) Hours.   1/22/2018 at 0800   • guaiFENesin 200 MG tablet Take 400 mg by mouth 2 (two) times a day.   1/22/2018 at am   • HYDROcodone-acetaminophen (NORCO) 7.5-325 MG per tablet Take 1 tablet by mouth Every 12 (Twelve) Hours As Needed for Moderate Pain .   1/21/2018 at Unknown time   • leflunomide (ARAVA) 20 MG tablet Take 20 mg by mouth daily.   1/22/2018 at am   • linaclotide (LINZESS) 72 MCG capsule capsule Take 72 mcg by mouth 3 (Three) Times a Week.   1/22/2018 at am   • loperamide (IMODIUM) 2 MG capsule Take 2 mg by mouth Every 6 (Six) Hours As Needed for Diarrhea.   1/19/2018 at 1649   • loratadine (CLARITIN) 10 MG tablet Take 10 mg by mouth Every Night.   1/21/2018 at pm   • memantine (NAMENDA) 10 MG tablet Take 10 mg by mouth 2 (two) times a day.   1/22/2018 at am   • ondansetron (ZOFRAN) 4 MG tablet Take 4 mg by mouth Every 6 (Six) Hours As Needed for Nausea or Vomiting.   1/21/2018 at Unknown time   • oseltamivir (TAMIFLU) 75 MG capsule Take 75 mg by mouth Daily.   1/22/2018 at am   • pantoprazole (PROTONIX)  40 MG EC tablet Take 40 mg by mouth 2 (two) times a day.   1/22/2018 at am   • acetaminophen (TYLENOL) 500 MG tablet Take 500 mg by mouth Every 4 (Four) Hours As Needed for Mild Pain  or Fever.   Unknown at Unknown time   • ARTIFICIAL TEAR SOLUTION OP Apply 1 drop to eye Every 4 (Four) Hours As Needed (dryness).   Unknown at Unknown time   • bisacodyl (DULCOLAX) 10 MG suppository Insert 10 mg into the rectum Daily As Needed for Constipation.   Unknown at Unknown time   • docusate sodium (COLACE) 250 MG capsule Take 250 mg by mouth Every 12 (Twelve) Hours As Needed for Constipation.   Unknown at Unknown time   • lactulose (CHRONULAC) 10 GM/15ML solution Take 10 g by mouth Daily As Needed (constipation).   Unknown at Unknown time   • nitroglycerin (NITROSTAT) 0.4 MG SL tablet Place 0.4 mg under the tongue every 5 (five) minutes as needed for chest pain. Take no more than 3 doses in 15 minutes.   Unknown at Unknown time   • phenylephrine-mineral oil-petrolatum (HEMORRHOIDAL) 0.25-14-74.9 % ointment hemorrhoidal ointment Insert 1 application into the rectum Every 6 (Six) Hours As Needed (hemorrhoids).   Unknown at Unknown time   • polyethylene glycol (MIRALAX) packet Take 17 g by mouth Daily As Needed (constipation).   Unknown at Unknown time     Allergies:  Contrast dye; Penicillins; and Sulfa antibiotics    Review of Systems   Constitutional: Negative for appetite change, chills and fever.   HENT: Negative for congestion, ear discharge, ear pain and sore throat.    Eyes: Negative for pain and redness.   Respiratory: Positive for cough and shortness of breath. Negative for wheezing.    Cardiovascular: Positive for chest pain. Negative for palpitations and leg swelling.   Gastrointestinal: Negative for abdominal pain, diarrhea, nausea and vomiting.   Endocrine: Negative for cold intolerance, heat intolerance, polydipsia and polyuria.   Genitourinary: Negative for dysuria and hematuria.   Skin: Negative for rash.    Neurological: Negative for seizures, syncope and headaches.   Psychiatric/Behavioral: Negative for confusion. The patient is not nervous/anxious.          Objective      Vital Signs  Temp:  [97.6 °F (36.4 °C)-98.8 °F (37.1 °C)] 98.7 °F (37.1 °C)  Heart Rate:  [] 92  Resp:  [18-24] 20  BP: (102-175)/() 162/88  Vital Signs (last 72 hrs)       01/20 0700  -  01/21 0659 01/21 0700  -  01/22 0659 01/22 0700  -  01/23 0659 01/23 0700  -  01/23 1420   Most Recent    Temp (°F)     97.6 -  (!)103.1    98.5 -  98.7     98.7 (37.1)    Heart Rate     79 -  112    84 -  104     92    Resp     18 -  24    18 -  22     20    BP     102/54 -  164/90    127/97 -  175/91     162/88    SpO2 (%)     (!)69 -  98    91 -  100     98        Body mass index is 22.3 kg/(m^2).    Intake/Output Summary (Last 24 hours) at 01/23/18 1420  Last data filed at 01/23/18 1038   Gross per 24 hour   Intake          2988.33 ml   Output             1184 ml   Net          1804.33 ml     Physical Exam   Constitutional: She appears well-developed and well-nourished.   Acutely ill-looking lady who is alert, oriented to person and place and is in mild respiratory distress on high flow oxygen at this time.   HENT:   Head: Normocephalic and atraumatic.   Eyes: EOM are normal. No scleral icterus.   Neck: No JVD present. No tracheal deviation present. No thyromegaly present.   Cardiovascular: Normal rate, regular rhythm, S1 normal and S2 normal.  Exam reveals no gallop, no S3, no S4 and no friction rub.    Murmur heard.   Systolic murmur is present with a grade of 3/6   Pulses:       Dorsalis pedis pulses are 2+ on the right side, and 2+ on the left side.        Posterior tibial pulses are 2+ on the right side, and 2+ on the left side.   Grade 3/6 systolic ejection murmur the left sternal border.   Pulmonary/Chest: No respiratory distress. She has no wheezes. She has rales (bilateral scattered rhonchi).   Abdominal: She exhibits no distension and no  mass. There is no tenderness. There is no guarding.   Musculoskeletal: She exhibits no edema.   Neurological: She is alert. No cranial nerve deficit.   Skin: Skin is warm and dry.         Results Review:    I reviewed the patient's new clinical results.    Results from last 7 days  Lab Units 01/23/18  0658 01/23/18  0108 01/22/18  1939   CK TOTAL U/L 229*  229* 264* 276*  276*   TROPONIN I ng/mL 0.098* 0.146* 0.207*   CKMB ng/mL 6.13* 6.63* 5.84*       Results from last 7 days  Lab Units 01/23/18  0108 01/22/18  0931   WBC 10*3/mm3 5.75 6.21   HEMOGLOBIN g/dL 11.7* 12.5   PLATELETS 10*3/mm3 207 204       Results from last 7 days  Lab Units 01/23/18  0108 01/22/18  0931   SODIUM mmol/L 141 135   POTASSIUM mmol/L 3.8 3.6   CHLORIDE mmol/L 111 105   CO2 mmol/L 23.8* 23.0*   BUN mg/dL 22* 22*   CREATININE mg/dL 0.79 1.17   CALCIUM mg/dL 8.4 8.7   GLUCOSE mg/dL 99 106   ALT (SGPT) U/L  --  20   AST (SGOT) U/L  --  23     Lab Results   Component Value Date    INR 0.93 03/06/2017    INR <0.90 10/25/2016     Lab Results   Component Value Date    MG 2.7 (H) 01/23/2018    MG 1.4 (C) 01/23/2018     Lab Results   Component Value Date    TSH 0.267 (L) 01/22/2018      Lab Results   Component Value Date    BNP 80.0 01/23/2018    BNP 85.0 01/22/2018    BNP 23 12/12/2014       ECG          Imaging Results (last 72 hours)     Procedure Component Value Units Date/Time    XR Chest 1 View [[id number]] Updated:  01/22/18 1002    CT Abdomen Pelvis Without Contrast [[id number]] Collected:  01/22/18 1013     Updated:  01/22/18 1018    Narrative:       CT ABDOMEN PELVIS WO CONTRAST-     CLINICAL INDICATION: Abdominal pain, fever          COMPARISON: 11/15/2016     TECHNIQUE: Axial images were acquired from the lung bases through the  pubic symphysis without any IV or oral contrast.  Reformatted images were created in both the coronal and sagittal planes.     Radiation dose reduction techniques were utilized per ALARA protocol.  Automated  exposure control was initiated through either or CareDose or  DoseRight software packages by  protocol.           FINDINGS:   There is airspace disease in the lingula of the left upper lobe and in  the left lower lobe     The liver is homogeneous. There is no evidence of focal hepatic mass     The spleen is homogeneous     There is no peripancreatic stranding or pancreatic head mass.     There is no adrenal enlargement.     The kidneys show no evidence of hydronephrosis or hydroureter. I do not  see any distal ureteral stones.      Otherwise I do not see any free fluid or walled off fluid collections.     There are sigmoid diverticuli but no evidence of diverticulitis at this  time     Arthritic change in the spine     There is no evidence of mesenteric or retroperitoneal adenopathy               Impression:       : Left-sided pneumonia  Arthritic changes in the spine  Other findings as above                This report was finalized on 1/22/2018 10:16 AM by Dr. Fabio Zheng MD.       CT Head Without Contrast [[id number]] Collected:  01/22/18 1016     Updated:  01/22/18 1018    Narrative:       CT HEAD WO CONTRAST-     CLINICAL INDICATION: Confusion/delirium, altered LOC, unexplained          COMPARISON: 3/6/2017      TECHNIQUE: Axial images of the brain were obtained with out intravenous  contrast.  Reformatted images were created in the sagittal and coronal  planes.     DOSE:         Radiation dose reduction techniques were utilized per ALARA protocol.  Automated exposure control was initiated through either or [employer] or  [employer] software packages by  protocol.           FINDINGS:   Today's study shows no mass, hemorrhage, or midline shift.   The ventricles, cisterns, and sulci are unremarkable. There is no  hydrocephalus.   There is no evidence of acute ischemia.  I do not see epidural or subdural hematoma.  There is global atrophy  The bone window setting images show no destructive  calvarial lesion or  acute calvarial fracture.   The posterior fossa is unremarkable.             Impression:       No acute intracranial pathology. Nothing is seen on this exam to  specifically account for the patient's symptoms.     This report was finalized on 1/22/2018 10:16 AM by Dr. Fabio Zheng MD.       XR Chest 1 View [[id number]] Collected:  01/23/18 0835     Updated:  01/23/18 0908    Narrative:       XR CHEST 1 VIEW-     CLINICAL INDICATION: Acute respiratory failure, s/p central line;  J18.1-Lobar pneumonia, unspecified organism; A41.9-Sepsis, unspecified  organism; R41.82-Altered mental status, unspecified; J96.01-Acute  respiratory failure with hypoxia; J96.02-Acute respiratory failure with  hypercapnia.          COMPARISON: 10/25/2016.      TECHNIQUE: Single frontal view of the chest.     FINDINGS:     Right-sided central line is present. The patient is rotated. There is no  pneumothorax.  Right basilar atelectasis or pneumonia.  There is no evidence of an acute osseous abnormality.   There are no suspicious-appearing parenchymal soft tissue nodules.            Impression:       Right basilar atelectasis or pneumonia.         This report was finalized on 1/23/2018 9:06 AM by Dr. Fabio Zheng MD.       FL Fiberoptic Endo (fees) [[id number]] Updated:  01/23/18 1034              Thank you very much for asking us to be involved in this patient's care.  We will follow along with you.      Macho Cruz MD,[facility]  01/23/18  2:20 PM    Dragon disclaimer:  Much of this encounter note is an electronic transcription/translation of spoken language to printed text. The electronic translation of spoken language may permit erroneous, or at times, nonsensical words or phrases to be inadvertently transcribed; Although I have reviewed the note for such errors, some may still exist.

## 2018-01-23 NOTE — PROGRESS NOTES
Subjective     History:   Amy Do is a 82 y.o. female admitted on 1/22/2018 secondary to Pneumonia of left lower lobe due to infectious organism     Procedures:   1/22/18: Right subclavian central line placement in the ED    Patient seen and examined with ANTWAN Patel. Awake and alert. Currently oriented and follows commands. Unsure of what brought her to the hospital. Reports some dyspnea. Denies significant cough. Now on HFNC. RN reports some confusion but no acute events overnight per RN.     History taken from: patient, chart, and RN.      Objective     Vital Signs  Temp:  [97.6 °F (36.4 °C)-100.6 °F (38.1 °C)] 98.5 °F (36.9 °C)  Heart Rate:  [] 104  Resp:  [18-24] 22  BP: (102-155)/(47-97) 127/97    Intake/Output Summary (Last 24 hours) at 01/23/18 0944  Last data filed at 01/23/18 0851   Gross per 24 hour   Intake          2738.33 ml   Output             1184 ml   Net          1554.33 ml         Physical Exam:  General:    Awake, alert, in no acute distress, chronically ill appearing   Heart:      Normal S1 and S2. Regular rate and rhythm. No significant murmur, rubs or gallops appreciated.   Lungs:     Respirations regular, even and unlabored. Bilateral rhonchi.    Abdomen:   Soft and nontender. No guarding, rebound tenderness or  organomegaly noted. Bowel sounds present x 4.   Extremities:  No clubbing, cyanosis or edema noted. Moves UE and LE equally B/L.     Results Review:      Results from last 7 days  Lab Units 01/23/18  0108 01/22/18  0931   WBC 10*3/mm3 5.75 6.21   HEMOGLOBIN g/dL 11.7* 12.5   PLATELETS 10*3/mm3 207 204       Results from last 7 days  Lab Units 01/23/18  0108 01/22/18  0931   SODIUM mmol/L 141 135   POTASSIUM mmol/L 3.8 3.6   CHLORIDE mmol/L 111 105   CO2 mmol/L 23.8* 23.0*   BUN mg/dL 22* 22*   CREATININE mg/dL 0.79 1.17   CALCIUM mg/dL 8.4 8.7   GLUCOSE mg/dL 99 106       Results from last 7 days  Lab Units 01/22/18  0931   BILIRUBIN mg/dL 0.3   ALK PHOS U/L 68   AST (SGOT)  U/L 23   ALT (SGPT) U/L 20       Results from last 7 days  Lab Units 01/23/18  0658 01/23/18  0108   MAGNESIUM mg/dL 2.7* 1.4*           Results from last 7 days  Lab Units 01/23/18  0658 01/23/18  0108 01/22/18  1939   CK TOTAL U/L 229*  229* 264* 276*  276*   TROPONIN I ng/mL 0.098* 0.146* 0.207*   CK MB INDEX % 2.7 2.5 2.1       Imaging Results (last 24 hours)     Procedure Component Value Units Date/Time    XR Chest 1 View [[id number]] Updated:  01/22/18 1002    CT Abdomen Pelvis Without Contrast [[id number]] Collected:  01/22/18 1013     Updated:  01/22/18 1018    Narrative:       CT ABDOMEN PELVIS WO CONTRAST-     CLINICAL INDICATION: Abdominal pain, fever          COMPARISON: 11/15/2016     TECHNIQUE: Axial images were acquired from the lung bases through the  pubic symphysis without any IV or oral contrast.  Reformatted images were created in both the coronal and sagittal planes.     Radiation dose reduction techniques were utilized per ALARA protocol.  Automated exposure control was initiated through either or CareDose or  DoseRight software packages by  protocol.           FINDINGS:   There is airspace disease in the lingula of the left upper lobe and in  the left lower lobe     The liver is homogeneous. There is no evidence of focal hepatic mass     The spleen is homogeneous     There is no peripancreatic stranding or pancreatic head mass.     There is no adrenal enlargement.     The kidneys show no evidence of hydronephrosis or hydroureter. I do not  see any distal ureteral stones.      Otherwise I do not see any free fluid or walled off fluid collections.     There are sigmoid diverticuli but no evidence of diverticulitis at this  time     Arthritic change in the spine     There is no evidence of mesenteric or retroperitoneal adenopathy               Impression:       : Left-sided pneumonia  Arthritic changes in the spine  Other findings as above                This report was finalized on  1/22/2018 10:16 AM by Dr. Fabio Zheng MD.       CT Head Without Contrast [[id number]] Collected:  01/22/18 1016     Updated:  01/22/18 1018    Narrative:       CT HEAD WO CONTRAST-     CLINICAL INDICATION: Confusion/delirium, altered LOC, unexplained          COMPARISON: 3/6/2017      TECHNIQUE: Axial images of the brain were obtained with out intravenous  contrast.  Reformatted images were created in the sagittal and coronal  planes.     DOSE:         Radiation dose reduction techniques were utilized per ALARA protocol.  Automated exposure control was initiated through either or [employer] or  DoseRight software packages by  protocol.           FINDINGS:   Today's study shows no mass, hemorrhage, or midline shift.   The ventricles, cisterns, and sulci are unremarkable. There is no  hydrocephalus.   There is no evidence of acute ischemia.  I do not see epidural or subdural hematoma.  There is global atrophy  The bone window setting images show no destructive calvarial lesion or  acute calvarial fracture.   The posterior fossa is unremarkable.             Impression:       No acute intracranial pathology. Nothing is seen on this exam to  specifically account for the patient's symptoms.     This report was finalized on 1/22/2018 10:16 AM by Dr. Fabio Zheng MD.       XR Chest 1 View [[id number]] Collected:  01/23/18 0835     Updated:  01/23/18 0908    Narrative:       XR CHEST 1 VIEW-     CLINICAL INDICATION: Acute respiratory failure, s/p central line;  J18.1-Lobar pneumonia, unspecified organism; A41.9-Sepsis, unspecified  organism; R41.82-Altered mental status, unspecified; J96.01-Acute  respiratory failure with hypoxia; J96.02-Acute respiratory failure with  hypercapnia.          COMPARISON: 10/25/2016.      TECHNIQUE: Single frontal view of the chest.     FINDINGS:     Right-sided central line is present. The patient is rotated. There is no  pneumothorax.  Right basilar atelectasis or pneumonia.  There  is no evidence of an acute osseous abnormality.   There are no suspicious-appearing parenchymal soft tissue nodules.            Impression:       Right basilar atelectasis or pneumonia.         This report was finalized on 1/23/2018 9:06 AM by Dr. Fabio Zheng MD.               Medications:    aspirin 81 mg Oral Daily   aztreonam 2 g Intravenous Q8H   heparin (porcine) 5,000 Units Subcutaneous Q12H   insulin aspart 0-7 Units Subcutaneous 4x Daily AC & at Bedtime   ipratropium-albuterol 3 mL Nebulization Q6H - RT   lactobacillus acidophilus 1 capsule Oral Daily   methylPREDNISolone sodium succinate 40 mg Intravenous Q12H   metroNIDAZOLE 500 mg Intravenous Q8H   nystatin  Topical Q12H   sodium bicarbonate 650 mg Oral 4x Daily   vancomycin 1,000 mg Intravenous Q18H       Pharmacy to dose vancomycin            Assessment/Plan   Severe sepsis: Likely 2/2 left-sided pneumonia. Currently hemodynamically stable. WBC is stable. Lactic acid is normal. C diff is negative. Cultures with NGTD. Currently on broad spectrum IV antibiotics including Vanc, Azactam and Flagyl. Cont to follow cultures and repeat labs in the AM.     Acute hypoxic and hypercapnic respiratory failure: Likely multifactorial 2/2 pneumonia, COPD exacerbation and possible oversedation with opioids. Pulm has transitioned her from BiPAP to HFNC this AM. Cont to monitor closely in the CCU. Pulm input appreciated.     Left-sided pneumonia: Mycoplasma is negative. Legionella ordered but not yet collected. Speech eval ordered. Cont antibiotics as outlined above. Repeat CXR in the AM.     Acute exacerbation of COPD: Cont treatment as outlined above.     Metabolic encephalopathy: CT head negative for any acute abnormalities. Pt's mental status improved with Narcan in the ED. Appears improved today. Cont treatment as outlined above and supportive treatment.     Indeterminate range troponin elevation: Possibly 2/2 above. Serial CE's have trended downward. Order echo.  Monitor on telemetry.     Arthritis: Concern for medication misuse upon admission. Will hold any potential sedating medications.     DVT PPX: SQ heparin    Discussed with Dr. Timmons.     Pt is at high risk 2/2 severe sepsis, resp failure, pneumonia, COPD exacerbation, metabolic encephalopathy, concern for medication misuse and advanced age.       Aroldo Gonsalez,   01/23/18  9:44 AM

## 2018-01-23 NOTE — PLAN OF CARE
Problem: Inpatient Physical Therapy  Goal: Gait Training Goal LTG- PT  Outcome: Ongoing (interventions implemented as appropriate)   01/23/18 1828   Gait Training PT LTG   Gait Training Goal PT LTG, Date Established 01/23/18   Gait Training Goal PT LTG, Time to Achieve by discharge   Gait Training Goal PT LTG, [place] Level contact guard assist   Gait Training Goal PT LTG, Assist Device walker, rolling   Gait Training Goal PT LTG, Distance to Achieve 80

## 2018-01-23 NOTE — PLAN OF CARE
Problem: Pneumonia (Adult)  Goal: Signs and Symptoms of Listed Potential Problems Will be Absent or Manageable (Pneumonia)  Outcome: Ongoing (interventions implemented as appropriate)   01/23/18 1000   Pneumonia   Problems Assessed (Pneumonia) all   Problems Present (Pneumonia) none       Problem: Fall Risk (Adult)  Goal: Identify Related Risk Factors and Signs and Symptoms  Outcome: Ongoing (interventions implemented as appropriate)    Goal: Absence of Falls  Outcome: Ongoing (interventions implemented as appropriate)   01/23/18 1000   Fall Risk (Adult)   Absence of Falls making progress toward outcome       Problem: Skin Integrity Impairment, Risk/Actual (Adult)  Goal: Identify Related Risk Factors and Signs and Symptoms  Outcome: Ongoing (interventions implemented as appropriate)    Goal: Skin Integrity/Wound Healing  Outcome: Ongoing (interventions implemented as appropriate)   01/23/18 1000   Skin Integrity Impairment, Risk/Actual (Adult)   Skin Integrity/Wound Healing making progress toward outcome       Problem: Pressure Ulcer (Adult)  Goal: Signs and Symptoms of Listed Potential Problems Will be Absent or Manageable (Pressure Ulcer)  Outcome: Ongoing (interventions implemented as appropriate)   01/23/18 1000   Pressure Ulcer   Problems Assessed (Pressure Ulcer) all   Problems Present (Pressure Ulcer) none       Problem: Patient Care Overview (Adult)  Goal: Plan of Care Review  Outcome: Ongoing (interventions implemented as appropriate)   01/23/18 1000   Coping/Psychosocial Response Interventions   Plan Of Care Reviewed With patient;pedro   Patient Care Overview   Progress improving

## 2018-01-23 NOTE — MBS/VFSS/FEES
Acute Care - Speech Language Pathology   Swallow Initial Evaluation  [facility]   FIBEROPTIC ENDOSCOPIC EVALUATION OF SWALLOWING     Patient Name: Amy Do  : 1935  MRN: [id number]  Today's Date: 2018     Admit Date: 2018    Amy Do is seen in CCU CC10/1C. She is noted w/ admitting dx of PNA of LLL, h/o aspiration and dysphagia. MD notes indicate presentation to ED w/ increased ams, noted baseline dementia as well. Per this status she is felt to most benefit from instrumental FEES this am to determine candidacy for po intake. She is seen at bedside, resting upon entry but easily arouses to be a/a and cooperative. She is oriented to person, follow simple directives and participate in simple conversational exchanges. D/w her risks and benefits of dysphagia evaluation w/ verbal agreement to participate in an instrumental FEES to objectively evaluate safety/efficacy of swallowing fnx, determine safest/least restrictive diet tolerance. RNAmanda, is present for part of this evaluation.     Social History     Social History   • Marital status:      Spouse name: N/A   • Number of children: N/A   • Years of education: N/A     Occupational History   • Not on file.     Social History Main Topics   • Smoking status: Former Smoker     Types: Cigarettes   • Smokeless tobacco: Not on file   • Alcohol use No   • Drug use: No   • Sexual activity: Defer     Other Topics Concern   • Not on file     Social History Narrative      Chest xray 18 reveals R basilar atelectasis or PNA per radiologist.     CT of abdomen and pelvis 18 reveals airspace disease in the lingula of the left upper lobe and in the left lower lobe per radiologist.     Diet Orders (active)     Start     Ordered    18  NPO Diet  Diet Effective Now      18        Currently observed on O2 via HF NC w/o complications.     Risks and benefits of this procedure are explained w/ pt agreeing to  participate.    Chart review, d/w RN reveal no contraindications for this procedure. Proceed per protocol. Vitals are stable.     Pt is positioned upright and centered in bed to accept presentations of solid cracker, puree, honey thick, nectar thick, and thin liquids via spoon, cup, and straw. Pt does not self feed during this evaluation.     Facial/oral structures are symmetrical upon observation. Oral mucosa are moist, pink and clean. Secretions are clear, thin, and well controlled. OROM/MIKI is wfl to imitate oral postures. She is edentulous w/ upper dentures present and in place for this evaluation. Gag is not assessed. Volitional cough is intact, congestive in quality, non-productive. Vocal quality is adequate in intensity, clear in quality w/ intelligible speech.     Endoscope is entered via the R nare w/o complications. Nasal mucosa are moist, pink, and clean. Secretions are clear, thin, controlled. Endoscope passes easily into the hypopharynx.     Upon entry into the hypopharynx, epiglottic resting posture is midline. Pharyngeal and laryngeal mucosa/structures are moist, pink, and clean. No pooling. No significant edema or erythema. Cued vowel prolongation reveal bilateral VF adduction. No obvious mucosal abnormalities.     Upon po presentations, adequate bolus anticipation w/ good labial seal for bolus clearance. Bolus formation, manipulation, and mastication are only slightly weak. Oral prep time is mildly increased w/ solid, noted ill-fitting upper dentures. Transit is timely w/o significant oral residue. Linguaseal is complete w/o significant premature spillage. No laryngeal penetration or aspiration evidenced before the swallow.     Pharyngeal swallow is timely w/ adequate hyolaryngeal elevation and full epilgottic inversion. Pharyngeal contraction is wfl w/o significant residue. No laryngeal penetration or aspiration evidenced during or after the swallow.     Endoscope is removed w/o complications. Bed  is returned to lowest floor position w/ call bell within reach. RN is aware of pt status. Vitals are stable.     Impression: Per this evaluation, pt presents at baseline swallowing fnx w/ baseline mastication weakness, otherwise wfl oropharyngeal swallow w/o laryngeal penetration or aspiration. She will benefit from continuing mechanical soft diet w/ thin liquids.     Recommmendations:  1. Mechanical soft, chopped meats, thin liquids.   2. Meds whole in puree.   3. Upright and centered for all po intake.   4. Fully a/a for all po intake.   5. DU precautions.   6. Oral care protocol.   7. Assist w/ meals PRN.   No further formal SLP f/u recommended.     D/w pt results and recommendations w/ verbal agreement.     D/w RNAmanda, results and recommendations w/ verbal agreement.     Thank you for allowing me to participate in the care of your patient-  Nancy Marquez M.A., CCC-SLP    Visit Dx:     ICD-10-CM ICD-9-CM   1. Pneumonia of left lower lobe due to infectious organism J18.1 486   2. Sepsis, due to unspecified organism A41.9 038.9     995.91   3. Altered mental status, unspecified altered mental status type R41.82 780.97   4. Acute respiratory failure with hypoxia and hypercapnia J96.01 518.81    J96.02      Patient Active Problem List   Diagnosis   • Pneumonia of left lower lobe due to infectious organism     Past Medical History:   Diagnosis Date   • Allergic rhinitis    • Arthritis    • COPD (chronic obstructive pulmonary disease)    • Dementia    • Depression    • Diverticulitis    • Dysphagia    • GERD (gastroesophageal reflux disease)    • Hypertension      Past Surgical History:   Procedure Laterality Date   • APPENDECTOMY     • CHOLECYSTECTOMY     • DENTAL PROCEDURE     • HEMORRHOIDECTOMY     • HYSTERECTOMY     • TONSILLECTOMY       EDUCATION  The patient has been educated in the following areas:   Dysphagia (Swallowing Impairment) Oral Care/Hydration NPO rationale.    SLP Recommendation and Plan  No  further SLP f/u warranted at this time.      Time Calculation:     Therapy Charges for Today     Code Description Service Date Service Provider Modifiers Qty    79531372990 HC ST SWALLOWING CURRENT STATUS 1/23/2018 Nancy Marquez MA,CCC-SLP GN, CI 1    96018155473 HC ST SWALLOWING PROJECTED 1/23/2018 Nancy Marquez MA,CCC-SLP GN, CI 1    75638748633 HC ST SWALLOWING DISCHARGE 1/23/2018 Nancy Marquez MA,CCC-SLP GN, CI 1    68760545388 HC ST FIBEROPTIC ENDO EVAL SWALL 8 1/23/2018 Nancy Marquez MA,CCC-SLP GN 1        SLP G-Codes  Functional Limitations: Swallowing  Swallow Current Status (): At least 1 percent but less than 20 percent impaired, limited or restricted  Swallow Goal Status (): At least 1 percent but less than 20 percent impaired, limited or restricted  Swallow Discharge Status (): At least 1 percent but less than 20 percent impaired, limited or restricted    Nancy Marquez MA,ERNESTO-SLP  1/23/2018

## 2018-01-23 NOTE — THERAPY EVALUATION
Acute Care - Physical Therapy Initial Evaluation   [facility]     Patient Name: Amy Do  : 1935  MRN: [id number]  Today's Date: 2018   Onset of Illness/Injury or Date of Surgery Date: 18  Date of Referral to PT: 18  Referring Physician: Dr. Broussard      Admit Date: 2018     Visit Dx:    ICD-10-CM ICD-9-CM   1. Pneumonia of left lower lobe due to infectious organism J18.1 486   2. Sepsis, due to unspecified organism A41.9 038.9     995.91   3. Altered mental status, unspecified altered mental status type R41.82 780.97   4. Acute respiratory failure with hypoxia and hypercapnia J96.01 518.81    J96.02      Patient Active Problem List   Diagnosis   • Pneumonia of left lower lobe due to infectious organism     Past Medical History:   Diagnosis Date   • Allergic rhinitis    • Arthritis    • COPD (chronic obstructive pulmonary disease)    • Dementia    • Depression    • Diverticulitis    • Dysphagia    • GERD (gastroesophageal reflux disease)    • Hypertension      Past Surgical History:   Procedure Laterality Date   • APPENDECTOMY     • CHOLECYSTECTOMY     • DENTAL PROCEDURE     • HEMORRHOIDECTOMY     • HYSTERECTOMY     • TONSILLECTOMY            PT ASSESSMENT (last 72 hours)      PT Evaluation       18 1800 18 0200    Rehab Evaluation    Document Type evaluation  -BC     Subjective Information no complaints;agree to therapy  -BC     Patient Effort, Rehab Treatment adequate  -BC     General Information    Patient Profile Review yes  -BC     Onset of Illness/Injury or Date of Surgery Date 18  -BC     Referring Physician Dr. Broussard  -BC     General Observations Pt. supine in bed, awake and alert. No distress notedl  -BC     Equipment Currently Used at Home walker, standard  -BC     Plans/Goals Discussed With patient;agreed upon  -BC     Risks Reviewed patient:;LOB;nausea/vomiting;dizziness;increased discomfort;change in vital signs;increased drainage;lines disloged  -BC      Benefits Reviewed patient:;improve function;increase independence;increase strength;increase balance;decrease pain;decrease risk of DVT;increase knowledge  -BC     Living Environment    Lives With facility resident  -BC     Living Arrangements residential facility  -BC     Clinical Impression    Date of Referral to PT 01/23/18  -BC     Functional Level At Time Of Evaluation good  -BC     Criteria for Skilled Therapeutic Interventions Met yes;treatment indicated  -BC     Predicted Duration of Therapy Intervention (days/wks) LOS  -BC     Pain Assessment    Pain Assessment No/denies pain  -BC     Cognitive Assessment/Intervention    Current Cognitive/Communication Assessment functional  -BC     Orientation Status oriented to;person;situation;place  -BC     Follows Commands/Answers Questions 75% of the time;able to follow single-step instructions  -BC     Personal Safety mild impairment;decreased awareness, need for assist;decreased awareness, need for safety  -BC     Personal Safety Interventions fall prevention program maintained;gait belt;muscle strengthening facilitated;nonskid shoes/slippers when out of bed  -BC     ROM (Range of Motion)    General ROM no range of motion deficits identified  -BC     MMT (Manual Muscle Testing)    General MMT Assessment lower extremity strength deficits identified  -BC     General MMT Assessment Detail BLE MMT 3/5  -BC     Muscle Tone Assessment    Muscle Tone Assessment  Bilateral Upper Extremities;Bilateral Lower Extremities  -    Bilateral Upper Extremities Muscle Tone Assessment  mildly decreased tone  -    Bilateral Lower Extremities Muscle Tone Assessment  moderately decreased tone  -    Bed Mobility, Assessment/Treatment    Bed Mobility, Assistive Device bed rails  -BC     Bed Mobility, Roll Left, [place] moderate assist (50% patient effort);2 person assist required  -BC     Bed Mobility, Roll Right, [place] moderate assist (50% patient effort);2 person  assist required  -BC     Bed Mob, Supine to Sit, [place] moderate assist (50% patient effort);2 person assist required  -BC     Bed Mob, Sit to Supine, [place] moderate assist (50% patient effort);2 person assist required  -BC     Transfer Assessment/Treatment    Transfers, Sit-Stand [place] moderate assist (50% patient effort);2 person assist required  -BC     Transfers, Stand-Sit [place] moderate assist (50% patient effort);2 person assist required  -BC     Transfers, Sit-Stand-Sit, Assist Device rolling walker  -BC     Gait Assessment/Treatment    Gait, [place] Level moderate assist (50% patient effort);2 person assist required  -BC     Gait, Assistive Device rolling walker  -BC     Gait, Distance (Feet) 30  -BC     Positioning and Restraints    Pre-Treatment Position in bed  -BC     Post Treatment Position bed  -BC     In Bed notified nsg;supine;call light within reach;encouraged to call for assist;side rails up x3  -BC       01/22/18 1905 01/22/18 1711    General Information    Equipment Currently Used at Home walker, standard  -JH     Living Environment    Lives With  facility resident  -PL    Living Arrangements  residential facility  -PL    Transportation Available  ambulance  -    Muscle Tone Assessment    Muscle Tone Assessment Bilateral Upper Extremities;Bilateral Lower Extremities  -     Bilateral Upper Extremities Muscle Tone Assessment mildly decreased tone  -JH     Bilateral Lower Extremities Muscle Tone Assessment moderately decreased tone  -       User Key  (r) = Recorded By, (t) = Taken By, (c) = Cosigned By    Initials Name Provider Type    ROBERT Mckeon, PT Physical Therapist    PL Carolina Guzmán, RN Case Manager     Jhonatan Kern, RN Registered Nurse          Physical Therapy Education     Title: PT OT SLP Therapies (Done)     Topic: Physical Therapy (Done)     Point: Mobility training (Done)    Learning Progress Summary    Learner Readiness  Method Response Comment Documented by Status   Patient Acceptance E [facility] 01/23/18 1826 Done               Point: Home exercise program (Done)    Learning Progress Summary    Learner Readiness Method Response Comment Documented by Status   Patient Acceptance E [facility] 01/23/18 1826 Done               Point: Body mechanics (Done)    Learning Progress Summary    Learner Readiness Method Response Comment Documented by Status   Patient Acceptance E [facility] 01/23/18 1826 Done               Point: Precautions (Done)    Learning Progress Summary    Learner Readiness Method Response Comment Documented by Status   Patient Acceptance E [facility] 01/23/18 1826 Done                      User Key     Initials Effective Dates Name Provider Type Discipline    BC 03/14/16 -  Josephine Mckeon, PT Physical Therapist PT                PT Recommendation and Plan  Planned Therapy Interventions: balance training, bed mobility training, gait training, home exercise program, patient/family education, strengthening, transfer training  PT Frequency: 3-5 times/wk, per priority policy             IP PT Goals       01/23/18 1828 01/23/18 1827       Bed Mobility PT LTG    Bed Mobility PT LTG, Date Established  01/23/18  -BC     Bed Mobility PT LTG, Time to Achieve  by discharge  -BC     Bed Mobility PT LTG, Activity Type  all bed mobility  -BC     Bed Mobility PT LTG, [place] Level  contact guard assist  -BC     Bed Mobility PT Goal  LTG, Assist Device  bed rails  -BC     Transfer Training PT LTG    Transfer Training PT LTG, Date Established  01/23/18  -BC     Transfer Training PT LTG, Time to Achieve  by discharge  -BC     Transfer Training PT LTG, Activity Type  all transfers  -BC     Transfer Training PT LTG, [place] Level  contact guard assist  -BC     Transfer Training PT LTG, Assist Device  walker, rolling  -BC     Gait Training PT LTG    Gait Training Goal PT LTG, Date Established 01/23/18  -BC 01/23/18  -BC     Gait Training Goal  PT LTG, Time to Achieve by discharge  -BC by discharge  -BC     Gait Training Goal PT LTG, [place] Level contact guard assist  -BC contact guard assist  -BC     Gait Training Goal PT LTG, Assist Device walker, rolling  -BC walker, rolling  -BC     Gait Training Goal PT LTG, Distance to Achieve 80  -BC 80  -BC       User Key  (r) = Recorded By, (t) = Taken By, (c) = Cosigned By    Initials Name Provider Type    ROBERT Mckeon PT Physical Therapist                Outcome Measures       01/23/18 1800          How much help from another person do you currently need...    Turning from your back to your side while in flat bed without using bedrails? 3  -BC      Moving from lying on back to sitting on the side of a flat bed without bedrails? 3  -BC      Moving to and from a bed to a chair (including a wheelchair)? 2  -BC      Standing up from a chair using your arms (e.g., wheelchair, bedside chair)? 3  -BC      Climbing 3-5 steps with a railing? 1  -BC      To walk in hospital room? 3  -BC      AM-PAC 6 Clicks Score 15  -BC      Functional Assessment    Outcome Measure Options AM-PAC 6 Clicks Basic Mobility (PT)  -BC        User Key  (r) = Recorded By, (t) = Taken By, (c) = Cosigned By    Initials Name Provider Type    ROBERT Mckeon PT Physical Therapist           Time Calculation:         PT Charges       01/23/18 1830          Time Calculation    Start Time --   60  -BC      PT Received On 01/23/18  -BC      PT Goal Re-Cert Due Date 02/06/18  -BC        User Key  (r) = Recorded By, (t) = Taken By, (c) = Cosigned By    Initials Name Provider Type    ROBERT Mckeon PT Physical Therapist          Therapy Charges for Today     Code Description Service Date Service Provider Modifiers Qty    72152191826 HC PT MOBILITY CURRENT 1/23/2018 Josephine Mckeon, PT GP, CK 1    59161637046 HC PT MOBILITY PROJECTED 1/23/2018 Josephine Mckeon, PT GP, CJ 1    44331017794 HC GAIT TRAINING EA 15 MIN 1/23/2018 Josephine  LEIGHANN Mckeon, PT GP 1    78804197960 HC PT EVAL MOD COMPLEXITY 2 1/23/2018 Josephine Mckeon, PT GP 1    96003955051 HC PT THERAPEUTIC ACT EA 15 MIN 1/23/2018 Josephine Mckeon, PT GP 1    60137030819 HC PT THER SUPP EA 15 MIN 1/23/2018 Josephine Mckeon, PT GP 4          PT G-Codes  Outcome Measure Options: AM-PAC 6 Clicks Basic Mobility (PT)  Score: 15  Functional Limitation: Mobility: Walking and moving around  Mobility: Walking and Moving Around Current Status (): At least 40 percent but less than 60 percent impaired, limited or restricted  Mobility: Walking and Moving Around Goal Status (): At least 20 percent but less than 40 percent impaired, limited or restricted      Josephine Mckeon, PT  1/23/2018

## 2018-01-24 ENCOUNTER — APPOINTMENT (OUTPATIENT)
Dept: GENERAL RADIOLOGY | Facility: HOSPITAL | Age: 83
End: 2018-01-24

## 2018-01-24 LAB
A-A DO2: 269.6 MMHG (ref 0–300)
ALBUMIN SERPL-MCNC: 3.4 G/DL (ref 3.4–4.8)
ALBUMIN/GLOB SERPL: 1.5 G/DL (ref 1.5–2.5)
ALP SERPL-CCNC: 63 U/L (ref 35–104)
ALT SERPL W P-5'-P-CCNC: 22 U/L (ref 10–36)
AMYLASE SERPL-CCNC: 16 U/L (ref 28–100)
ANION GAP SERPL CALCULATED.3IONS-SCNC: 6 MMOL/L (ref 3.6–11.2)
ARTERIAL PATENCY WRIST A: POSITIVE
AST SERPL-CCNC: 22 U/L (ref 10–30)
ATMOSPHERIC PRESS: 724 MMHG
BACTERIA SPEC AEROBE CULT: NORMAL
BASE EXCESS BLDA CALC-SCNC: 0.8 MMOL/L
BASOPHILS # BLD AUTO: 0 10*3/MM3 (ref 0–0.3)
BASOPHILS NFR BLD AUTO: 0 % (ref 0–2)
BDY SITE: ABNORMAL
BILIRUB SERPL-MCNC: 0.2 MG/DL (ref 0.2–1.8)
BODY TEMPERATURE: 98.6 C
BUN BLD-MCNC: 13 MG/DL (ref 7–21)
BUN/CREAT SERPL: 24.1 (ref 7–25)
CALCIUM SPEC-SCNC: 8.3 MG/DL (ref 7.7–10)
CHLORIDE SERPL-SCNC: 109 MMOL/L (ref 99–112)
CHOLEST SERPL-MCNC: 100 MG/DL (ref 0–200)
CO2 SERPL-SCNC: 26 MMOL/L (ref 24.3–31.9)
COHGB MFR BLD: 1.9 % (ref 0–5)
CREAT BLD-MCNC: 0.54 MG/DL (ref 0.43–1.29)
CRP SERPL-MCNC: 8.67 MG/DL (ref 0–0.99)
DEPRECATED RDW RBC AUTO: 46.1 FL (ref 37–54)
EOSINOPHIL # BLD AUTO: 0 10*3/MM3 (ref 0–0.7)
EOSINOPHIL NFR BLD AUTO: 0 % (ref 0–7)
ERYTHROCYTE [DISTWIDTH] IN BLOOD BY AUTOMATED COUNT: 14.3 % (ref 11.5–14.5)
GAS FLOW AIRWAY: 45 LPM
GFR SERPL CREATININE-BSD FRML MDRD: 108 ML/MIN/1.73
GLOBULIN UR ELPH-MCNC: 2.2 GM/DL
GLUCOSE BLD-MCNC: 118 MG/DL (ref 70–110)
GLUCOSE BLDC GLUCOMTR-MCNC: 100 MG/DL (ref 70–130)
GLUCOSE BLDC GLUCOMTR-MCNC: 101 MG/DL (ref 70–130)
GLUCOSE BLDC GLUCOMTR-MCNC: 106 MG/DL (ref 70–130)
HCO3 BLDA-SCNC: 27.4 MMOL/L (ref 22–26)
HCT VFR BLD AUTO: 38 % (ref 37–47)
HCT VFR BLD CALC: 36 % (ref 37–47)
HDLC SERPL-MCNC: 35 MG/DL (ref 60–100)
HGB BLD-MCNC: 11.9 G/DL (ref 12–16)
HGB BLDA-MCNC: 12.2 G/DL (ref 12–16)
HOROWITZ INDEX BLD+IHG-RTO: 57 %
IMM GRANULOCYTES # BLD: 0.02 10*3/MM3 (ref 0–0.03)
IMM GRANULOCYTES NFR BLD: 0.4 % (ref 0–0.5)
LDLC SERPL CALC-MCNC: 50 MG/DL (ref 0–100)
LDLC/HDLC SERPL: 1.42 {RATIO}
LIPASE SERPL-CCNC: 33 U/L (ref 13–60)
LYMPHOCYTES # BLD AUTO: 0.48 10*3/MM3 (ref 1–3)
LYMPHOCYTES NFR BLD AUTO: 9.5 % (ref 16–46)
MAGNESIUM SERPL-MCNC: 2 MG/DL (ref 1.7–2.6)
MCH RBC QN AUTO: 28.3 PG (ref 27–33)
MCHC RBC AUTO-ENTMCNC: 31.3 G/DL (ref 33–37)
MCV RBC AUTO: 90.5 FL (ref 80–94)
METHGB BLD QL: 0.3 % (ref 0–3)
MODALITY: ABNORMAL
MONOCYTES # BLD AUTO: 0.37 10*3/MM3 (ref 0.1–0.9)
MONOCYTES NFR BLD AUTO: 7.3 % (ref 0–12)
NEUTROPHILS # BLD AUTO: 4.18 10*3/MM3 (ref 1.4–6.5)
NEUTROPHILS NFR BLD AUTO: 82.8 % (ref 40–75)
OSMOLALITY SERPL CALC.SUM OF ELEC: 282.5 MOSM/KG (ref 273–305)
OXYHGB MFR BLDV: 87.9 % (ref 85–100)
PCO2 BLDA: 52.1 MM HG (ref 35–45)
PH BLDA: 7.34 PH UNITS (ref 7.35–7.45)
PHOSPHATE SERPL-MCNC: 1.7 MG/DL (ref 2.7–4.5)
PLATELET # BLD AUTO: 212 10*3/MM3 (ref 130–400)
PMV BLD AUTO: 9.1 FL (ref 6–10)
PO2 BLDA: 58.6 MM HG (ref 80–100)
POTASSIUM BLD-SCNC: 3.3 MMOL/L (ref 3.5–5.3)
PROT SERPL-MCNC: 5.6 G/DL (ref 6–8)
RBC # BLD AUTO: 4.2 10*6/MM3 (ref 4.2–5.4)
SAO2 % BLDCOA: 89.9 % (ref 90–100)
SODIUM BLD-SCNC: 141 MMOL/L (ref 135–153)
TRIGL SERPL-MCNC: 77 MG/DL (ref 0–150)
VLDLC SERPL-MCNC: 15.4 MG/DL
WBC NRBC COR # BLD: 5.05 10*3/MM3 (ref 4.5–12.5)

## 2018-01-24 PROCEDURE — 83050 HGB METHEMOGLOBIN QUAN: CPT | Performed by: INTERNAL MEDICINE

## 2018-01-24 PROCEDURE — 36600 WITHDRAWAL OF ARTERIAL BLOOD: CPT | Performed by: INTERNAL MEDICINE

## 2018-01-24 PROCEDURE — 71045 X-RAY EXAM CHEST 1 VIEW: CPT | Performed by: RADIOLOGY

## 2018-01-24 PROCEDURE — 82805 BLOOD GASES W/O2 SATURATION: CPT | Performed by: INTERNAL MEDICINE

## 2018-01-24 PROCEDURE — 99233 SBSQ HOSP IP/OBS HIGH 50: CPT | Performed by: INTERNAL MEDICINE

## 2018-01-24 PROCEDURE — 84100 ASSAY OF PHOSPHORUS: CPT | Performed by: INTERNAL MEDICINE

## 2018-01-24 PROCEDURE — 94799 UNLISTED PULMONARY SVC/PX: CPT

## 2018-01-24 PROCEDURE — 82150 ASSAY OF AMYLASE: CPT | Performed by: INTERNAL MEDICINE

## 2018-01-24 PROCEDURE — 82962 GLUCOSE BLOOD TEST: CPT

## 2018-01-24 PROCEDURE — 99291 CRITICAL CARE FIRST HOUR: CPT | Performed by: INTERNAL MEDICINE

## 2018-01-24 PROCEDURE — 25010000002 MIDAZOLAM PER 1 MG

## 2018-01-24 PROCEDURE — 71045 X-RAY EXAM CHEST 1 VIEW: CPT

## 2018-01-24 PROCEDURE — 32550 INSERT PLEURAL CATH: CPT | Performed by: INTERNAL MEDICINE

## 2018-01-24 PROCEDURE — 83690 ASSAY OF LIPASE: CPT | Performed by: INTERNAL MEDICINE

## 2018-01-24 PROCEDURE — 25010000002 FENTANYL CITRATE (PF) 100 MCG/2ML SOLUTION

## 2018-01-24 PROCEDURE — 80053 COMPREHEN METABOLIC PANEL: CPT | Performed by: INTERNAL MEDICINE

## 2018-01-24 PROCEDURE — 0W9930Z DRAINAGE OF RIGHT PLEURAL CAVITY WITH DRAINAGE DEVICE, PERCUTANEOUS APPROACH: ICD-10-PCS | Performed by: INTERNAL MEDICINE

## 2018-01-24 PROCEDURE — 85025 COMPLETE CBC W/AUTO DIFF WBC: CPT | Performed by: INTERNAL MEDICINE

## 2018-01-24 PROCEDURE — 25010000002 VANCOMYCIN PER 500 MG: Performed by: INTERNAL MEDICINE

## 2018-01-24 PROCEDURE — 25010000002 HEPARIN (PORCINE) PER 1000 UNITS: Performed by: INTERNAL MEDICINE

## 2018-01-24 PROCEDURE — 86140 C-REACTIVE PROTEIN: CPT | Performed by: INTERNAL MEDICINE

## 2018-01-24 PROCEDURE — 82375 ASSAY CARBOXYHB QUANT: CPT | Performed by: INTERNAL MEDICINE

## 2018-01-24 PROCEDURE — 83735 ASSAY OF MAGNESIUM: CPT | Performed by: HOSPITALIST

## 2018-01-24 PROCEDURE — 25010000002 METHYLPREDNISOLONE PER 40 MG: Performed by: INTERNAL MEDICINE

## 2018-01-24 PROCEDURE — 99232 SBSQ HOSP IP/OBS MODERATE 35: CPT | Performed by: INTERNAL MEDICINE

## 2018-01-24 PROCEDURE — 75989 ABSCESS DRAINAGE UNDER X-RAY: CPT | Performed by: INTERNAL MEDICINE

## 2018-01-24 PROCEDURE — 80061 LIPID PANEL: CPT | Performed by: INTERNAL MEDICINE

## 2018-01-24 RX ORDER — POTASSIUM CHLORIDE 1.5 G/1.77G
40 POWDER, FOR SOLUTION ORAL AS NEEDED
Status: DISCONTINUED | OUTPATIENT
Start: 2018-01-24 | End: 2018-01-27 | Stop reason: HOSPADM

## 2018-01-24 RX ORDER — FENTANYL CITRATE 50 UG/ML
INJECTION, SOLUTION INTRAMUSCULAR; INTRAVENOUS
Status: COMPLETED
Start: 2018-01-24 | End: 2018-01-24

## 2018-01-24 RX ORDER — POTASSIUM CHLORIDE 1.5 G/1.77G
40 POWDER, FOR SOLUTION ORAL ONCE
Status: DISCONTINUED | OUTPATIENT
Start: 2018-01-24 | End: 2018-01-24

## 2018-01-24 RX ORDER — POTASSIUM CHLORIDE 7.45 MG/ML
10 INJECTION INTRAVENOUS
Status: DISCONTINUED | OUTPATIENT
Start: 2018-01-24 | End: 2018-01-27 | Stop reason: HOSPADM

## 2018-01-24 RX ORDER — POTASSIUM CHLORIDE 750 MG/1
40 CAPSULE, EXTENDED RELEASE ORAL AS NEEDED
Status: DISCONTINUED | OUTPATIENT
Start: 2018-01-24 | End: 2018-01-27 | Stop reason: HOSPADM

## 2018-01-24 RX ORDER — LIDOCAINE HYDROCHLORIDE 10 MG/ML
INJECTION, SOLUTION INFILTRATION; PERINEURAL
Status: DISCONTINUED
Start: 2018-01-24 | End: 2018-01-24 | Stop reason: WASHOUT

## 2018-01-24 RX ORDER — POTASSIUM CHLORIDE 1.5 G/1.77G
20 POWDER, FOR SOLUTION ORAL ONCE
Status: DISCONTINUED | OUTPATIENT
Start: 2018-01-24 | End: 2018-01-24

## 2018-01-24 RX ORDER — MAGNESIUM HYDROXIDE 1200 MG/15ML
LIQUID ORAL
Status: DISPENSED
Start: 2018-01-24 | End: 2018-01-24

## 2018-01-24 RX ORDER — FENTANYL CITRATE 50 UG/ML
100 INJECTION, SOLUTION INTRAMUSCULAR; INTRAVENOUS
Status: DISCONTINUED | OUTPATIENT
Start: 2018-01-24 | End: 2018-01-26

## 2018-01-24 RX ORDER — POTASSIUM CHLORIDE 20 MEQ/1
40 TABLET, EXTENDED RELEASE ORAL EVERY 4 HOURS
Status: COMPLETED | OUTPATIENT
Start: 2018-01-24 | End: 2018-01-24

## 2018-01-24 RX ORDER — MIDAZOLAM HYDROCHLORIDE 1 MG/ML
0.5 INJECTION INTRAMUSCULAR; INTRAVENOUS ONCE
Status: COMPLETED | OUTPATIENT
Start: 2018-01-24 | End: 2018-01-24

## 2018-01-24 RX ORDER — FENTANYL CITRATE 50 UG/ML
100 INJECTION, SOLUTION INTRAMUSCULAR; INTRAVENOUS ONCE
Status: COMPLETED | OUTPATIENT
Start: 2018-01-24 | End: 2018-01-24

## 2018-01-24 RX ORDER — MIDAZOLAM HYDROCHLORIDE 1 MG/ML
INJECTION INTRAMUSCULAR; INTRAVENOUS
Status: COMPLETED
Start: 2018-01-24 | End: 2018-01-24

## 2018-01-24 RX ADMIN — POTASSIUM PHOSPHATE, MONOBASIC AND POTASSIUM PHOSPHATE, DIBASIC 30 MMOL: 224; 236 INJECTION, SOLUTION INTRAVENOUS at 10:23

## 2018-01-24 RX ADMIN — ONDANSETRON 4 MG: 4 TABLET, FILM COATED ORAL at 17:19

## 2018-01-24 RX ADMIN — GUAIFENESIN 400 MG: 200 TABLET ORAL at 20:34

## 2018-01-24 RX ADMIN — VANCOMYCIN HYDROCHLORIDE 1000 MG: 5 INJECTION, POWDER, LYOPHILIZED, FOR SOLUTION INTRAVENOUS at 21:39

## 2018-01-24 RX ADMIN — AZTREONAM 2 G: 2 INJECTION, POWDER, FOR SOLUTION INTRAMUSCULAR; INTRAVENOUS at 15:00

## 2018-01-24 RX ADMIN — METHYLPREDNISOLONE SODIUM SUCCINATE 40 MG: 40 INJECTION, POWDER, FOR SOLUTION INTRAMUSCULAR; INTRAVENOUS at 09:13

## 2018-01-24 RX ADMIN — CHOLECALCIFEROL TAB 10 MCG (400 UNIT) 1000 UNITS: 10 TAB at 09:13

## 2018-01-24 RX ADMIN — DONEPEZIL HYDROCHLORIDE 10 MG: 5 TABLET, FILM COATED ORAL at 20:35

## 2018-01-24 RX ADMIN — Medication: at 20:34

## 2018-01-24 RX ADMIN — VANCOMYCIN HYDROCHLORIDE 1000 MG: 5 INJECTION, POWDER, LYOPHILIZED, FOR SOLUTION INTRAVENOUS at 04:04

## 2018-01-24 RX ADMIN — MIDAZOLAM HYDROCHLORIDE 0.5 MG: 1 INJECTION, SOLUTION INTRAMUSCULAR; INTRAVENOUS at 09:15

## 2018-01-24 RX ADMIN — ESCITALOPRAM 5 MG: 10 TABLET, FILM COATED ORAL at 09:14

## 2018-01-24 RX ADMIN — HYDROCODONE BITARTRATE AND ACETAMINOPHEN 1 TABLET: 5; 325 TABLET ORAL at 12:46

## 2018-01-24 RX ADMIN — IPRATROPIUM BROMIDE AND ALBUTEROL SULFATE 3 ML: .5; 3 SOLUTION RESPIRATORY (INHALATION) at 06:15

## 2018-01-24 RX ADMIN — IPRATROPIUM BROMIDE AND ALBUTEROL SULFATE 3 ML: .5; 3 SOLUTION RESPIRATORY (INHALATION) at 19:41

## 2018-01-24 RX ADMIN — HEPARIN SODIUM 5000 UNITS: 5000 INJECTION, SOLUTION INTRAVENOUS; SUBCUTANEOUS at 09:13

## 2018-01-24 RX ADMIN — METHYLPREDNISOLONE SODIUM SUCCINATE 40 MG: 40 INJECTION, POWDER, FOR SOLUTION INTRAMUSCULAR; INTRAVENOUS at 20:33

## 2018-01-24 RX ADMIN — POTASSIUM CHLORIDE 40 MEQ: 1500 TABLET, EXTENDED RELEASE ORAL at 15:00

## 2018-01-24 RX ADMIN — HEPARIN SODIUM 5000 UNITS: 5000 INJECTION, SOLUTION INTRAVENOUS; SUBCUTANEOUS at 20:33

## 2018-01-24 RX ADMIN — BUDESONIDE AND FORMOTEROL FUMARATE DIHYDRATE 2 PUFF: 160; 4.5 AEROSOL RESPIRATORY (INHALATION) at 06:16

## 2018-01-24 RX ADMIN — FENTANYL CITRATE 100 MCG: 50 INJECTION, SOLUTION INTRAMUSCULAR; INTRAVENOUS at 09:15

## 2018-01-24 RX ADMIN — GUAIFENESIN 400 MG: 200 TABLET ORAL at 09:15

## 2018-01-24 RX ADMIN — Medication 81 MG: at 09:15

## 2018-01-24 RX ADMIN — AZTREONAM 2 G: 2 INJECTION, POWDER, FOR SOLUTION INTRAMUSCULAR; INTRAVENOUS at 09:13

## 2018-01-24 RX ADMIN — AMLODIPINE BESYLATE 5 MG: 5 TABLET ORAL at 09:14

## 2018-01-24 RX ADMIN — IPRATROPIUM BROMIDE AND ALBUTEROL SULFATE 3 ML: .5; 3 SOLUTION RESPIRATORY (INHALATION) at 00:30

## 2018-01-24 RX ADMIN — MIDAZOLAM HYDROCHLORIDE 0.5 MG: 1 INJECTION INTRAMUSCULAR; INTRAVENOUS at 09:15

## 2018-01-24 RX ADMIN — KETOTIFEN FUMARATE 1 DROP: 0.35 SOLUTION/ DROPS OPHTHALMIC at 09:16

## 2018-01-24 RX ADMIN — MEMANTINE HYDROCHLORIDE 10 MG: 10 TABLET, FILM COATED ORAL at 09:14

## 2018-01-24 RX ADMIN — ATORVASTATIN CALCIUM 20 MG: 20 TABLET, FILM COATED ORAL at 20:34

## 2018-01-24 RX ADMIN — AZTREONAM 2 G: 2 INJECTION, POWDER, FOR SOLUTION INTRAMUSCULAR; INTRAVENOUS at 00:32

## 2018-01-24 RX ADMIN — Medication 1000 MCG: at 09:16

## 2018-01-24 RX ADMIN — PANTOPRAZOLE SODIUM 40 MG: 40 TABLET, DELAYED RELEASE ORAL at 20:34

## 2018-01-24 RX ADMIN — CETIRIZINE HYDROCHLORIDE 5 MG: 10 TABLET ORAL at 09:14

## 2018-01-24 RX ADMIN — Medication: at 17:18

## 2018-01-24 RX ADMIN — METRONIDAZOLE 500 MG: 500 INJECTION, SOLUTION INTRAVENOUS at 15:00

## 2018-01-24 RX ADMIN — METRONIDAZOLE 500 MG: 500 INJECTION, SOLUTION INTRAVENOUS at 04:02

## 2018-01-24 RX ADMIN — POTASSIUM CHLORIDE 40 MEQ: 1500 TABLET, EXTENDED RELEASE ORAL at 12:37

## 2018-01-24 RX ADMIN — FENTANYL CITRATE 100 MCG: 50 INJECTION INTRAMUSCULAR; INTRAVENOUS at 09:15

## 2018-01-24 RX ADMIN — FERROUS SULFATE TAB 325 MG (65 MG ELEMENTAL FE) 325 MG: 325 (65 FE) TAB at 09:15

## 2018-01-24 RX ADMIN — PANTOPRAZOLE SODIUM 40 MG: 40 TABLET, DELAYED RELEASE ORAL at 09:15

## 2018-01-24 RX ADMIN — HYDROCODONE BITARTRATE AND ACETAMINOPHEN 1 TABLET: 5; 325 TABLET ORAL at 00:32

## 2018-01-24 RX ADMIN — MEMANTINE HYDROCHLORIDE 10 MG: 10 TABLET, FILM COATED ORAL at 20:35

## 2018-01-24 RX ADMIN — Medication 1 CAPSULE: at 09:13

## 2018-01-24 RX ADMIN — BUSPIRONE HYDROCHLORIDE 5 MG: 5 TABLET ORAL at 09:15

## 2018-01-24 RX ADMIN — Medication: at 09:16

## 2018-01-24 RX ADMIN — Medication: at 12:36

## 2018-01-24 RX ADMIN — FERROUS SULFATE TAB 325 MG (65 MG ELEMENTAL FE) 325 MG: 325 (65 FE) TAB at 17:15

## 2018-01-24 RX ADMIN — IPRATROPIUM BROMIDE AND ALBUTEROL SULFATE 3 ML: .5; 3 SOLUTION RESPIRATORY (INHALATION) at 12:22

## 2018-01-24 RX ADMIN — ONDANSETRON 4 MG: 4 TABLET, FILM COATED ORAL at 05:20

## 2018-01-24 RX ADMIN — BUDESONIDE AND FORMOTEROL FUMARATE DIHYDRATE 2 PUFF: 160; 4.5 AEROSOL RESPIRATORY (INHALATION) at 19:41

## 2018-01-24 RX ADMIN — KETOTIFEN FUMARATE 1 DROP: 0.35 SOLUTION/ DROPS OPHTHALMIC at 20:34

## 2018-01-24 RX ADMIN — BUSPIRONE HYDROCHLORIDE 5 MG: 5 TABLET ORAL at 20:34

## 2018-01-24 RX ADMIN — METRONIDAZOLE 500 MG: 500 INJECTION, SOLUTION INTRAVENOUS at 20:35

## 2018-01-24 NOTE — PROGRESS NOTES
Subjective     History:   Amy Do is a 82 y.o. female admitted on 1/22/2018 secondary to Pneumonia of left lower lobe due to infectious organism     Procedures:   1/22/18: Right subclavian central line placement in the ED    Patient seen and examined with ANTWAN Patel. Awake and alert. Currently reporting dyspnea. Reports nausea, abdominal pain and diarrhea. Denies CP. FiO2 increased this AM. CXR this AM reveals right-sided pneumothorax and pulm is planning to place a chest tube.     History taken from: patient, chart, and RN.      Objective     Vital Signs  Temp:  [97.7 °F (36.5 °C)-98.7 °F (37.1 °C)] 98.1 °F (36.7 °C)  Heart Rate:  [] 83  Resp:  [16-23] 18  BP: (116-175)/() 116/62    Intake/Output Summary (Last 24 hours) at 01/24/18 0921  Last data filed at 01/24/18 0913   Gross per 24 hour   Intake             1100 ml   Output             2800 ml   Net            -1700 ml         Physical Exam:  General:    Awake, alert, appears pale, chronically ill appearing   Heart:      Normal S1 and S2. Regular rate and rhythm. No significant murmur, rubs or gallops appreciated.   Lungs:     Respirations regular, even and unlabored. Decreased right-sided breath sounds. Left-sided rhonchi improved from yesterday.    Abdomen:   Soft. Mild generalized TTP, worse in RENÉE region. No guarding, rebound tenderness or  organomegaly noted. Bowel sounds present x 4.   Extremities:  No clubbing, cyanosis or edema noted. Moves UE and LE equally B/L.     Results Review:      Results from last 7 days  Lab Units 01/24/18  0059 01/23/18  0108 01/22/18  0931   WBC 10*3/mm3 5.05 5.75 6.21   HEMOGLOBIN g/dL 11.9* 11.7* 12.5   PLATELETS 10*3/mm3 212 207 204       Results from last 7 days  Lab Units 01/24/18  0059 01/23/18  0108 01/22/18  0931   SODIUM mmol/L 141 141 135   POTASSIUM mmol/L 3.3* 3.8 3.6   CHLORIDE mmol/L 109 111 105   CO2 mmol/L 26.0 23.8* 23.0*   BUN mg/dL 13 22* 22*   CREATININE mg/dL 0.54 0.79 1.17   CALCIUM mg/dL  8.3 8.4 8.7   GLUCOSE mg/dL 118* 99 106       Results from last 7 days  Lab Units 01/24/18  0059 01/22/18  0931   BILIRUBIN mg/dL 0.2 0.3   ALK PHOS U/L 63 68   AST (SGOT) U/L 22 23   ALT (SGPT) U/L 22 20       Results from last 7 days  Lab Units 01/24/18  0059 01/23/18  0658 01/23/18  0108   MAGNESIUM mg/dL 2.0 2.7* 1.4*           Results from last 7 days  Lab Units 01/23/18  0658 01/23/18  0108 01/22/18  1939   CK TOTAL U/L 229*  229* 264* 276*  276*   TROPONIN I ng/mL 0.098* 0.146* 0.207*   CK MB INDEX % 2.7 2.5 2.1       Imaging Results (last 24 hours)     Procedure Component Value Units Date/Time    FL Fiberoptic Endo (fees) [[id number]] Updated:  01/23/18 1034    XR Chest 1 View [[id number]] Collected:  01/23/18 1550     Updated:  01/23/18 1605    Narrative:       XR CHEST 1 VIEW-     CLINICAL INDICATION: Simple Sepsis triage protocol          COMPARISON: 10/25/2016.      TECHNIQUE: Single frontal view of the chest.     FINDINGS:     Right-sided central line tip appears to be in the region of the  cavoatrial junction.  Possible right basilar consolidation.  There is no evidence of an acute osseous abnormality.   There are no suspicious-appearing parenchymal soft tissue nodules.            Impression:       Equivocal right basilar consolidation.         This report was finalized on 1/23/2018 4:03 PM by Dr. Fabio Zheng MD.       XR Chest AP [[id number]] Collected:  01/24/18 0809     Updated:  01/24/18 0809    Narrative:       XR CHEST AP-     CLINICAL INDICATION: Left-sided pneumonia; J18.1-Lobar pneumonia,  unspecified organism; A41.9-Sepsis, unspecified organism; R41.82-Altered  mental status, unspecified; J96.01-Acute respiratory failure with  hypoxia; J96.02-Acute respiratory failure with hypercapnia.          COMPARISON: 01/22/2018      TECHNIQUE: Single frontal view of the chest.     FINDINGS:     Interval development of a large right-sided pneumothorax.  The cardiac silhouette is normal. The pulmonary  vasculature is  unremarkable.  There is no evidence of an acute osseous abnormality.   There are no suspicious-appearing parenchymal soft tissue nodules.            Impression:       Interval development of a large right-sided pneumothorax.     I have discussed this with Dr. Gonsalez.                   Medications:    amLODIPine 5 mg Oral Daily   aspirin 81 mg Oral Daily   atorvastatin 20 mg Oral Nightly   aztreonam 2 g Intravenous Q8H   budesonide-formoterol 2 puff Inhalation BID - RT   busPIRone 5 mg Oral Q12H   cetirizine 5 mg Oral Daily   cholecalciferol 1,000 Units Oral Daily   donepezil 10 mg Oral Nightly   escitalopram 5 mg Oral Daily   ferrous sulfate 325 mg Oral BID With Meals   guaiFENesin 400 mg Oral BID   heparin (porcine) 5,000 Units Subcutaneous Q12H   insulin aspart 0-7 Units Subcutaneous 4x Daily AC & at Bedtime   ipratropium-albuterol 3 mL Nebulization Q6H - RT   ketotifen 1 drop Both Eyes BID   magic barrier cream  Topical 4x Daily   memantine 10 mg Oral BID   methylPREDNISolone sodium succinate 40 mg Intravenous Q12H   metroNIDAZOLE 500 mg Intravenous Q8H   pantoprazole 40 mg Oral BID   potassium phosphate infusion greater than 15 mMoles 30 mmol Intravenous Once   Risaquad-2 1 capsule Oral Daily   sterile water      vancomycin 1,000 mg Intravenous Q18H   vitamin B-12 1,000 mcg Oral Daily       Pharmacy to dose vancomycin            Assessment/Plan   Severe sepsis: Likely 2/2 left-sided pneumonia. Currently hemodynamically stable. WBC is stable. Lactic acid is normal. CRP is elevated. C diff is negative. Cultures with NGTD. Currently on broad spectrum IV antibiotics including Vanc, Azactam and Flagyl. Cont to follow cultures and repeat labs in the AM.     Acute hypoxic and hypercapnic respiratory failure: Likely multifactorial 2/2 pneumonia, COPD exacerbation and possible oversedation with opioids. CXR this AM reveals right-sided pneumothorax which is likely now contributing as well. Remains on  HFNC. Cont to monitor closely in the CCU. Pulm input appreciated.     Left-sided pneumonia: Mycoplasma is negative. Legionella ordered but not yet collected. No evidence of aspiration on speech eval. Cont antibiotics as outlined above. Repeat CXR in the AM.     Acute exacerbation of COPD: Cont treatment as outlined above.     Metabolic encephalopathy: CT head negative for any acute abnormalities. Pt's mental status improved with Narcan in the ED. Appears overall improved. Cont treatment as outlined above and supportive treatment.     Right-sided pneumothorax: Pulm to place chest tube this AM. Repeat CXR following chest tube placement and again in the AM. Pulm input appreciated.     Indeterminate range troponin elevation: Possibly 2/2 above. Serial CE's have trended downward. Echo reveals an EF of 66-70%, grade I diastolic dysfunction, mild AR, mild AS, mild MR, mild MS, mild TR and moderately elevated RVSP. Cardiology to consider nuclear stress test once her respiratory status improves.  Monitor on telemetry.     Arthritis: Concern for medication misuse upon admission. PRN Norco added back now that her mental status has improved to avoid withdrawal.     Nausea, abdominal pain and diarrhea: Stool studies are negative. Cont PPI. Order CT abd/pelvis.     Electrolyte abnormalities: K+ and PO4 low this AM. Start electrolyte replacement protocols and repeat labs in the AM.     DVT PPX: SQ heparin    Discussed with Carlos Zheng and Iain.     Pt is at high risk 2/2 severe sepsis, resp failure, pneumonia, COPD exacerbation, pneumothorax, metabolic encephalopathy, concern for medication misuse and advanced age.       Aroldo Gonsalez DO  01/24/18  9:21 AM

## 2018-01-24 NOTE — PLAN OF CARE
Problem: Pneumonia (Adult)  Goal: Signs and Symptoms of Listed Potential Problems Will be Absent or Manageable (Pneumonia)  Outcome: Ongoing (interventions implemented as appropriate)   01/24/18 1538   Pneumonia   Problems Assessed (Pneumonia) all   Problems Present (Pneumonia) none       Problem: Fall Risk (Adult)  Goal: Identify Related Risk Factors and Signs and Symptoms  Outcome: Ongoing (interventions implemented as appropriate)    Goal: Absence of Falls  Outcome: Ongoing (interventions implemented as appropriate)   01/24/18 1538   Fall Risk (Adult)   Absence of Falls making progress toward outcome       Problem: Skin Integrity Impairment, Risk/Actual (Adult)  Goal: Identify Related Risk Factors and Signs and Symptoms  Outcome: Ongoing (interventions implemented as appropriate)    Goal: Skin Integrity/Wound Healing  Outcome: Ongoing (interventions implemented as appropriate)   01/24/18 1538   Skin Integrity Impairment, Risk/Actual (Adult)   Skin Integrity/Wound Healing making progress toward outcome       Problem: Pressure Ulcer (Adult)  Goal: Signs and Symptoms of Listed Potential Problems Will be Absent or Manageable (Pressure Ulcer)  Outcome: Ongoing (interventions implemented as appropriate)   01/24/18 1538   Pressure Ulcer   Problems Assessed (Pressure Ulcer) all   Problems Present (Pressure Ulcer) none       Problem: Patient Care Overview (Adult)  Goal: Plan of Care Review  Outcome: Ongoing (interventions implemented as appropriate)   01/24/18 1538   Coping/Psychosocial Response Interventions   Plan Of Care Reviewed With patient   Outcome Evaluation   Outcome Summary/Follow up Plan pt found to have a right-sided pneumo this AM, chest tube placed, will continue to monitor

## 2018-01-24 NOTE — PROGRESS NOTES
Pre-procedure Diagnoses:     Primary spontaneous pneumothorax        Post-procedure Diagnoses:    Primary spontaneous pneumothorax        Procedures:    CHEST TUBE INSERTION               THORACOSTOMY (CHEST TUBE) PLACEMENT- using ultrasound     Indication: Pneumothorax- right sided,     A time-out was completed verifying correct patient, procedure, site, positioning, and special equipment if applicable.  Consent was taken.      The patient was positioned appropriately for chest tube placement. The patient’s   Right  chest was prepped and draped in sterile fashion. 1% Lidocaine was used to anesthetize the surrounding skin area around the third and fourth rib.  The rib was identified with ultrasound. The catheter with the needle was introduced into the skin and then into the pleural space air bubbles were seen in the syringe with saline. The catheter was introduced and needle was removed.  The chest tube was sutured securely to the skin and a sterile dressing applied. A pleurevac was attached to the chest tube and a chest x-ray obtained.     Estimated Blood Loss: Minimal  The patient tolerated the procedure well and there were no complications.     Chest x-ray ordered.  And seen chest tube is in proper place      Dr. Kenroy Timmons

## 2018-01-24 NOTE — PLAN OF CARE
Problem: Pneumonia (Adult)  Goal: Signs and Symptoms of Listed Potential Problems Will be Absent or Manageable (Pneumonia)  Outcome: Ongoing (interventions implemented as appropriate)

## 2018-01-24 NOTE — PROGRESS NOTES
LOS: 2 days     Chief Complaint:  Pulmonology is following for respiratory failure     Subjective     Interval History:     Mrs. Do is doing well this morning, no acute distress noted on exam.     History taken from: patient chart RN    Review of Systems:   Review of Systems   Constitutional: Negative for chills, fatigue and fever.   HENT: Negative for congestion and rhinorrhea.    Eyes: Negative for photophobia and visual disturbance.   Respiratory: Negative for cough, shortness of breath and wheezing.    Cardiovascular: Negative for chest pain and leg swelling.   Gastrointestinal: Negative for abdominal distention and abdominal pain.   Endocrine: Negative for cold intolerance and heat intolerance.   Genitourinary: Negative for difficulty urinating.   Musculoskeletal: Negative for arthralgias, back pain and gait problem.   Skin: Negative for color change.   Allergic/Immunologic: Negative for environmental allergies.   Neurological: Negative for dizziness, weakness and light-headedness.   Psychiatric/Behavioral: Negative for agitation, behavioral problems and confusion.                     Objective     Vital Signs  Temp:  [97.7 °F (36.5 °C)-98.7 °F (37.1 °C)] 98.1 °F (36.7 °C)  Heart Rate:  [] 82  Resp:  [16-23] 20  BP: (115-162)/() 115/59  Body mass index is 22.3 kg/(m^2).    Intake/Output Summary (Last 24 hours) at 01/24/18 1028  Last data filed at 01/24/18 1023   Gross per 24 hour   Intake             1350 ml   Output             2800 ml   Net            -1450 ml     I/O this shift:  In: 350 [IV Piggyback:350]  Out: -     Physical Exam:  GENERAL APPEARANCE:  alert and cooperative, and appears to be in no acute distress. Very hard of hearing, elderly female.     HEAD: normocephalic.    EYES: PERRL    NECK: Neck supple.     CARDIAC: Normal S1 and S2. No S3, S4 or murmurs. Rhythm is regular. There is no peripheral edema, cyanosis or pallor. Extremities are warm and well perfused. Capillary refill is  less than 2 seconds. No carotid bruits.    Respiratory: Clear to auscultation     GI: Positive bowel sounds. Soft, nondistended, nontender.     Musculoskeletal: No significant deformity or joint abnormality. No edema. Peripheral pulses intact.     NEUROLOGICAL: Strength and sensation symmetric and intact throughout.     PSYCHIATRIC: The mental examination revealed the patient was oriented to person, place, and time.                 Results Review:                I reviewed the patient's new clinical results.  I reviewed the patient's new imaging results and agree with the interpretation.    Results from last 7 days  Lab Units 01/24/18 0059 01/23/18 0108 01/22/18  0931   WBC 10*3/mm3 5.05 5.75 6.21   HEMOGLOBIN g/dL 11.9* 11.7* 12.5   PLATELETS 10*3/mm3 212 207 204       Results from last 7 days  Lab Units 01/24/18 0059 01/23/18  0658 01/23/18 0108 01/22/18  0931   SODIUM mmol/L 141  --  141 135   POTASSIUM mmol/L 3.3*  --  3.8 3.6   CHLORIDE mmol/L 109  --  111 105   CO2 mmol/L 26.0  --  23.8* 23.0*   BUN mg/dL 13  --  22* 22*   CREATININE mg/dL 0.54  --  0.79 1.17   CALCIUM mg/dL 8.3  --  8.4 8.7   GLUCOSE mg/dL 118*  --  99 106   MAGNESIUM mg/dL 2.0 2.7* 1.4*  --      Lab Results   Component Value Date    INR 0.93 03/06/2017    INR <0.90 10/25/2016    PROTIME 10.3 03/06/2017    PROTIME 10.1 10/25/2016       Results from last 7 days  Lab Units 01/24/18 0059 01/22/18  0931   ALK PHOS U/L 63 68   BILIRUBIN mg/dL 0.2 0.3   ALT (SGPT) U/L 22 20   AST (SGOT) U/L 22 23       Results from last 7 days  Lab Units 01/24/18  0455   PH, ARTERIAL pH units 7.338*   PO2 ART mm Hg 58.6*   PCO2, ARTERIAL mm Hg 52.1*   HCO3 ART mmol/L 27.4*     Imaging Results (last 24 hours)     Procedure Component Value Units Date/Time    FL Fiberoptic Endo (fees) [[id number]] Updated:  01/23/18 1034    XR Chest 1 View [[id number]] Collected:  01/23/18 1550     Updated:  01/23/18 1605    Narrative:       XR CHEST 1 VIEW-     CLINICAL INDICATION:  Simple Sepsis triage protocol          COMPARISON: 10/25/2016.      TECHNIQUE: Single frontal view of the chest.     FINDINGS:     Right-sided central line tip appears to be in the region of the  cavoatrial junction.  Possible right basilar consolidation.  There is no evidence of an acute osseous abnormality.   There are no suspicious-appearing parenchymal soft tissue nodules.            Impression:       Equivocal right basilar consolidation.         This report was finalized on 1/23/2018 4:03 PM by Dr. Fabio Zheng MD.       XR Chest AP [[id number]] Collected:  01/24/18 0809     Updated:  01/24/18 0809    Narrative:       XR CHEST AP-     CLINICAL INDICATION: Left-sided pneumonia; J18.1-Lobar pneumonia,  unspecified organism; A41.9-Sepsis, unspecified organism; R41.82-Altered  mental status, unspecified; J96.01-Acute respiratory failure with  hypoxia; J96.02-Acute respiratory failure with hypercapnia.          COMPARISON: 01/22/2018      TECHNIQUE: Single frontal view of the chest.     FINDINGS:     Interval development of a large right-sided pneumothorax.  The cardiac silhouette is normal. The pulmonary vasculature is  unremarkable.  There is no evidence of an acute osseous abnormality.   There are no suspicious-appearing parenchymal soft tissue nodules.            Impression:       Interval development of a large right-sided pneumothorax.     I have discussed this with Dr. Gonsalez.           XR Chest 1 View [[id number]] Collected:  01/24/18 0856     Updated:  01/24/18 0928    Narrative:       XR CHEST 1 VW-     CLINICAL INDICATION: chest tube placement; J18.1-Lobar pneumonia,  unspecified organism; A41.9-Sepsis, unspecified organism; R41.82-Altered  mental status, unspecified; J96.01-Acute respiratory failure with  hypoxia; J96.02-Acute respiratory failure with hypercapnia          COMPARISON: 1/24/2018      TECHNIQUE: Single frontal view of the chest.     FINDINGS:     Small caliber right-sided thoracostomy tube  has been placed. There is  partial reexpansion of the right lung.  The cardiac silhouette is normal. The pulmonary vasculature is  unremarkable.  There is no evidence of an acute osseous abnormality.   There are no suspicious-appearing parenchymal soft tissue nodules.            Impression:       Persistent right-sided pneumothorax. Partial reexpansion. Post  right-sided thoracostomy tube.         This report was finalized on 1/24/2018 8:57 AM by Dr. Fabio Zheng MD.                Medication Review:   Scheduled Medications:    amLODIPine 5 mg Oral Daily   aspirin 81 mg Oral Daily   atorvastatin 20 mg Oral Nightly   aztreonam 2 g Intravenous Q8H   budesonide-formoterol 2 puff Inhalation BID - RT   busPIRone 5 mg Oral Q12H   cetirizine 5 mg Oral Daily   cholecalciferol 1,000 Units Oral Daily   donepezil 10 mg Oral Nightly   escitalopram 5 mg Oral Daily   ferrous sulfate 325 mg Oral BID With Meals   guaiFENesin 400 mg Oral BID   heparin (porcine) 5,000 Units Subcutaneous Q12H   insulin aspart 0-7 Units Subcutaneous 4x Daily AC & at Bedtime   ipratropium-albuterol 3 mL Nebulization Q6H - RT   ketotifen 1 drop Both Eyes BID   magic barrier cream  Topical 4x Daily   memantine 10 mg Oral BID   methylPREDNISolone sodium succinate 40 mg Intravenous Q12H   metroNIDAZOLE 500 mg Intravenous Q8H   pantoprazole 40 mg Oral BID   potassium phosphate infusion greater than 15 mMoles 30 mmol Intravenous Once   Risaquad-2 1 capsule Oral Daily   sterile water      vancomycin 1,000 mg Intravenous Q18H   vitamin B-12 1,000 mcg Oral Daily     Continuous infusions:    Pharmacy to dose vancomycin        Assessment/Plan     Neuro: awake, alert and oriented no concerns.       Respiratory Failure-hypercarbic and hypoxic: likely related to underlying lung disease, COPD, and pneumonia likely related to aspiration. Continue antibiotics. SLP evaluation reviewed, universal aspiration precautions.   Continue BiPap for shortness of breath and HS,  will use HFNC during the day, titrate Fi02 to maintain Sp02 >92-94%     Incentive spirometer 10 times an hour. Continue scheduled inhalants and nebulizer's.     Chest xray from this morning reviewed, right pneumothorax noted, will place chest tube.      Cardiac: HR 82, /59 continue continuous ECG and v/s monitoring, maintain MAP >65.     Renal: Creatinine .54 ,  continue strict I&O, electrolytes replaced accordingly.     Hypokalemia: level is 3.3, will give potassium 20 meq PO x1. Recheck level in Am.     Hypophosphatemia: level is 1.7, replaced by primary, recheck level in AM.      GI: continue protonix.      DVT prophylaxis: heparin SQ BID.      IV access: central line in right subclavian 1/22/18.      ID: WBC is 5.75, neutrophils are 88.3, she does have fever, continue vanco, azactam and flagyl. Continue to monitor lab trends and clinical changes. Influenza negative, blood cultures are negative currently.     Patient Active Problem List   Diagnosis Code   • Pneumonia of left lower lobe due to infectious organism J18.1       ROLLY Rocha  01/24/18  10:28 AM      Scribed for Dr. Timmons by Lalita Land, ROLLY.     I, Kenroy Timmons M.D. attest that the above note accurately reflects the work and decisions made  by me.  Patient was seen and evaluated by Dr. Timmons, including history of present illness, physical exam, assessment, and treatment plan.  The above note was reviewed and edited by Dr. Timmons.Critical Care time spent in direct patient care: 33 minutes (excluding procedure time, if applicable) including high complexity decision making to assess, manipulate, and support vital organ system failure in this individual who has impairment of one or more vital organ systems such that there is a high probability of imminent or life threatening deterioration in the patient’s condition.

## 2018-01-24 NOTE — SIGNIFICANT NOTE
01/24/18 1549   Rehab Treatment   Discipline physical therapist   Treatment Not Performed patient unavailable for treatment  (Pt. received chest tube this date, nursing asked to  hold treatment this date.)

## 2018-01-24 NOTE — PROGRESS NOTES
Discharge Planning Assessment   [place]     Patient Name: Amy Do  MRN: [id number]  Today's Date: 1/24/2018    Admit Date: 1/22/2018          Discharge Plan       01/24/18 1421    Case Management/Social Work Plan    Plan Pt is a resident of [facility] and she has a 12 days left on her 14 day bed hold.  SS will continue to follow.     Patient/Family In Agreement With Plan yes        Discharge Placement     Facility/Agency Request Status Selected? Address Phone Number Fax Number    [facility] Pending - No Request Sent     [address] [place] [address] [phone]        Expected Discharge Date and Time     Expected Discharge Date Expected Discharge Time    Jan 27, 2018         Marta Lima

## 2018-01-24 NOTE — PROGRESS NOTES
THC Physician - Brief Progress Note  PERMANENT  01/24/2018 05:15    Advanced ICU Care  [facility] - CCU - 10 - [place], [place] ([facility])    TRAM WATERS    Date of Service 01/24/2018 05:15    HPI/Events of Note Increase to 50 L/m and FiO2 to 60%      Interventions Major-Hypoxemia - evaluation and management, Respiratory failure - evaluation and management        Electronically Signed by: Finn Kiser) on 1/24/2018 5:15 AM

## 2018-01-24 NOTE — PROGRESS NOTES
LOS: 2 days   Patient Care Team:  Hank Cabral MD as PCP - General  Hank Cabral MD as PCP - Family Medicine      Subjective     Admission information:    Ms. Do is a pleasant 82-year-old  female with no history of known coronary artery disease, was admitted on 1/20/2018 after being transferred from a local nursing home with altered mental status.  She was found to be in acute hypoxic/hypercapnic respiratory failure along with left-sided pneumonia and exacerbation of COPD.she is also noted to have mild elevation of troponin up to 0.207 and trending down.  On further questioning Ms. Do gives a history of having had recent left-sided chest pains which she describes as tightness.there are moderate intensity.  There is some ascitic shortness of breath but no sweating.  She currently denies any chest pain or discomfort.  Her EKG revealed normal sinus rhythm with no significant ischemic changes of myocardial ischemia or infarction.  She denies any previous history of known coronary artery disease or any other heart problems.    Interval History:     According to the patient she is doing better today.  She complains of some abdominal pain but denies any chest pain, shortness breath or palpitations.    History taken from: patient chart family    Vital Signs  Temp:  [97.7 °F (36.5 °C)-98.5 °F (36.9 °C)] 98.2 °F (36.8 °C)  Heart Rate:  [] 83  Resp:  [16-23] 18  BP: (110-159)/(54-92) 110/63    Physical Exam:     Physical Exam   Constitutional: She is oriented to person, place, and time. She appears well-developed and well-nourished.   Elderly white female lying in bed in high flow nasal cannula.   HENT:   Mouth/Throat: Oropharynx is clear and moist.   Eyes: EOM are normal. Pupils are equal, round, and reactive to light.   Neck: Neck supple. No JVD present. No tracheal deviation present. No thyromegaly present.   Cardiovascular: Normal rate, regular rhythm, S1 normal and S2 normal.  Exam reveals no  gallop and no friction rub.    No murmur heard.  Pulmonary/Chest: Effort normal. No respiratory distress. She has no wheezes. She has rales.   Abdominal: Soft. Bowel sounds are normal. She exhibits no mass. There is tenderness.   Musculoskeletal: Normal range of motion. She exhibits no edema.   Lymphadenopathy:     She has no cervical adenopathy.   Neurological: She is alert and oriented to person, place, and time.   Skin: Skin is warm and dry. No rash noted.   Psychiatric: She has a normal mood and affect.       Results Review:     I reviewed the patient's new clinical results.  I reviewed the patient's new imaging results and agree with the interpretation.  I reviewed the patient's other test results and agree with the interpretation  I personally viewed and interpreted the patient's EKG/Telemetry data    Medication:  Scheduled Meds:  amLODIPine 5 mg Oral Daily   aspirin 81 mg Oral Daily   atorvastatin 20 mg Oral Nightly   aztreonam 2 g Intravenous Q8H   budesonide-formoterol 2 puff Inhalation BID - RT   busPIRone 5 mg Oral Q12H   cetirizine 5 mg Oral Daily   cholecalciferol 1,000 Units Oral Daily   donepezil 10 mg Oral Nightly   escitalopram 5 mg Oral Daily   ferrous sulfate 325 mg Oral BID With Meals   guaiFENesin 400 mg Oral BID   heparin (porcine) 5,000 Units Subcutaneous Q12H   insulin aspart 0-7 Units Subcutaneous 4x Daily AC & at Bedtime   ipratropium-albuterol 3 mL Nebulization Q6H - RT   ketotifen 1 drop Both Eyes BID   magic barrier cream  Topical 4x Daily   memantine 10 mg Oral BID   methylPREDNISolone sodium succinate 40 mg Intravenous Q12H   metroNIDAZOLE 500 mg Intravenous Q8H   pantoprazole 40 mg Oral BID   potassium phosphate infusion greater than 15 mMoles 30 mmol Intravenous Once   Risaquad-2 1 capsule Oral Daily   sterile water      vancomycin 1,000 mg Intravenous Q18H   vitamin B-12 1,000 mcg Oral Daily     Continuous Infusions:  Pharmacy to dose vancomycin      PRN Meds:.bisacodyl  •   carboxymethylcellulose  •  dextrose  •  dextrose  •  dextrose  •  dextrose  •  docusate sodium  •  fentaNYL citrate (PF)  •  glucagon (human recombinant)  •  glucagon (human recombinant)  •  HYDROcodone-acetaminophen  •  lactulose  •  magnesium sulfate **OR** magnesium sulfate in D5W 1g/100mL (PREMIX) **OR** magnesium sulfate  •  nitroglycerin  •  ondansetron  •  Pharmacy to dose vancomycin  •  polyethylene glycol  •  potassium chloride **OR** potassium chloride **OR** potassium chloride  •  potassium phosphate infusion greater than 15 mMoles **OR** potassium phosphate infusion greater than 15 mMoles **OR** potassium phosphate **OR** sodium phosphate IVPB **OR** sodium phosphate IVPB **OR** sodium phosphate IVPB  •  sodium chloride  •  sodium chloride    Telemetry: Sinus rhythm in the 80s to 90s.      Assessment/Plan     1. Indeterminate range troponin which is probably due to sepsis from pneumonia.  2. No history of known coronary artery disease.  3. Acute hypoxic/hypercapnic respiratory failure secondary to exacerbation of COPD from pneumonia.  4. Severe sepsis secondary to pneumonia.     Recommendations:     1.  Troponin elevation: Patient with mild troponin elevation is likely related to sepsis.  However occult ischemia cannot be ruled out.  She'll benefit from a stress test once her general condition improves.  At this point would continue an aspirin and statin.  The patient's echocardiogram showed a normal ejection fraction and no wall motion abnormalities.  She is chest pain-free and her EKG shows no ST-T changes concerning for acute ischemia.    Disposition: The patient appears stable from a cardiology standpoint.  At this point we'll continue to see as needed.  I appreciate the opportunity to participate in this patient's cardiac vascular care.    Sundar Bueno MD  01/24/18  3:28 PM    Dragon disclaimer:  Much of this encounter note is an electronic transcription/translation of spoken  language to printed text. The electronic translation of spoken language may permit erroneous, or at times, nonsensical words or phrases to be inadvertently transcribed; Although I have reviewed the note for such errors, some may still exist.

## 2018-01-24 NOTE — PLAN OF CARE
Problem: Pressure Ulcer (Adult)  Goal: Signs and Symptoms of Listed Potential Problems Will be Absent or Manageable (Pressure Ulcer)  Outcome: Ongoing (interventions implemented as appropriate)      Problem: Patient Care Overview (Adult)  Goal: Plan of Care Review  Outcome: Ongoing (interventions implemented as appropriate)    Goal: Adult Individualization and Mutuality  Outcome: Ongoing (interventions implemented as appropriate)    Goal: Discharge Needs Assessment  Outcome: Ongoing (interventions implemented as appropriate)

## 2018-01-25 ENCOUNTER — APPOINTMENT (OUTPATIENT)
Dept: GENERAL RADIOLOGY | Facility: HOSPITAL | Age: 83
End: 2018-01-25

## 2018-01-25 PROBLEM — T85.528A DISLODGED GASTROSTOMY TUBE: Status: ACTIVE | Noted: 2018-01-22

## 2018-01-25 LAB
A-A DO2: 231.2 MMHG (ref 0–300)
ALBUMIN SERPL-MCNC: 3.2 G/DL (ref 3.4–4.8)
ALBUMIN/GLOB SERPL: 1.4 G/DL (ref 1.5–2.5)
ALP SERPL-CCNC: 54 U/L (ref 35–104)
ALT SERPL W P-5'-P-CCNC: 19 U/L (ref 10–36)
ANION GAP SERPL CALCULATED.3IONS-SCNC: 4.8 MMOL/L (ref 3.6–11.2)
ARTERIAL PATENCY WRIST A: POSITIVE
AST SERPL-CCNC: 20 U/L (ref 10–30)
ATMOSPHERIC PRESS: 729 MMHG
BASE EXCESS BLDA CALC-SCNC: 3.6 MMOL/L
BASOPHILS # BLD AUTO: 0 10*3/MM3 (ref 0–0.3)
BASOPHILS NFR BLD AUTO: 0 % (ref 0–2)
BDY SITE: ABNORMAL
BILIRUB SERPL-MCNC: 0.2 MG/DL (ref 0.2–1.8)
BODY TEMPERATURE: 98.6 C
BUN BLD-MCNC: 14 MG/DL (ref 7–21)
BUN/CREAT SERPL: 23.7 (ref 7–25)
CALCIUM SPEC-SCNC: 8.8 MG/DL (ref 7.7–10)
CHLORIDE SERPL-SCNC: 109 MMOL/L (ref 99–112)
CO2 SERPL-SCNC: 29.2 MMOL/L (ref 24.3–31.9)
COHGB MFR BLD: 1.6 % (ref 0–5)
CREAT BLD-MCNC: 0.59 MG/DL (ref 0.43–1.29)
CRP SERPL-MCNC: 3.28 MG/DL (ref 0–0.99)
DEPRECATED RDW RBC AUTO: 45.7 FL (ref 37–54)
EOSINOPHIL # BLD AUTO: 0 10*3/MM3 (ref 0–0.7)
EOSINOPHIL NFR BLD AUTO: 0 % (ref 0–7)
ERYTHROCYTE [DISTWIDTH] IN BLOOD BY AUTOMATED COUNT: 14.3 % (ref 11.5–14.5)
GFR SERPL CREATININE-BSD FRML MDRD: 98 ML/MIN/1.73
GLOBULIN UR ELPH-MCNC: 2.3 GM/DL
GLUCOSE BLD-MCNC: 109 MG/DL (ref 70–110)
GLUCOSE BLDC GLUCOMTR-MCNC: 104 MG/DL (ref 70–130)
GLUCOSE BLDC GLUCOMTR-MCNC: 135 MG/DL (ref 70–130)
GLUCOSE BLDC GLUCOMTR-MCNC: 166 MG/DL (ref 70–130)
GLUCOSE BLDC GLUCOMTR-MCNC: 81 MG/DL (ref 70–130)
GLUCOSE BLDC GLUCOMTR-MCNC: 91 MG/DL (ref 70–130)
HCO3 BLDA-SCNC: 28.1 MMOL/L (ref 22–26)
HCT VFR BLD AUTO: 38.3 % (ref 37–47)
HCT VFR BLD CALC: 37 % (ref 37–47)
HGB BLD-MCNC: 12 G/DL (ref 12–16)
HGB BLDA-MCNC: 12.5 G/DL (ref 12–16)
HOROWITZ INDEX BLD+IHG-RTO: 51 %
IMM GRANULOCYTES # BLD: 0.02 10*3/MM3 (ref 0–0.03)
IMM GRANULOCYTES NFR BLD: 0.3 % (ref 0–0.5)
L PNEUMO1 AG UR QL IA: NEGATIVE
LYMPHOCYTES # BLD AUTO: 0.55 10*3/MM3 (ref 1–3)
LYMPHOCYTES NFR BLD AUTO: 8.6 % (ref 16–46)
MAGNESIUM SERPL-MCNC: 1.9 MG/DL (ref 1.7–2.6)
MCH RBC QN AUTO: 28.3 PG (ref 27–33)
MCHC RBC AUTO-ENTMCNC: 31.3 G/DL (ref 33–37)
MCV RBC AUTO: 90.3 FL (ref 80–94)
METHGB BLD QL: 0.3 % (ref 0–3)
MODALITY: ABNORMAL
MONOCYTES # BLD AUTO: 0.47 10*3/MM3 (ref 0.1–0.9)
MONOCYTES NFR BLD AUTO: 7.3 % (ref 0–12)
NEUTROPHILS # BLD AUTO: 5.38 10*3/MM3 (ref 1.4–6.5)
NEUTROPHILS NFR BLD AUTO: 83.8 % (ref 40–75)
OSMOLALITY SERPL CALC.SUM OF ELEC: 286 MOSM/KG (ref 273–305)
OXYHGB MFR BLDV: 92.7 % (ref 85–100)
PCO2 BLDA: 42 MM HG (ref 35–45)
PH BLDA: 7.44 PH UNITS (ref 7.35–7.45)
PHOSPHATE SERPL-MCNC: 2 MG/DL (ref 2.7–4.5)
PLATELET # BLD AUTO: 233 10*3/MM3 (ref 130–400)
PMV BLD AUTO: 8.8 FL (ref 6–10)
PO2 BLDA: 69.5 MM HG (ref 80–100)
POTASSIUM BLD-SCNC: 4.2 MMOL/L (ref 3.5–5.3)
PROT SERPL-MCNC: 5.5 G/DL (ref 6–8)
RBC # BLD AUTO: 4.24 10*6/MM3 (ref 4.2–5.4)
SAO2 % BLDCOA: 94.5 % (ref 90–100)
SODIUM BLD-SCNC: 143 MMOL/L (ref 135–153)
VANCOMYCIN TROUGH SERPL-MCNC: 14.4 MCG/ML (ref 5–15)
WBC NRBC COR # BLD: 6.42 10*3/MM3 (ref 4.5–12.5)

## 2018-01-25 PROCEDURE — 94799 UNLISTED PULMONARY SVC/PX: CPT

## 2018-01-25 PROCEDURE — 63710000001 INSULIN ASPART PER 5 UNITS: Performed by: INTERNAL MEDICINE

## 2018-01-25 PROCEDURE — 99222 1ST HOSP IP/OBS MODERATE 55: CPT | Performed by: PHYSICIAN ASSISTANT

## 2018-01-25 PROCEDURE — 99233 SBSQ HOSP IP/OBS HIGH 50: CPT | Performed by: INTERNAL MEDICINE

## 2018-01-25 PROCEDURE — 94660 CPAP INITIATION&MGMT: CPT

## 2018-01-25 PROCEDURE — 80053 COMPREHEN METABOLIC PANEL: CPT | Performed by: INTERNAL MEDICINE

## 2018-01-25 PROCEDURE — 80202 ASSAY OF VANCOMYCIN: CPT | Performed by: INTERNAL MEDICINE

## 2018-01-25 PROCEDURE — 25010000002 HEPARIN (PORCINE) PER 1000 UNITS: Performed by: INTERNAL MEDICINE

## 2018-01-25 PROCEDURE — 85025 COMPLETE CBC W/AUTO DIFF WBC: CPT | Performed by: INTERNAL MEDICINE

## 2018-01-25 PROCEDURE — 82375 ASSAY CARBOXYHB QUANT: CPT | Performed by: INTERNAL MEDICINE

## 2018-01-25 PROCEDURE — 82805 BLOOD GASES W/O2 SATURATION: CPT | Performed by: INTERNAL MEDICINE

## 2018-01-25 PROCEDURE — S0260 H&P FOR SURGERY: HCPCS | Performed by: SURGERY

## 2018-01-25 PROCEDURE — 32551 INSERTION OF CHEST TUBE: CPT | Performed by: SURGERY

## 2018-01-25 PROCEDURE — 99291 CRITICAL CARE FIRST HOUR: CPT | Performed by: INTERNAL MEDICINE

## 2018-01-25 PROCEDURE — 84100 ASSAY OF PHOSPHORUS: CPT | Performed by: INTERNAL MEDICINE

## 2018-01-25 PROCEDURE — 97530 THERAPEUTIC ACTIVITIES: CPT

## 2018-01-25 PROCEDURE — 71045 X-RAY EXAM CHEST 1 VIEW: CPT

## 2018-01-25 PROCEDURE — 86140 C-REACTIVE PROTEIN: CPT | Performed by: INTERNAL MEDICINE

## 2018-01-25 PROCEDURE — 82962 GLUCOSE BLOOD TEST: CPT

## 2018-01-25 PROCEDURE — 97110 THERAPEUTIC EXERCISES: CPT

## 2018-01-25 PROCEDURE — 25010000002 METHYLPREDNISOLONE PER 40 MG: Performed by: INTERNAL MEDICINE

## 2018-01-25 PROCEDURE — 0W9930Z DRAINAGE OF RIGHT PLEURAL CAVITY WITH DRAINAGE DEVICE, PERCUTANEOUS APPROACH: ICD-10-PCS | Performed by: INTERNAL MEDICINE

## 2018-01-25 PROCEDURE — 83735 ASSAY OF MAGNESIUM: CPT | Performed by: INTERNAL MEDICINE

## 2018-01-25 PROCEDURE — 25010000002 VANCOMYCIN PER 500 MG: Performed by: INTERNAL MEDICINE

## 2018-01-25 PROCEDURE — 71045 X-RAY EXAM CHEST 1 VIEW: CPT | Performed by: RADIOLOGY

## 2018-01-25 PROCEDURE — 87899 AGENT NOS ASSAY W/OPTIC: CPT | Performed by: INTERNAL MEDICINE

## 2018-01-25 PROCEDURE — 83050 HGB METHEMOGLOBIN QUAN: CPT | Performed by: INTERNAL MEDICINE

## 2018-01-25 PROCEDURE — 36600 WITHDRAWAL OF ARTERIAL BLOOD: CPT | Performed by: INTERNAL MEDICINE

## 2018-01-25 RX ORDER — METHYLPREDNISOLONE SODIUM SUCCINATE 40 MG/ML
20 INJECTION, POWDER, LYOPHILIZED, FOR SOLUTION INTRAMUSCULAR; INTRAVENOUS EVERY 12 HOURS
Status: DISCONTINUED | OUTPATIENT
Start: 2018-01-25 | End: 2018-01-26

## 2018-01-25 RX ORDER — LIDOCAINE HYDROCHLORIDE 10 MG/ML
30 INJECTION, SOLUTION INFILTRATION; PERINEURAL ONCE
Status: COMPLETED | OUTPATIENT
Start: 2018-01-25 | End: 2018-01-25

## 2018-01-25 RX ORDER — LIDOCAINE HYDROCHLORIDE 10 MG/ML
INJECTION, SOLUTION INFILTRATION; PERINEURAL
Status: COMPLETED
Start: 2018-01-25 | End: 2018-01-25

## 2018-01-25 RX ADMIN — IPRATROPIUM BROMIDE AND ALBUTEROL SULFATE 3 ML: .5; 3 SOLUTION RESPIRATORY (INHALATION) at 07:16

## 2018-01-25 RX ADMIN — ATORVASTATIN CALCIUM 20 MG: 20 TABLET, FILM COATED ORAL at 21:19

## 2018-01-25 RX ADMIN — MEMANTINE HYDROCHLORIDE 10 MG: 10 TABLET, FILM COATED ORAL at 08:27

## 2018-01-25 RX ADMIN — LIDOCAINE HYDROCHLORIDE 30 ML: 10 INJECTION, SOLUTION INFILTRATION; PERINEURAL at 13:58

## 2018-01-25 RX ADMIN — AZTREONAM 2 G: 2 INJECTION, POWDER, FOR SOLUTION INTRAMUSCULAR; INTRAVENOUS at 01:00

## 2018-01-25 RX ADMIN — METHYLPREDNISOLONE SODIUM SUCCINATE 20 MG: 40 INJECTION, POWDER, FOR SOLUTION INTRAMUSCULAR; INTRAVENOUS at 21:19

## 2018-01-25 RX ADMIN — KETOTIFEN FUMARATE 1 DROP: 0.35 SOLUTION/ DROPS OPHTHALMIC at 21:20

## 2018-01-25 RX ADMIN — ONDANSETRON 4 MG: 4 TABLET, FILM COATED ORAL at 06:44

## 2018-01-25 RX ADMIN — AMLODIPINE BESYLATE 5 MG: 5 TABLET ORAL at 08:28

## 2018-01-25 RX ADMIN — BUDESONIDE AND FORMOTEROL FUMARATE DIHYDRATE 2 PUFF: 160; 4.5 AEROSOL RESPIRATORY (INHALATION) at 07:17

## 2018-01-25 RX ADMIN — Medication: at 13:55

## 2018-01-25 RX ADMIN — DONEPEZIL HYDROCHLORIDE 10 MG: 5 TABLET, FILM COATED ORAL at 21:19

## 2018-01-25 RX ADMIN — CHOLECALCIFEROL TAB 10 MCG (400 UNIT) 1000 UNITS: 10 TAB at 08:28

## 2018-01-25 RX ADMIN — INSULIN ASPART 2 UNITS: 100 INJECTION, SOLUTION INTRAVENOUS; SUBCUTANEOUS at 11:09

## 2018-01-25 RX ADMIN — GUAIFENESIN 400 MG: 200 TABLET ORAL at 08:27

## 2018-01-25 RX ADMIN — ESCITALOPRAM 5 MG: 10 TABLET, FILM COATED ORAL at 08:27

## 2018-01-25 RX ADMIN — IPRATROPIUM BROMIDE AND ALBUTEROL SULFATE 3 ML: .5; 3 SOLUTION RESPIRATORY (INHALATION) at 19:55

## 2018-01-25 RX ADMIN — MEMANTINE HYDROCHLORIDE 10 MG: 10 TABLET, FILM COATED ORAL at 21:19

## 2018-01-25 RX ADMIN — GUAIFENESIN 400 MG: 200 TABLET ORAL at 21:19

## 2018-01-25 RX ADMIN — Medication: at 08:30

## 2018-01-25 RX ADMIN — VANCOMYCIN HYDROCHLORIDE 1000 MG: 5 INJECTION, POWDER, LYOPHILIZED, FOR SOLUTION INTRAVENOUS at 17:01

## 2018-01-25 RX ADMIN — METHYLPREDNISOLONE SODIUM SUCCINATE 20 MG: 40 INJECTION, POWDER, FOR SOLUTION INTRAMUSCULAR; INTRAVENOUS at 08:26

## 2018-01-25 RX ADMIN — Medication 1 CAPSULE: at 08:27

## 2018-01-25 RX ADMIN — IPRATROPIUM BROMIDE AND ALBUTEROL SULFATE 3 ML: .5; 3 SOLUTION RESPIRATORY (INHALATION) at 00:32

## 2018-01-25 RX ADMIN — METRONIDAZOLE 500 MG: 500 INJECTION, SOLUTION INTRAVENOUS at 13:55

## 2018-01-25 RX ADMIN — BUDESONIDE AND FORMOTEROL FUMARATE DIHYDRATE 2 PUFF: 160; 4.5 AEROSOL RESPIRATORY (INHALATION) at 19:55

## 2018-01-25 RX ADMIN — FERROUS SULFATE TAB 325 MG (65 MG ELEMENTAL FE) 325 MG: 325 (65 FE) TAB at 08:28

## 2018-01-25 RX ADMIN — PANTOPRAZOLE SODIUM 40 MG: 40 TABLET, DELAYED RELEASE ORAL at 21:19

## 2018-01-25 RX ADMIN — CETIRIZINE HYDROCHLORIDE 5 MG: 10 TABLET ORAL at 08:28

## 2018-01-25 RX ADMIN — KETOTIFEN FUMARATE 1 DROP: 0.35 SOLUTION/ DROPS OPHTHALMIC at 08:30

## 2018-01-25 RX ADMIN — METRONIDAZOLE 500 MG: 500 INJECTION, SOLUTION INTRAVENOUS at 05:46

## 2018-01-25 RX ADMIN — FERROUS SULFATE TAB 325 MG (65 MG ELEMENTAL FE) 325 MG: 325 (65 FE) TAB at 17:10

## 2018-01-25 RX ADMIN — HEPARIN SODIUM 5000 UNITS: 5000 INJECTION, SOLUTION INTRAVENOUS; SUBCUTANEOUS at 21:20

## 2018-01-25 RX ADMIN — HYDROCODONE BITARTRATE AND ACETAMINOPHEN 1 TABLET: 5; 325 TABLET ORAL at 17:10

## 2018-01-25 RX ADMIN — BUSPIRONE HYDROCHLORIDE 5 MG: 5 TABLET ORAL at 21:19

## 2018-01-25 RX ADMIN — METRONIDAZOLE 500 MG: 500 INJECTION, SOLUTION INTRAVENOUS at 21:19

## 2018-01-25 RX ADMIN — Medication 1000 MCG: at 08:29

## 2018-01-25 RX ADMIN — Medication: at 17:01

## 2018-01-25 RX ADMIN — IPRATROPIUM BROMIDE AND ALBUTEROL SULFATE 3 ML: .5; 3 SOLUTION RESPIRATORY (INHALATION) at 13:48

## 2018-01-25 RX ADMIN — BUSPIRONE HYDROCHLORIDE 5 MG: 5 TABLET ORAL at 08:28

## 2018-01-25 RX ADMIN — PANTOPRAZOLE SODIUM 40 MG: 40 TABLET, DELAYED RELEASE ORAL at 08:27

## 2018-01-25 RX ADMIN — Medication: at 21:20

## 2018-01-25 NOTE — PLAN OF CARE
Problem: Pneumonia (Adult)  Goal: Signs and Symptoms of Listed Potential Problems Will be Absent or Manageable (Pneumonia)  Outcome: Ongoing (interventions implemented as appropriate)      Problem: Fall Risk (Adult)  Goal: Identify Related Risk Factors and Signs and Symptoms  Outcome: Ongoing (interventions implemented as appropriate)    Goal: Absence of Falls  Outcome: Ongoing (interventions implemented as appropriate)   01/25/18 1155   Fall Risk (Adult)   Absence of Falls making progress toward outcome       Problem: Skin Integrity Impairment, Risk/Actual (Adult)  Goal: Identify Related Risk Factors and Signs and Symptoms  Outcome: Ongoing (interventions implemented as appropriate)    Goal: Skin Integrity/Wound Healing  Outcome: Ongoing (interventions implemented as appropriate)   01/25/18 1155   Skin Integrity Impairment, Risk/Actual (Adult)   Skin Integrity/Wound Healing making progress toward outcome       Problem: Pressure Ulcer (Adult)  Goal: Signs and Symptoms of Listed Potential Problems Will be Absent or Manageable (Pressure Ulcer)  Outcome: Ongoing (interventions implemented as appropriate)   01/25/18 1155   Pressure Ulcer   Problems Assessed (Pressure Ulcer) all   Problems Present (Pressure Ulcer) none       Problem: Patient Care Overview (Adult)  Goal: Plan of Care Review  Outcome: Ongoing (interventions implemented as appropriate)

## 2018-01-25 NOTE — PROGRESS NOTES
Discharge Planning Assessment   [place]     Patient Name: Amy Do  MRN: [id number]  Today's Date: 1/25/2018    Admit Date: 1/22/2018       Discharge Plan       01/25/18 0914    Case Management/Social Work Plan    Plan SS recieved a consult for [facility].  SS faxed referral to [facility] at [phone].  SS spoke with Qian and she states they will contact SS if able to accept.  SS will follow.     Patient/Family In Agreement With Plan yes        Discharge Placement     Facility/Agency Request Status Selected? Address Phone Number Fax Number    [facility] Pending - No Request Sent     [address] [place] [address] [phone]        Expected Discharge Date and Time     Expected Discharge Date Expected Discharge Time    Jan 27, 2018         Marta Lima

## 2018-01-25 NOTE — PROGRESS NOTES
Discharge Planning Assessment  [facility]     Patient Name: Amy Do  MRN: [id number]  Today's Date: 1/25/2018    Admit Date: 1/22/2018             Discharge Plan       01/25/18 1351    Case Management/Social Work Plan    Plan SS spoke with pt's daughter regarding the [facility] consult and she is agreeable for the pt to be transferred to [facility] when bed is available.  SS contacted [facility] and left a message for Qian.  SS will continue to follow.          Discharge Placement     Facility/Agency Request Status Selected? Address Phone Number Fax Number    [facility] Pending - No Request Sent     [address] [address] [phone]        Expected Discharge Date and Time     Expected Discharge Date Expected Discharge Time    Jan 27, 2018                Marta Lima

## 2018-01-25 NOTE — PROCEDURES
Dx right pneumothorax    The right chest prepped and draped. Local injected and a small trocar chest tube placed without difficulty. The tube was sutured in with a silk suture. And the old tube removed.A dressing was applied.

## 2018-01-25 NOTE — PROGRESS NOTES
Subjective     History:   Amy Do is a 82 y.o. female admitted on 1/22/2018 secondary to Pneumonia of left lower lobe due to infectious organism     Procedures:   1/22/18: Right subclavian central line placement in the ED  1/24/18: Right-sided chest tube placement     Patient seen and examined with ANTWAN Patel. Awake and alert. Continues to report dyspnea. Reports cough with some sputum production. Reports persistent nausea, abdominal pain and diarrhea. Denies CP. Denies vomiting. Repeat CXR reveals worsening pneumothorax with partial retraction of chest tube. General surgery consulted for chest tube placement.     History taken from: patient, chart, and RN.      Objective     Vital Signs  Temp:  [98.2 °F (36.8 °C)-100.1 °F (37.8 °C)] 98.9 °F (37.2 °C)  Heart Rate:  [] 106  Resp:  [13-20] 16  BP: (110-161)/(54-91) 138/68    Intake/Output Summary (Last 24 hours) at 01/25/18 0855  Last data filed at 01/25/18 0550   Gross per 24 hour   Intake             1100 ml   Output             1525 ml   Net             -425 ml         Physical Exam:  General:    Awake, alert, in no acute distress, chronically ill appearing   Heart:      Normal S1 and S2. Regular rate and rhythm. No significant murmur, rubs or gallops appreciated.   Lungs:     Respirations regular, even and unlabored. Decreased right-sided breath sounds. Left-sided rhonchi overall improved. (+) right-sided chest tube    Abdomen:   Soft. Mild generalized TTP, worse in RENÉE region. No guarding, rebound tenderness or  organomegaly noted. Bowel sounds present x 4.   Extremities:  No clubbing, cyanosis or edema noted. Moves UE and LE equally B/L.     Results Review:      Results from last 7 days  Lab Units 01/25/18  0420 01/24/18  0059 01/23/18  0108 01/22/18  0931   WBC 10*3/mm3 6.42 5.05 5.75 6.21   HEMOGLOBIN g/dL 12.0 11.9* 11.7* 12.5   PLATELETS 10*3/mm3 233 212 207 204       Results from last 7 days  Lab Units 01/25/18  0420 01/24/18  0059 01/23/18  0108  01/22/18  0931   SODIUM mmol/L 143 141 141 135   POTASSIUM mmol/L 4.2 3.3* 3.8 3.6   CHLORIDE mmol/L 109 109 111 105   CO2 mmol/L 29.2 26.0 23.8* 23.0*   BUN mg/dL 14 13 22* 22*   CREATININE mg/dL 0.59 0.54 0.79 1.17   CALCIUM mg/dL 8.8 8.3 8.4 8.7   GLUCOSE mg/dL 109 118* 99 106       Results from last 7 days  Lab Units 01/25/18  0420 01/24/18  0059 01/22/18  0931   BILIRUBIN mg/dL 0.2 0.2 0.3   ALK PHOS U/L 54 63 68   AST (SGOT) U/L 20 22 23   ALT (SGPT) U/L 19 22 20       Results from last 7 days  Lab Units 01/25/18  0420 01/24/18  0059 01/23/18  0658 01/23/18  0108   MAGNESIUM mg/dL 1.9 2.0 2.7* 1.4*           Results from last 7 days  Lab Units 01/23/18  0658 01/23/18  0108 01/22/18  1939   CK TOTAL U/L 229*  229* 264* 276*  276*   TROPONIN I ng/mL 0.098* 0.146* 0.207*   CK MB INDEX % 2.7 2.5 2.1       Imaging Results (last 24 hours)     Procedure Component Value Units Date/Time    XR Chest 1 View [[id number]] Collected:  01/24/18 0856     Updated:  01/24/18 0928    Narrative:       XR CHEST 1 VW-     CLINICAL INDICATION: chest tube placement; J18.1-Lobar pneumonia,  unspecified organism; A41.9-Sepsis, unspecified organism; R41.82-Altered  mental status, unspecified; J96.01-Acute respiratory failure with  hypoxia; J96.02-Acute respiratory failure with hypercapnia          COMPARISON: 1/24/2018      TECHNIQUE: Single frontal view of the chest.     FINDINGS:     Small caliber right-sided thoracostomy tube has been placed. There is  partial reexpansion of the right lung.  The cardiac silhouette is normal. The pulmonary vasculature is  unremarkable.  There is no evidence of an acute osseous abnormality.   There are no suspicious-appearing parenchymal soft tissue nodules.            Impression:       Persistent right-sided pneumothorax. Partial reexpansion. Post  right-sided thoracostomy tube.         This report was finalized on 1/24/2018 8:57 AM by Dr. Fabio Zheng MD.       XR Chest 1 View [[id number]]  Collected:  01/24/18 1255     Updated:  01/24/18 1258    Narrative:       XR CHEST 1 VW-     CLINICAL INDICATION: pneumothorax; J18.1-Lobar pneumonia, unspecified  organism; A41.9-Sepsis, unspecified organism; R41.82-Altered mental  status, unspecified; J96.01-Acute respiratory failure with hypoxia;  J96.02-Acute respiratory failure with hypercapnia          COMPARISON: 1/24/2018      TECHNIQUE: Single frontal view of the chest.     FINDINGS:     Continued reexpansion. Now only a small right-sided pneumothorax is  present  The cardiac silhouette is normal. The pulmonary vasculature is  unremarkable.  There is no evidence of an acute osseous abnormality.   There are no suspicious-appearing parenchymal soft tissue nodules.            Impression:       Small residual right apical pneumothorax         This report was finalized on 1/24/2018 12:56 PM by Dr. Fabio Zheng MD.       XR Chest AP [[id number]] Collected:  01/24/18 0809     Updated:  01/24/18 1411    Narrative:       XR CHEST AP-     CLINICAL INDICATION: Left-sided pneumonia; J18.1-Lobar pneumonia,  unspecified organism; A41.9-Sepsis, unspecified organism; R41.82-Altered  mental status, unspecified; J96.01-Acute respiratory failure with  hypoxia; J96.02-Acute respiratory failure with hypercapnia.          COMPARISON: 01/22/2018      TECHNIQUE: Single frontal view of the chest.     FINDINGS:     Interval development of a large right-sided pneumothorax.  The cardiac silhouette is normal. The pulmonary vasculature is  unremarkable.  There is no evidence of an acute osseous abnormality.   There are no suspicious-appearing parenchymal soft tissue nodules.            Impression:       Interval development of a large right-sided pneumothorax.     I have discussed this with Dr. Gonsalez.         This report was finalized on 1/24/2018 2:09 PM by Dr. Fabio Zheng MD.       XR Chest AP [[id number]] Collected:  01/25/18 0726     Updated:  01/25/18 0729    Narrative:       XR  CHEST AP-     CLINICAL INDICATION: Pneumothorax; J18.1-Lobar pneumonia, unspecified  organism; A41.9-Sepsis, unspecified organism; R41.82-Altered mental  status, unspecified; J96.01-Acute respiratory failure with hypoxia;  J96.02-Acute respiratory failure with hypercapnia          COMPARISON: 1/24/2018      TECHNIQUE: Single frontal view of the chest.     FINDINGS:     The right thoracostomy tube appears to have been partially retracted.  There is right-sided pneumothorax, larger than on the previous exam.  The cardiac silhouette is normal. The pulmonary vasculature is  unremarkable.  There is no evidence of an acute osseous abnormality.   There are no suspicious-appearing parenchymal soft tissue nodules.            Impression:       It appears that the right thoracostomy tube has been partially retracted  and the pneumothorax has become larger since the prior study.         This report was finalized on 1/25/2018 7:27 AM by Dr. Fabio Zheng MD.               Medications:    amLODIPine 5 mg Oral Daily   aspirin 81 mg Oral Daily   atorvastatin 20 mg Oral Nightly   budesonide-formoterol 2 puff Inhalation BID - RT   busPIRone 5 mg Oral Q12H   cetirizine 5 mg Oral Daily   cholecalciferol 1,000 Units Oral Daily   donepezil 10 mg Oral Nightly   escitalopram 5 mg Oral Daily   ferrous sulfate 325 mg Oral BID With Meals   guaiFENesin 400 mg Oral BID   heparin (porcine) 5,000 Units Subcutaneous Q12H   insulin aspart 0-7 Units Subcutaneous 4x Daily AC & at Bedtime   ipratropium-albuterol 3 mL Nebulization Q6H - RT   ketotifen 1 drop Both Eyes BID   magic barrier cream  Topical 4x Daily   memantine 10 mg Oral BID   methylPREDNISolone sodium succinate 20 mg Intravenous Q12H   metroNIDAZOLE 500 mg Intravenous Q8H   pantoprazole 40 mg Oral BID   Risaquad-2 1 capsule Oral Daily   vancomycin 1,000 mg Intravenous Q18H   vitamin B-12 1,000 mcg Oral Daily       Pharmacy to dose vancomycin            Assessment/Plan   Severe sepsis:  Likely 2/2 left-sided pneumonia. Currently hemodynamically stable. WBC is stable. Lactic acid is normal. CRP is elevated but improved today. C diff is negative. Cultures with NGTD. Currently on Vanc and Flagy. Pulm has D/C'd Azactam. Cont to follow cultures and repeat labs in the AM.     Acute hypoxic and hypercapnic respiratory failure: Likely multifactorial 2/2 pneumonia, COPD exacerbation and possible oversedation with opioids. Pneumothorax now likely contributing as well. Remains on HFNC. Cont to monitor closely in the CCU. Pulm input appreciated.     Left-sided pneumonia: Mycoplasma is negative. Legionella ordered but not yet collected. No evidence of aspiration on speech eval. Cont antibiotics as outlined above. Repeat CXR in the AM.     Acute exacerbation of COPD: Cont treatment as outlined above. Cont steroid taper.    Metabolic encephalopathy: CT head negative for any acute abnormalities. Pt's mental status improved with Narcan in the ED. Appears overall improved. Cont treatment as outlined above and supportive treatment.     Right-sided pneumothorax: Chest tube placed yesterday with improvement in pneumothorax. However, repeat CXR this AM reveals worsening pneumothorax with partial retraction of the chest tube. Pulm has consulted general surgery for large bore chest tube placement. Pulm input appreciated.     Indeterminate range troponin elevation: Possibly 2/2 above. Serial CE's have trended downward. Echo reveals an EF of 66-70%, grade I diastolic dysfunction, mild AR, mild AS, mild MR, mild MS, mild TR and moderately elevated RVSP. Cardiology to consider nuclear stress test once her respiratory status improves.  Monitor on telemetry. Cardiology input appreciated.      Arthritis: Concern for medication misuse upon admission. PRN Norco added back now that her mental status has improved to avoid withdrawal.     Nausea, abdominal pain and diarrhea: Stool studies are negative. Cont PPI. CT abd/pelvis on 1/22  revealed sigmoid diverticuli but no evidence of diverticulitis. Pancreatic enzymes are normal. Consult GI for further evaluation.     Electrolyte abnormalities: K+ improved today. PO4 remains low. Cont electrolyte replacement protocols and repeat labs in the AM.     DVT PPX: SQ heparin    Discussed with Carlos Zheng and Iain.     Disposition: [facility] referral     Pt is at high risk 2/2 severe sepsis, resp failure, pneumonia, COPD exacerbation, pneumothorax, metabolic encephalopathy, concern for medication misuse and advanced age.       Aroldo Gonsalez DO  01/25/18  8:55 AM

## 2018-01-25 NOTE — PROGRESS NOTES
Kinetics :  Vancomycin  Day 4    The pre-dose vancomycin level was right at the goal range for this therapy.  Plan to continue the same for now and will monitor with you.

## 2018-01-25 NOTE — CONSULTS
Consults    Patient Care Team:  Hank Cabral MD as PCP - General  Hank Cabral MD as PCP - Family Medicine    Chief complaint: right pneumothorax    Subjective  She has COPD and pneumonia and had developed a right pneumothorax. A small right chest tube was placed yesterday and the lung was re expanding. It apparently got partially pulled out last night and the lung is collapsing again.     History of Present Illness    Review of Systems   Constitutional: Negative for activity change and appetite change.   HENT: Negative for dental problem.    Respiratory: Positive for chest tightness, shortness of breath and wheezing.    Cardiovascular: Negative for palpitations and leg swelling.   Gastrointestinal: Negative for abdominal distention and abdominal pain.   Musculoskeletal: Negative for back pain and joint swelling.   Skin: Negative for color change.        Past Medical History:   Diagnosis Date   • Allergic rhinitis    • Arthritis    • COPD (chronic obstructive pulmonary disease)    • Dementia    • Depression    • Diverticulitis    • Dysphagia    • GERD (gastroesophageal reflux disease)    • Hypertension    ,   Past Surgical History:   Procedure Laterality Date   • APPENDECTOMY     • CHOLECYSTECTOMY     • DENTAL PROCEDURE     • HEMORRHOIDECTOMY     • HYSTERECTOMY     • TONSILLECTOMY     ,   Family History   Problem Relation Age of Onset   • No Known Problems Mother    • No Known Problems Father    • No Known Problems Sister    • No Known Problems Brother    • No Known Problems Son    • No Known Problems Daughter    • No Known Problems Maternal Grandmother    • No Known Problems Maternal Grandfather    • No Known Problems Paternal Grandmother    • No Known Problems Paternal Grandfather    • No Known Problems Cousin    • Rheum arthritis Neg Hx    • Osteoarthritis Neg Hx    • Asthma Neg Hx    • Diabetes Neg Hx    • Heart failure Neg Hx    • Hyperlipidemia Neg Hx    • Hypertension Neg Hx    • Migraines Neg Hx    •  Rashes / Skin problems Neg Hx    • Seizures Neg Hx    • Stroke Neg Hx    • Thyroid disease Neg Hx    ,   Social History   Substance Use Topics   • Smoking status: Former Smoker     Types: Cigarettes   • Smokeless tobacco: None   • Alcohol use No   ,   Prescriptions Prior to Admission   Medication Sig Dispense Refill Last Dose   • acidophilus (FLORANEX) tablet tablet Take 1 tablet by mouth Daily.   1/22/2018 at am   • amLODIPine (NORVASC) 5 MG tablet Take 5 mg by mouth daily.   1/22/2018 at am   • aspirin 81 MG EC tablet Take 81 mg by mouth daily.   1/22/2018 at am   • azelastine (OPTIVAR) 0.05 % ophthalmic solution Administer 1 drop to both eyes 2 (Two) Times a Day.   1/22/2018 at am   • baclofen (LIORESAL) 10 MG tablet Take 10 mg by mouth Every Night.   1/21/2018 at pm   • Buprenorphine (BUTRANS) 15 MCG/HR patch weekly Place 1 patch on the skin Every 7 (Seven) Days.   1/16/2018 at am   • busPIRone (BUSPAR) 5 MG tablet Take 5 mg by mouth 2 (Two) Times a Day.   1/22/2018 at am   • celecoxib (CeleBREX) 200 MG capsule Take 200 mg by mouth Daily.   1/22/2018 at am   • cholecalciferol (VITAMIN D3) 1000 units tablet Take 1,000 Units by mouth Daily.   1/22/2018 at am   • Cyanocobalamin (VITAMIN B 12 PO) Take 1,000 mcg by mouth daily.   1/22/2018 at am   • donepezil (ARICEPT) 10 MG tablet Take 10 mg by mouth every night.   1/21/2018 at pm   • escitalopram (LEXAPRO) 5 MG tablet Take 5 mg by mouth Daily.   1/22/2018 at am   • ferrous sulfate 325 (65 FE) MG tablet Take 325 mg by mouth 2 (two) times a day.   1/22/2018 at am   • fluticasone-salmeterol (ADVAIR DISKUS) 250-50 MCG/DOSE DISKUS Inhale 1 puff 2 (Two) Times a Day.   1/22/2018 at am   • gabapentin (NEURONTIN) 600 MG tablet Take 600 mg by mouth Every 8 (Eight) Hours.   1/22/2018 at 0800   • guaiFENesin 200 MG tablet Take 400 mg by mouth 2 (two) times a day.   1/22/2018 at am   • HYDROcodone-acetaminophen (NORCO) 7.5-325 MG per tablet Take 1 tablet by mouth Every 12  (Twelve) Hours As Needed for Moderate Pain .   1/21/2018 at Unknown time   • leflunomide (ARAVA) 20 MG tablet Take 20 mg by mouth daily.   1/22/2018 at am   • linaclotide (LINZESS) 72 MCG capsule capsule Take 72 mcg by mouth 3 (Three) Times a Week.   1/22/2018 at am   • loperamide (IMODIUM) 2 MG capsule Take 2 mg by mouth Every 6 (Six) Hours As Needed for Diarrhea.   1/19/2018 at 1649   • loratadine (CLARITIN) 10 MG tablet Take 10 mg by mouth Every Night.   1/21/2018 at pm   • memantine (NAMENDA) 10 MG tablet Take 10 mg by mouth 2 (two) times a day.   1/22/2018 at am   • ondansetron (ZOFRAN) 4 MG tablet Take 4 mg by mouth Every 6 (Six) Hours As Needed for Nausea or Vomiting.   1/21/2018 at Unknown time   • oseltamivir (TAMIFLU) 75 MG capsule Take 75 mg by mouth Daily.   1/22/2018 at am   • pantoprazole (PROTONIX) 40 MG EC tablet Take 40 mg by mouth 2 (two) times a day.   1/22/2018 at am   • acetaminophen (TYLENOL) 500 MG tablet Take 500 mg by mouth Every 4 (Four) Hours As Needed for Mild Pain  or Fever.   Unknown at Unknown time   • ARTIFICIAL TEAR SOLUTION OP Apply 1 drop to eye Every 4 (Four) Hours As Needed (dryness).   Unknown at Unknown time   • bisacodyl (DULCOLAX) 10 MG suppository Insert 10 mg into the rectum Daily As Needed for Constipation.   Unknown at Unknown time   • docusate sodium (COLACE) 250 MG capsule Take 250 mg by mouth Every 12 (Twelve) Hours As Needed for Constipation.   Unknown at Unknown time   • lactulose (CHRONULAC) 10 GM/15ML solution Take 10 g by mouth Daily As Needed (constipation).   Unknown at Unknown time   • nitroglycerin (NITROSTAT) 0.4 MG SL tablet Place 0.4 mg under the tongue every 5 (five) minutes as needed for chest pain. Take no more than 3 doses in 15 minutes.   Unknown at Unknown time   • phenylephrine-mineral oil-petrolatum (HEMORRHOIDAL) 0.25-14-74.9 % ointment hemorrhoidal ointment Insert 1 application into the rectum Every 6 (Six) Hours As Needed (hemorrhoids).   Unknown  at Unknown time   • polyethylene glycol (MIRALAX) packet Take 17 g by mouth Daily As Needed (constipation).   Unknown at Unknown time   , Scheduled Meds:    amLODIPine 5 mg Oral Daily   aspirin 81 mg Oral Daily   atorvastatin 20 mg Oral Nightly   budesonide-formoterol 2 puff Inhalation BID - RT   busPIRone 5 mg Oral Q12H   cetirizine 5 mg Oral Daily   cholecalciferol 1,000 Units Oral Daily   donepezil 10 mg Oral Nightly   escitalopram 5 mg Oral Daily   ferrous sulfate 325 mg Oral BID With Meals   guaiFENesin 400 mg Oral BID   heparin (porcine) 5,000 Units Subcutaneous Q12H   insulin aspart 0-7 Units Subcutaneous 4x Daily AC & at Bedtime   ipratropium-albuterol 3 mL Nebulization Q6H - RT   ketotifen 1 drop Both Eyes BID   lidocaine      magic barrier cream  Topical 4x Daily   memantine 10 mg Oral BID   methylPREDNISolone sodium succinate 20 mg Intravenous Q12H   metroNIDAZOLE 500 mg Intravenous Q8H   pantoprazole 40 mg Oral BID   Risaquad-2 1 capsule Oral Daily   vancomycin 1,000 mg Intravenous Q18H   vitamin B-12 1,000 mcg Oral Daily   , Continuous Infusions:    Pharmacy to dose vancomycin    , PRN Meds:  bisacodyl  •  carboxymethylcellulose  •  dextrose  •  dextrose  •  dextrose  •  dextrose  •  docusate sodium  •  fentaNYL citrate (PF)  •  glucagon (human recombinant)  •  glucagon (human recombinant)  •  HYDROcodone-acetaminophen  •  lactulose  •  magnesium sulfate **OR** magnesium sulfate in D5W 1g/100mL (PREMIX) **OR** magnesium sulfate  •  nitroglycerin  •  ondansetron  •  Pharmacy to dose vancomycin  •  polyethylene glycol  •  potassium chloride **OR** potassium chloride **OR** potassium chloride  •  potassium phosphate infusion greater than 15 mMoles **OR** potassium phosphate infusion greater than 15 mMoles **OR** potassium phosphate **OR** sodium phosphate IVPB **OR** sodium phosphate IVPB **OR** sodium phosphate IVPB  •  sodium chloride  •  sodium chloride and Allergies:  Contrast dye; Penicillins; and  Sulfa antibiotics    Objective      Vital Signs  Temp:  [98.2 °F (36.8 °C)-100.1 °F (37.8 °C)] 98.9 °F (37.2 °C)  Heart Rate:  [] 98  Resp:  [13-18] 18  BP: (110-161)/(54-91) 113/58    Physical Exam   Neck: No thyromegaly present.   Pulmonary/Chest: No respiratory distress. She has wheezes.   Abdominal: There is no tenderness. No hernia.   Musculoskeletal: She exhibits no edema or deformity.       Results Review:    I reviewed the patient's new clinical results.  I reviewed the patient's new imaging results and agree with the interpretation.  I reviewed the patient's other test results and agree with the interpretation        Assessment/Plan  dislodged right chest tube. It will be replaced.     Principal Problem:    Dislodged gastrostomy tube  Active Problems:    Pneumonia of left lower lobe due to infectious organism      Assessment & Plan    I discussed the patients findings and my recommendations with patient, family, nursing staff and consulting provider    Grupo Becerril MD  01/25/18  11:47 AM    Time:

## 2018-01-25 NOTE — PLAN OF CARE
Problem: Skin Integrity Impairment, Risk/Actual (Adult)  Goal: Identify Related Risk Factors and Signs and Symptoms  Outcome: Ongoing (interventions implemented as appropriate)    Goal: Skin Integrity/Wound Healing  Outcome: Ongoing (interventions implemented as appropriate)      Problem: Pressure Ulcer (Adult)  Goal: Signs and Symptoms of Listed Potential Problems Will be Absent or Manageable (Pressure Ulcer)  Outcome: Ongoing (interventions implemented as appropriate)      Problem: Patient Care Overview (Adult)  Goal: Plan of Care Review  Outcome: Ongoing (interventions implemented as appropriate)    Goal: Adult Individualization and Mutuality  Outcome: Ongoing (interventions implemented as appropriate)    Goal: Discharge Needs Assessment  Outcome: Ongoing (interventions implemented as appropriate)

## 2018-01-25 NOTE — NURSING NOTE
Called CT again...still unable to scan patient at this time r/t high volume of ER patients ordered for CT scans

## 2018-01-25 NOTE — DISCHARGE PLACEMENT REQUEST
"Amy Do (82 y.o. Female)     Date of Birth Social Security Number Address Home Phone MRN    [id number]  [id number] [facility] [phone] [id number]    Latter day Marital Status          Other        Admission Date Admission Type Admitting Provider Attending Provider Department, Room/Bed    1/22/18 Emergency Aroldo Gonsalez DO Hammons, Mark Anthony Harrell MD [facility] CRITICAL CARE, CC10/1C    Discharge Date Discharge Disposition Discharge Destination                      Attending Provider: Mark Anthony Broussard MD     Allergies:  Contrast Dye, Penicillins, Sulfa Antibiotics    Isolation:  None   Infection:  None   Code Status:  FULL    Ht:  167.6 cm (66\")   Wt:  64.9 kg (143 lb)    Admission Cmt:  None   Principal Problem:  Pneumonia of left lower lobe due to infectious organism [J18.1]                 Active Insurance as of 1/22/2018     Primary Coverage     Payor Plan Insurance Group Employer/Plan Group    MEDICARE MEDICARE A & B      Payor Plan Address Payor Plan Phone Number Effective From Effective To    [address] [phone] 9/1/1997     [place] [address]       Subscriber Name Subscriber Birth Date Member ID       AMY DO 1935 [id number]           Secondary Coverage     Payor Plan Insurance Group Employer/Plan Group    [place]      Payor Plan Address Payor Plan Phone Number Effective From Effective To    [address] [phone] 8/24/2016     [place] [address]       Subscriber Name Subscriber Birth Date Member ID       AMY DO 1935 [id number]                 Emergency Contacts      (Rel.) Home Phone Work Phone Mobile Phone    Arnold Do (Mother) [phone] -- [phone]            Emergency Contact Information     Name Relation Home Work Mobile    Arnold Do Mother [phone]          Insurance Information                MEDICARE/MEDICARE A & B Phone: [phone]    " "Subscriber: Amy Do Subscriber#: [id number]    Group#:  Precert#:         [place] Phone: [phone]    Subscriber: Amy Do Subscriber#: [id number]    Group#:  Precert#:              History & Physical      Mark Anthony Broussard MD at 2018  4:27 PM          Patient Identification:  Name:  Amy Do  Age:  82 y.o.  Sex:  female  :  1935  MRN:  [id number]   Visit Number:  [id number]  Primary Care Physician:  Hank Cabral MD    I have seen the patient in conjunction with Vandana Jansen PA-C and I agree with the following statements:     Chief complaint: Altered mental status    History of presenting illness:  82 y.o. female patient of a local nursing home that presented to the ED via EMS with altered mental status.  This did reportedly improve with Narcan.  She was initially 69% on nasal cannula at the time of presentation and placed on venti-mask. PH did initially improve from 7.29 to 7.30  Then it fell to 7.24 after 3 hours on 50% ventimask. PCO2 remained elevated but unchanged, while bicarb had dropped to 19.4. Mrs. Do would squeeze eyes shut tightly when trying to examine them. She would not follow commands. She did eventually respond to her name and yell, \"I'M FINE\" when asked how she was feeling.  Butrans patch was noted on back during physical examination.   At the time of initial examination, there was no family available.  Much of history has been obtained from chart and ED staff.  She did also have a large bowel movement downstairs.  It was noted to be black and green.  She was also found to have left-side pneumonia, both upper and lower.  In the past, she did have a g-tube due to aspiration.  This was removed at some point.      Discussed with ED nurse Fatou as well. She reports nursing home reported she did also have 7.5 mg of hydrocodone last evening, in addition to her butrans patch.  There was also some concern she may have had extra " medications from unknown source in addition to these.     Patient has since been admitted to the CCU due to worsening ABG results on ventimask. Bipap was just initiated shortly after arrival to the CCU. Patient is more alert and oriented now, but cannot recall why she was brought to the ED today or give any history beyond a few words at at time.  ---------------------------------------------------------------------------------------------------------------------   Review of Systems   Unable to perform ROS: Mental status change      ---------------------------------------------------------------------------------------------------------------------   Past Medical History:   Diagnosis Date   • Allergic rhinitis    • Arthritis    • COPD (chronic obstructive pulmonary disease)    • Dementia    • Depression    • Diverticulitis    • Dysphagia    • GERD (gastroesophageal reflux disease)    • Hypertension      Past Surgical History:   Procedure Laterality Date   • APPENDECTOMY     • CHOLECYSTECTOMY     • DENTAL PROCEDURE     • HEMORRHOIDECTOMY     • HYSTERECTOMY     • TONSILLECTOMY       Family History   Problem Relation Age of Onset   • No Known Problems Mother    • No Known Problems Father    • No Known Problems Sister    • No Known Problems Brother    • No Known Problems Son    • No Known Problems Daughter    • No Known Problems Maternal Grandmother    • No Known Problems Maternal Grandfather    • No Known Problems Paternal Grandmother    • No Known Problems Paternal Grandfather    • No Known Problems Cousin    • Rheum arthritis Neg Hx    • Osteoarthritis Neg Hx    • Asthma Neg Hx    • Diabetes Neg Hx    • Heart failure Neg Hx    • Hyperlipidemia Neg Hx    • Hypertension Neg Hx    • Migraines Neg Hx    • Rashes / Skin problems Neg Hx    • Seizures Neg Hx    • Stroke Neg Hx    • Thyroid disease Neg Hx      Social History     Social History   • Marital status:      Spouse name: N/A   • Number of children: N/A   •  Years of education: N/A     Social History Main Topics   • Smoking status: Former Smoker     Types: Cigarettes   • Smokeless tobacco: None   • Alcohol use No   • Drug use: No   • Sexual activity: Defer     Other Topics Concern   • None     Social History Narrative     ---------------------------------------------------------------------------------------------------------------------   Allergies:  Contrast dye; Penicillins; and Sulfa antibiotics  ---------------------------------------------------------------------------------------------------------------------   Prior to Admission Medications     Prescriptions Last Dose Informant Patient Reported? Taking?    amLODIPine (NORVASC) 5 MG tablet   Yes No    Take 5 mg by mouth daily.    aspirin 81 MG EC tablet   Yes No    Take 81 mg by mouth daily.    azelastine (OPTIVAR) 0.05 % ophthalmic solution   Yes No    1 drop 2 (two) times a day.    baclofen (LIORESAL) 10 MG tablet   Yes No    Take 10 mg by mouth daily.    benzonatate (TESSALON) 200 MG capsule   Yes No    Take 200 mg by mouth 3 (three) times a day as needed for cough.    Cholecalciferol (VITAMIN D-3 PO)   Yes No    Take 50,000 Units by mouth 1 (one) time per week.    Cyanocobalamin (VITAMIN B 12 PO)   Yes No    Take 1,000 mcg by mouth daily.    donepezil (ARICEPT) 10 MG tablet   Yes No    Take 10 mg by mouth every night.    escitalopram (LEXAPRO) 10 MG tablet   Yes No    Take 10 mg by mouth daily.    ferrous sulfate 325 (65 FE) MG tablet   Yes No    Take 325 mg by mouth 2 (two) times a day.    fluticasone-salmeterol (ADVAIR DISKUS) 250-50 MCG/DOSE DISKUS   Yes No    Inhale 1 puff 2 (Two) Times a Day.    fluticasone-salmeterol (ADVAIR) 500-50 MCG/DOSE DISKUS   Yes No    Inhale 1 puff 2 (two) times a day.    gabapentin (NEURONTIN) 100 MG capsule   Yes No    Take 300 mg by mouth 2 (Two) Times a Day. Also takes 600 mg once daily    guaiFENesin 200 MG tablet   Yes No    Take 400 mg by mouth 2 (two) times a day.     HYDROcodone-acetaminophen (NORCO) 7.5-325 MG per tablet   Yes No    Take 1 tablet by mouth every 6 (six) hours as needed for moderate pain (4-6).    Lactobacillus (FLORANEX) pack oral packet   Yes No    Take  by mouth 3 (three) times a day.    lactulose (CHRONULAC) 10 GM/15ML solution   Yes No    Take 10 g by mouth daily as needed.    leflunomide (ARAVA) 20 MG tablet   Yes No    Take 20 mg by mouth daily.    Loratadine 10 MG capsule   Yes No    Take 10 mg by mouth every night.    memantine (NAMENDA) 10 MG tablet   Yes No    Take 10 mg by mouth 2 (two) times a day.    naproxen (naproxen) 375 MG tablet delayed-release EC tablet   No No    Take 1 tablet by mouth 2 (two) times a day as needed for mild pain (1-3).    nitroglycerin (NITROSTAT) 0.4 MG SL tablet   Yes No    Place 0.4 mg under the tongue every 5 (five) minutes as needed for chest pain. Take no more than 3 doses in 15 minutes.    pantoprazole (PROTONIX) 40 MG EC tablet   Yes No    Take 40 mg by mouth 2 (two) times a day.    polyethylene glycol (MIRALAX) packet   Yes No    Take 17 g by mouth daily.    Probiotic Product (SUSU-Q PO)   Yes No    Take 1 tablet by mouth daily.        Hospital Scheduled Meds:    [START ON 1/23/2018] aspirin 81 mg Oral Daily   aztreonam 2 g Intravenous Once   [START ON 1/23/2018] aztreonam 2 g Intravenous Q8H   heparin (porcine) 5,000 Units Subcutaneous Q12H   methylPREDNISolone sodium succinate 40 mg Intravenous Q12H   metroNIDAZOLE 500 mg Intravenous Q8H       Pharmacy to dose vancomycin     sodium chloride 100 mL/hr Last Rate: 100 mL/hr (01/22/18 0720)     ---------------------------------------------------------------------------------------------------------------------   Vital Signs:  Temp:  [97.6 °F (36.4 °C)-103.1 °F (39.5 °C)] 98 °F (36.7 °C)  Heart Rate:  [] 82  Resp:  [18-24] 18  BP: (102-164)/(52-90) 103/56  Last 3 weights    01/22/18  0906 01/22/18  1929   Weight: 63.7 kg (140 lb 8 oz) 62.7 kg (138 lb 3 oz)      Body mass index is 22.3 kg/(m^2).  ---------------------------------------------------------------------------------------------------------------------       Physical Exam    Physical Exam:  Constitutional:  Elderly female, chronically ill.  HENT:  Head: Normocephalic and atraumatic. Bipap mask in place.    Eyes:  Conjunctivae and EOM are normal.  Pupils are equal, round, and reactive to light.  No scleral icterus.  Neck:  Neck supple.  No JVD present.    Cardiovascular:  Normal rate, regular rhythm.  Normal s1/s2 with no murmur.  Pulmonary/Chest:  Diminished breath sounds bilaterally.  Bilateral crackles appreciated in the bases.  Abdominal:  Soft.  Bowel sounds are present.  No distension and no tenderness.   Musculoskeletal:  No edema, no tenderness, and no deformity.  No red or swollen joints anywhere.    Neurological:  Alert to self, place and year. Follows simple commands (this is an improvement compared to time of arrival to ED). No focal deficits appreciated.   Skin:  Skin is warm and dry. No pallor. Non-blanching erythema on heels and sacrum, but no skin breakdown. Has intertrigo in groin area.  Psychiatric: Mild confusion, unable to give history beyond 1-2 words.  Reported dementia.   Peripheral vascular:  No edema and strong pulses on all 4 extremities.  ---------------------------------------------------------------------------------------------------------------------  EKG:  NSR, HR 81. QTc 457. No overt ST changes to suggest acute ischemia.        ---------------------------------------------------------------------------------------------------------------------     Results from last 7 days  Lab Units 01/22/18  0931   LACTATE mmol/L 0.7   WBC 10*3/mm3 6.21   HEMOGLOBIN g/dL 12.5   HEMATOCRIT % 39.5   MCV fL 90.4   MCHC g/dL 31.6*   PLATELETS 10*3/mm3 204       Results from last 7 days  Lab Units 01/22/18  1431   PH, ARTERIAL pH units 7.240*   PO2 ART mm Hg 92.3   PCO2, ARTERIAL mm Hg 46.4*   HCO3  ART mmol/L 19.4*               Results from last 7 days  Lab Units 01/22/18  0931   SODIUM mmol/L 135   POTASSIUM mmol/L 3.6   CHLORIDE mmol/L 105   CO2 mmol/L 23.0*   BUN mg/dL 22*   CREATININE mg/dL 1.17   EGFR IF NONAFRICN AM mL/min/1.73 44*   CALCIUM mg/dL 8.7   GLUCOSE mg/dL 106   ALBUMIN g/dL 3.60   BILIRUBIN mg/dL 0.3   ALK PHOS U/L 68   AST (SGOT) U/L 23   ALT (SGPT) U/L 20   Estimated Creatinine Clearance: 36.7 mL/min (by C-G formula based on Cr of 1.17).  No results found for: AMMONIA    Results from last 7 days  Lab Units 01/22/18  1939   CK TOTAL U/L 276*  276*   TROPONIN I ng/mL 0.207*   CK MB INDEX % 2.1         Lab Results   Component Value Date    HGBA1C 4.70 01/22/2018     Lab Results   Component Value Date    TSH 0.267 (L) 01/22/2018     No results found for: PREGTESTUR, PREGSERUM, HCG, HCGQUANT  Pain Management Panel     Pain Management Panel Latest Ref Rng & Units 10/25/2016    AMPHETAMINES SCREEN, URINE Negative Negative    BARBITURATES SCREEN Negative Negative    BENZODIAZEPINE SCREEN, URINE Negative Negative    COCAINE SCREEN, URINE Negative Negative    METHADONE SCREEN, URINE Negative Negative                        ---------------------------------------------------------------------------------------------------------------------  Imaging Results (last 7 days)     Procedure Component Value Units Date/Time    XR Chest 1 View [[id number]] Updated:  01/22/18 1002    CT Abdomen Pelvis Without Contrast [[id number]] Collected:  01/22/18 1013     Updated:  01/22/18 1018    Narrative:       CT ABDOMEN PELVIS WO CONTRAST-     CLINICAL INDICATION: Abdominal pain, fever          COMPARISON: 11/15/2016     TECHNIQUE: Axial images were acquired from the lung bases through the  pubic symphysis without any IV or oral contrast.  Reformatted images were created in both the coronal and sagittal planes.     Radiation dose reduction techniques were utilized per ALARA protocol.  Automated exposure control was  initiated through either or Biomeasure or  DoseRight software packages by  protocol.           FINDINGS:   There is airspace disease in the lingula of the left upper lobe and in  the left lower lobe     The liver is homogeneous. There is no evidence of focal hepatic mass     The spleen is homogeneous     There is no peripancreatic stranding or pancreatic head mass.     There is no adrenal enlargement.     The kidneys show no evidence of hydronephrosis or hydroureter. I do not  see any distal ureteral stones.      Otherwise I do not see any free fluid or walled off fluid collections.     There are sigmoid diverticuli but no evidence of diverticulitis at this  time     Arthritic change in the spine     There is no evidence of mesenteric or retroperitoneal adenopathy               Impression:       : Left-sided pneumonia  Arthritic changes in the spine  Other findings as above                This report was finalized on 1/22/2018 10:16 AM by Dr. Fabio Zheng MD.       CT Head Without Contrast [[id number]] Collected:  01/22/18 1016     Updated:  01/22/18 1018    Narrative:       CT HEAD WO CONTRAST-     CLINICAL INDICATION: Confusion/delirium, altered LOC, unexplained          COMPARISON: 3/6/2017      TECHNIQUE: Axial images of the brain were obtained with out intravenous  contrast.  Reformatted images were created in the sagittal and coronal  planes.     DOSE:         Radiation dose reduction techniques were utilized per ALARA protocol.  Automated exposure control was initiated through either or Biomeasure or  DoseRight software packages by  protocol.           FINDINGS:   Today's study shows no mass, hemorrhage, or midline shift.   The ventricles, cisterns, and sulci are unremarkable. There is no  hydrocephalus.   There is no evidence of acute ischemia.  I do not see epidural or subdural hematoma.  There is global atrophy  The bone window setting images show no destructive calvarial lesion  or  acute calvarial fracture.   The posterior fossa is unremarkable.             Impression:       No acute intracranial pathology. Nothing is seen on this exam to  specifically account for the patient's symptoms.     This report was finalized on 1/22/2018 10:16 AM by Dr. Fabio Zheng MD.             Cultures:         I have personally reviewed the radiology images and read the final radiology report.  ---------------------------------------------------------------------------------------------------------------------  Assessment and Plan:    -Severe sepsis with temperature of 103.1, HR>90, RR of 24, acute hypoxic respiratory failure, acute renal failure and metabolic encephalopathy, felt to be secondary to left upper, lower lobe pneumonia: Broad spectrum IV abx have been ordered with IV azactam, flagyl, and vancomycin to cover for HCAP for which patient meets criteria. Also high suspicion for aspiration. Stool culture obtained in ED. Clostridium difficile was negative in the ED. Urinalysis unremarkable. Blood cultures obtained in ED. Will rule out for atypical organisms.      -Acute hypoxic and hypercapneic respiratory failure likely multifactorial in nature given left-sided pneumonia, COPD exacerbation in addition to possible oversedation with opioids: IV abx as previously outlined.  Orders placed to start BiPAP.  Mycoplasma and legionella ordered. SLP evaluation will be ordered when patient is more alert to assess for possible aspiration.  IV methylprednisone added 40 mg BID given wheezing, along with scheduled duonebs.      -Acute exacerbation of COPD: see plan above.      -Mixed respiratory and metabolic acidosis: started bipap upon arrival to CCU, continue to monitor ABG and adjust bipap settings accordingly.    -Metabolic encephalopathy superimposed on known dementia: CT head is unremarkable. Neuro checks have been added. As stated above in HPI, initial concern for overmedication at nursing home, received  narcan in ED and will hold potentially sedating home meds for now. Mentation seems to have improved since admission.      -Indeterminate troponin elevation: no EKG changes to suggest acute ischemia. Suspect related to severe sepsis, hypoxia and renal failure. Continue to trend cardiac enzymes.    -Acute renal failure: hydrate with IV fluids. Repeat labs in the morning.    -Diarrhea:  Hemoglobin is stable.  Fecal occult blood is negative.  Clostridium difficile testing is negative.  CT abdomen without acute abdominal changes. Continue to monitor.      -History of recurrent aspiration: see plan above. Speech therapy consulted to evaluate swallowing ability.    -Chronic narcotic use for reported arthritis history: again, holding potentially sedating home meds for now.    -DVT prophylaxis with SQ Heparin    Plan of care discussed with patient and her RN Jhonatan in the CCU.    * patient considered high risk due to severe sepsis secondary to left sided pneumonia (HCAP), COPD exacerbation, respiratory failure, mixed acidosis, metabolic encephalopathy, concern for aspiration, advanced age, dementia    Mark Anthony Broussard MD  01/22/18  9:33 PM  ---------------------------------------------------------------------------------------------------------------------     * I have seen the patient in conjunction with Vandana Jansen PA-C, and have amended her note to reflect my own findings, assessment and plan.       Electronically signed by Mark Anthony Broussard MD at 1/22/2018  9:49 PM        Vital Signs (last 24 hours)       01/24 0700  -  01/25 0659 01/25 0700  -  01/25 0913   Most Recent    Temp (°F) 98.1 -  100.1      98.9     98.9 (37.2)    Heart Rate 75 -  99    76 -  106     106    Resp 13 -  20      16     16    /63 -  161/85    138/68 -  152/79     138/68    SpO2 (%) 92 -  100    96 -  98     96          Intake & Output (last day)       01/24 0701 - 01/25 0700 01/25 0701 - 01/26 0700    I.V. (mL/kg)      IV  Piggyback 1100     Total Intake(mL/kg) 1100 (17)     Urine (mL/kg/hr) 1525 (1)     Stool 0 (0)     Total Output 1525      Net -425            Unmeasured Stool Occurrence 2 x         Hospital Medications (active)       Dose Frequency Start End    amLODIPine (NORVASC) tablet 5 mg 5 mg Daily 1/23/2018     Sig - Route: Take 1 tablet by mouth Daily. - Oral    Cosign for Ordering: Accepted by Aroldo Gonsalez DO on 1/23/2018  8:59 PM    aspirin EC tablet 81 mg 81 mg Daily 1/23/2018     Sig - Route: Take 1 tablet by mouth Daily. - Oral    atorvastatin (LIPITOR) tablet 20 mg 20 mg Nightly 1/23/2018     Sig - Route: Take 1 tablet by mouth Every Night. - Oral    bisacodyl (DULCOLAX) suppository 10 mg 10 mg Daily PRN 1/23/2018     Sig - Route: Insert 1 suppository into the rectum Daily As Needed for Constipation. - Rectal    Cosign for Ordering: Accepted by Aroldo Gonsalez DO on 1/23/2018  8:59 PM    budesonide-formoterol (SYMBICORT) 160-4.5 MCG/ACT inhaler 2 puff 2 puff 2 Times Daily - RT 1/23/2018     Sig - Route: Inhale 2 puffs 2 (Two) Times a Day. - Inhalation    Cosign for Ordering: Accepted by Aroldo Gonsalez DO on 1/23/2018  8:59 PM    busPIRone (BUSPAR) tablet 5 mg 5 mg Every 12 Hours Scheduled 1/23/2018     Sig - Route: Take 1 tablet by mouth Every 12 (Twelve) Hours. - Oral    Cosign for Ordering: Accepted by Aroldo Gonsalez DO on 1/23/2018  8:59 PM    carboxymethylcellulose (REFRESH PLUS) 0.5 % ophthalmic solution 1 drop 1 drop Every 4 Hours PRN 1/23/2018     Sig - Route: Administer 1 drop to both eyes Every 4 (Four) Hours As Needed for Dry Eyes. - Both Eyes    Cosign for Ordering: Accepted by Aroldo Gonsalez DO on 1/23/2018  8:59 PM    cetirizine (zyrTEC) tablet 5 mg 5 mg Daily 1/23/2018     Sig - Route: Take 0.5 tablets by mouth Daily. - Oral    Cosign for Ordering: Accepted by Aroldo Gonsalez DO on 1/23/2018  8:59 PM    cholecalciferol (VITAMIN D3) tablet 1,000 Units 1,000  Units Daily 1/23/2018     Sig - Route: Take 2.5 tablets by mouth Daily. - Oral    Cosign for Ordering: Accepted by Aroldo Gonsalez DO on 1/23/2018  8:59 PM    dextrose (D50W) solution 25 g 25 g Every 15 Minutes PRN 1/22/2018     Sig - Route: Infuse 50 mL into a venous catheter Every 15 (Fifteen) Minutes As Needed for Low Blood Sugar (Blood Sugar Less Than 70, Patient Has IV Access - Unresponsive, NPO or Unable To Safely Swallow). - Intravenous    dextrose (D50W) solution 25 g 25 g Every 15 Minutes PRN 1/23/2018     Sig - Route: Infuse 50 mL into a venous catheter Every 15 (Fifteen) Minutes As Needed for Low Blood Sugar (Blood Sugar Less Than 70, Patient Has IV Access - Unresponsive, NPO or Unable To Safely Swallow). - Intravenous    dextrose (GLUTOSE) oral gel 15 g 15 g Every 15 Minutes PRN 1/22/2018     Sig - Route: Take 15 g by mouth Every 15 (Fifteen) Minutes As Needed for Low Blood Sugar (Blood Sugar Less Than 70, Patient Alert, Is Not NPO & Can Safely Swallow). - Oral    dextrose (GLUTOSE) oral gel 15 g 15 g Every 15 Minutes PRN 1/23/2018     Sig - Route: Take 15 g by mouth Every 15 (Fifteen) Minutes As Needed for Low Blood Sugar (Blood Sugar Less Than 70, Patient Alert, Is Not NPO & Can Safely Swallow). - Oral    docusate sodium (COLACE) capsule 200 mg 200 mg 2 Times Daily PRN 1/23/2018     Sig - Route: Take 2 capsules by mouth 2 (Two) Times a Day As Needed for Constipation. - Oral    Notes to Pharmacy: TI for 250 mg cap    Cosign for Ordering: Accepted by Aroldo Gonsalez DO on 1/23/2018  8:59 PM    donepezil (ARICEPT) tablet 10 mg 10 mg Nightly 1/23/2018     Sig - Route: Take 2 tablets by mouth Every Night. - Oral    Cosign for Ordering: Accepted by Aroldo Gonsalez DO on 1/23/2018  8:59 PM    escitalopram (LEXAPRO) tablet 5 mg 5 mg Daily 1/23/2018     Sig - Route: Take 0.5 tablets by mouth Daily. - Oral    Cosign for Ordering: Accepted by Aroldo Gonsalez DO on 1/23/2018  8:59 PM     fentaNYL citrate (PF) (SUBLIMAZE) injection 100 mcg 100 mcg Every 1 Hour PRN 1/24/2018 2/3/2018    Sig - Route: Infuse 2 mL into a venous catheter Every 1 (One) Hour As Needed for Severe Pain . - Intravenous    fentaNYL citrate (PF) (SUBLIMAZE) injection 100 mcg 100 mcg Once 1/24/2018 1/24/2018    Sig - Route: Infuse 2 mL into a venous catheter 1 (One) Time. - Intravenous    ferrous sulfate tablet 325 mg 325 mg 2 Times Daily With Meals 1/23/2018     Sig - Route: Take 1 tablet by mouth 2 (Two) Times a Day With Meals. - Oral    Cosign for Ordering: Accepted by Aroldo Gonsalez DO on 1/23/2018  8:59 PM    glucagon (human recombinant) (GLUCAGEN DIAGNOSTIC) injection 1 mg 1 mg Every 15 Minutes PRN 1/22/2018     Sig - Route: Inject 1 mg under the skin Every 15 (Fifteen) Minutes As Needed (Blood Glucose Less Than 70 - Patient Without IV Access - Unresponsive, NPO or Unable To Safely Swallow). - Subcutaneous    glucagon (human recombinant) (GLUCAGEN DIAGNOSTIC) injection 1 mg 1 mg Every 15 Minutes PRN 1/23/2018     Sig - Route: Inject 1 mg under the skin Every 15 (Fifteen) Minutes As Needed (Blood Glucose Less Than 70 - Patient Without IV Access - Unresponsive, NPO or Unable To Safely Swallow). - Subcutaneous    guaiFENesin tablet 400 mg 400 mg 2 Times Daily (BID) 1/23/2018     Sig - Route: Take 2 tablets by mouth 2 (Two) Times a Day. - Oral    Cosign for Ordering: Accepted by Aroldo Gonsalez DO on 1/23/2018  8:59 PM    heparin (porcine) 5000 UNIT/ML injection 5,000 Units 5,000 Units Every 12 Hours Scheduled 1/22/2018     Sig - Route: Inject 1 mL under the skin Every 12 (Twelve) Hours. - Subcutaneous    HYDROcodone-acetaminophen (NORCO) 5-325 MG per tablet 1 tablet 1 tablet Every 6 Hours PRN 1/23/2018 2/2/2018    Sig - Route: Take 1 tablet by mouth Every 6 (Six) Hours As Needed for Moderate Pain . - Oral    insulin aspart (novoLOG) injection 0-7 Units 0-7 Units 4 Times Daily Before Meals & Nightly 1/23/2018      "Sig - Route: Inject 0-7 Units under the skin 4 (Four) Times a Day Before Meals & at Bedtime. - Subcutaneous    ipratropium-albuterol (DUO-NEB) nebulizer solution 3 mL 3 mL Every 6 Hours - RT 1/23/2018     Sig - Route: Take 3 mL by nebulization Every 6 (Six) Hours. - Nebulization    ketotifen (ZADITOR) 0.025 % ophthalmic solution 1 drop 1 drop 2 Times Daily (BID) 1/23/2018     Sig - Route: Administer 1 drop to both eyes 2 (Two) Times a Day. - Both Eyes    Cosign for Ordering: Accepted by Aroldo Gonsalez DO on 1/23/2018  8:59 PM    lactulose (CHRONULAC) 10 GM/15ML solution 10 g 10 g Daily PRN 1/23/2018     Sig - Route: Take 15 mL by mouth Daily As Needed (constipation). - Oral    Cosign for Ordering: Accepted by Aroldo Gonsalez DO on 1/23/2018  8:59 PM    magic barrier cream  4 Times Daily 1/23/2018     Sig - Route: Apply  topically 4 (Four) Times a Day. - Topical    Notes to Pharmacy: Apply to [facility]    Magnesium Sulfate 2 gram infusion- Mg 1.6 - 1.9 mg/dL 2 g As Needed 1/23/2018     Sig - Route: Infuse 50 mL into a venous catheter As Needed (Mg 1.6 - 1.9 mg/dL). - Intravenous    Linked Group 1:  \"Or\" Linked Group Details        magnesium sulfate 3 gram infusion (1gm x 3) - Mg 1.1 - 1.5 mg/dL 1 g As Needed 1/23/2018     Sig - Route: Infuse 100 mL into a venous catheter As Needed (Mg 1.1 - 1.5 mg/dL). - Intravenous    Linked Group 1:  \"Or\" Linked Group Details        magnesium sulfate 4 gram infusion - Mg less than or equal to 1mg/dL 4 g As Needed 1/23/2018     Sig - Route: Infuse 100 mL into a venous catheter As Needed (Mg less than or equal to 1mg/dL). - Intravenous    Linked Group 1:  \"Or\" Linked Group Details        memantine (NAMENDA) tablet 10 mg 10 mg 2 Times Daily (BID) 1/23/2018     Sig - Route: Take 1 tablet by mouth 2 (Two) Times a Day. - Oral    Cosign for Ordering: Accepted by Aroldo Gonsalez DO on 1/23/2018  8:59 PM    methylPREDNISolone sodium succinate (SOLU-Medrol) " injection 20 mg 20 mg Every 12 Hours 1/25/2018     Sig - Route: Infuse 0.5 mL into a venous catheter Every 12 (Twelve) Hours. - Intravenous    metroNIDAZOLE (FLAGYL) IVPB 500 mg 500 mg Every 8 Hours 1/22/2018 2/5/2018    Sig - Route: Infuse 100 mL into a venous catheter Every 8 (Eight) Hours. - Intravenous    midazolam (VERSED) injection 0.5 mg 0.5 mg Once 1/24/2018 1/24/2018    Sig - Route: Infuse 0.5 mL into a venous catheter 1 (One) Time. - Intravenous    nitroglycerin (NITROSTAT) SL tablet 0.4 mg 0.4 mg Every 5 Minutes PRN 1/23/2018     Sig - Route: Place 1 tablet under the tongue Every 5 (Five) Minutes As Needed for Chest Pain. - Sublingual    Cosign for Ordering: Accepted by Aroldo Gonsalez DO on 1/23/2018  8:59 PM    ondansetron (ZOFRAN) tablet 4 mg 4 mg Every 6 Hours PRN 1/23/2018     Sig - Route: Take 1 tablet by mouth Every 6 (Six) Hours As Needed for Nausea or Vomiting. - Oral    Cosign for Ordering: Accepted by Aroldo Gonsalez DO on 1/23/2018  8:59 PM    pantoprazole (PROTONIX) EC tablet 40 mg 40 mg 2 Times Daily (BID) 1/23/2018     Sig - Route: Take 1 tablet by mouth 2 (Two) Times a Day. - Oral    Cosign for Ordering: Accepted by Aroldo Gonsalez DO on 1/23/2018  8:59 PM    Pharmacy to dose vancomycin  Continuous PRN 1/22/2018 2/5/2018    Sig - Route: Continuous As Needed for Consult. - Does not apply    polyethylene glycol (MIRALAX) powder 17 g 17 g Daily PRN 1/23/2018     Sig - Route: Take 17 g by mouth Daily As Needed (constipation). - Oral    Cosign for Ordering: Accepted by Aroldo Gonsalez DO on 1/23/2018  8:59 PM    potassium chloride (K-DUR,KLOR-CON) CR tablet 40 mEq 40 mEq Every 4 Hours 1/24/2018 1/24/2018    Sig - Route: Take 2 tablets by mouth Every 4 (Four) Hours. - Oral    potassium chloride (KLOR-CON) packet 40 mEq 40 mEq As Needed 1/24/2018     Sig - Route: Take 40 mEq by mouth As Needed (potassium replacement, see admin instructions). - Oral    Linked Group 2:   "\"Or\" Linked Group Details        potassium chloride (MICRO-K) CR capsule 40 mEq 40 mEq As Needed 1/24/2018     Sig - Route: Take 4 capsules by mouth As Needed (potassium replacement.  see admin instructions). - Oral    Linked Group 2:  \"Or\" Linked Group Details        potassium chloride 10 mEq in 100 mL IVPB 10 mEq Every 1 Hour PRN 1/24/2018     Sig - Route: Infuse 100 mL into a venous catheter Every 1 (One) Hour As Needed (potassium protocol PERIPHERAL - see admin instructions). - Intravenous    Linked Group 2:  \"Or\" Linked Group Details        potassium phosphate 15 mmol in sodium chloride 0.9 % 100 mL infusion 15 mmol As Needed 1/24/2018     Sig - Route: Infuse 15 mmol into a venous catheter As Needed (Peripheral IV - Phosphorus 1.8 - 2.5). - Intravenous    Linked Group 3:  \"Or\" Linked Group Details        potassium phosphate 30 mmol in sodium chloride 0.9 % 250 mL infusion 30 mmol As Needed 1/24/2018     Sig - Route: Infuse 30 mmol into a venous catheter As Needed (Peripheral IV - Phosphorus 1.3 - 1.7). - Intravenous    Linked Group 3:  \"Or\" Linked Group Details        potassium phosphate 30 mmol in sodium chloride 0.9 % 250 mL infusion 30 mmol Once 1/24/2018 1/24/2018    Sig - Route: Infuse 30 mmol into a venous catheter 1 (One) Time. - Intravenous    potassium phosphate 45 mmol in sodium chloride 0.9 % 500 mL infusion 45 mmol As Needed 1/24/2018     Sig - Route: Infuse 45 mmol into a venous catheter As Needed (Peripheral IV - Phosphorus Less Than 1.3). - Intravenous    Linked Group 3:  \"Or\" Linked Group Details        Risaquad-2 capsule 1 capsule 1 capsule Daily 1/23/2018     Sig - Route: Take 1 capsule by mouth Daily. - Oral    Cosign for Ordering: Accepted by Aroldo Gonsalez DO on 1/23/2018  8:59 PM    sodium chloride 0.9 % flush 1-10 mL 1-10 mL As Needed 1/22/2018     Sig - Route: Infuse 1-10 mL into a venous catheter As Needed for Line Care. - Intravenous    sodium chloride 0.9 % flush 10 mL 10 mL " "As Needed 1/22/2018     Sig - Route: Infuse 10 mL into a venous catheter As Needed for Line Care. - Intravenous    Cosign for Ordering: Accepted by Sander Castro MD on 1/22/2018  9:43 AM    sodium phosphates 15 mmol in sodium chloride 0.9 % 250 mL IVPB 15 mmol As Needed 1/24/2018     Sig - Route: Infuse 15 mmol into a venous catheter As Needed (Peripheral IV - Phosphorus 1.8 - 2.5 & Potassium Greater Than 4). - Intravenous    Linked Group 3:  \"Or\" Linked Group Details        sodium phosphates 30 mmol in sodium chloride 0.9 % 250 mL IVPB 30 mmol As Needed 1/24/2018     Sig - Route: Infuse 30 mmol into a venous catheter As Needed (Peripheral IV - Phosphorus 1.3-1.7 & Potassium Greater Than 4). - Intravenous    Linked Group 3:  \"Or\" Linked Group Details        sodium phosphates 45 mmol in sodium chloride 0.9 % 500 mL IVPB 45 mmol As Needed 1/24/2018     Sig - Route: Infuse 45 mmol into a venous catheter As Needed (Peripheral IV - Phosphorus Less Than 1.3 & Potassium Greater Than 4). - Intravenous    Linked Group 3:  \"Or\" Linked Group Details        sterile water irrigation solution  - ADS Override Pull   1/24/2018 1/24/2018    Notes to Pharmacy: Created by cabinet override    vancomycin (VANCOCIN) 1,000 mg in sodium chloride 0.9 % 250 mL IVPB 1,000 mg Every 18 Hours 1/23/2018 2/5/2018    Sig - Route: Infuse 1,000 mg into a venous catheter Every 18 (Eighteen) Hours. - Intravenous    vitamin B-12 (CYANOCOBALAMIN) tablet 1,000 mcg 1,000 mcg Daily 1/23/2018     Sig - Route: Take 1 tablet by mouth Daily. - Oral    Cosign for Ordering: Accepted by Aroldo Gonsalez DO on 1/23/2018  8:59 PM    aztreonam (AZACTAM) 2 g/100 mL 0.9% NS (mbp) (Discontinued) 2 g Every 8 Hours 1/23/2018 1/25/2018    Sig - Route: Infuse 100 mL into a venous catheter Every 8 (Eight) Hours. - Intravenous    methylPREDNISolone sodium succinate (SOLU-Medrol) injection 40 mg (Discontinued) 40 mg Every 12 Hours 1/22/2018 1/25/2018    Sig - Route: " Infuse 1 mL into a venous catheter Every 12 (Twelve) Hours. - Intravenous    potassium chloride (KLOR-CON) packet 20 mEq (Discontinued) 20 mEq Once 1/24/2018 1/24/2018    Sig - Route: Take 20 mEq by mouth 1 (One) Time. - Oral    potassium chloride (KLOR-CON) packet 40 mEq (Discontinued) 40 mEq Once 1/24/2018 1/24/2018    Sig - Route: Take 40 mEq by mouth 1 (One) Time. - Oral            Lab Results (last 24 hours)     Procedure Component Value Units Date/Time    Amylase [[id number]]  (Abnormal) Collected:  01/24/18 0956    Specimen:  Blood Updated:  01/24/18 1037     Amylase 16 (L) U/L     Lipase [[id number]]  (Normal) Collected:  01/24/18 0956    Specimen:  Blood Updated:  01/24/18 1037     Lipase 33 U/L     POC Glucose Once [[id number]]  (Normal) Collected:  01/24/18 1028    Specimen:  Blood Updated:  01/24/18 1052     Glucose 101 mg/dL     Narrative:       Meter: [id number] : [facility]    Stool Culture - Stool, Per Rectum [[id number]] Collected:  01/22/18 1024    Specimen:  Stool from Per Rectum Updated:  01/24/18 1246     Stool Culture Normal Fecal Margy    Narrative:         Absence of Campylobacter antigen or below limit of detection.  Negative for Enterohemorrhagic E. coli Shiga Toxin 1.  Negative for Enterohemorrhagic E. coli Shiga Toxin 2.  No Salmonella or Shigella isolated. Yersinia and Vibrio not performed.    Blood Culture - Blood, [[id number]]  (Normal) Collected:  01/22/18 0931    Specimen:  Blood from Blood, Central Line Updated:  01/24/18 1316     Blood Culture No growth at 2 days    Blood Culture - Blood, [[id number]]  (Normal) Collected:  01/22/18 1513    Specimen:  Blood from Hand, Left Updated:  01/24/18 1716     Blood Culture No growth at 2 days    POC Glucose Once [[id number]]  (Normal) Collected:  01/24/18 1717    Specimen:  Blood Updated:  01/24/18 1724     Glucose 100 mg/dL     Narrative:       Meter: [id number] : [facility]    CBC & Differential [[id number]]  Collected:  01/25/18 0420    Specimen:  Blood Updated:  01/25/18 0433    Narrative:       The following orders were created for panel order CBC & Differential.  Procedure                               Abnormality         Status                     ---------                               -----------         ------                     CBC Auto Differential[[id number]]        Abnormal            Final result                 Please view results for these tests on the individual orders.    CBC Auto Differential [[id number]]  (Abnormal) Collected:  01/25/18 0420    Specimen:  Blood Updated:  01/25/18 0433     WBC 6.42 10*3/mm3      RBC 4.24 10*6/mm3      Hemoglobin 12.0 g/dL      Hematocrit 38.3 %      MCV 90.3 fL      MCH 28.3 pg      MCHC 31.3 (L) g/dL      RDW 14.3 %      RDW-SD 45.7 fl      MPV 8.8 fL      Platelets 233 10*3/mm3      Neutrophil % 83.8 (H) %      Lymphocyte % 8.6 (L) %      Monocyte % 7.3 %      Eosinophil % 0.0 %      Basophil % 0.0 %      Immature Grans % 0.3 %      Neutrophils, Absolute 5.38 10*3/mm3      Lymphocytes, Absolute 0.55 (L) 10*3/mm3      Monocytes, Absolute 0.47 10*3/mm3      Eosinophils, Absolute 0.00 10*3/mm3      Basophils, Absolute 0.00 10*3/mm3      Immature Grans, Absolute 0.02 10*3/mm3     Magnesium [[id number]]  (Normal) Collected:  01/25/18 0420    Specimen:  Blood Updated:  01/25/18 0454     Magnesium 1.9 mg/dL     Phosphorus [[id number]]  (Abnormal) Collected:  01/25/18 0420    Specimen:  Blood Updated:  01/25/18 0454     Phosphorus 2.0 (L) mg/dL     Comprehensive Metabolic Panel [[id number]]  (Abnormal) Collected:  01/25/18 0420    Specimen:  Blood Updated:  01/25/18 0502     Glucose 109 mg/dL      BUN 14 mg/dL      Creatinine 0.59 mg/dL      Sodium 143 mmol/L      Potassium 4.2 mmol/L      Chloride 109 mmol/L      CO2 29.2 mmol/L      Calcium 8.8 mg/dL      Total Protein 5.5 (L) g/dL      Albumin 3.20 (L) g/dL      ALT (SGPT) 19 U/L      AST (SGOT) 20 U/L      Alkaline  Phosphatase 54 U/L       Note New Reference Ranges        Total Bilirubin 0.2 mg/dL      eGFR Non African Amer 98 mL/min/1.73      Globulin 2.3 gm/dL      A/G Ratio 1.4 (L) g/dL      BUN/Creatinine Ratio 23.7     Anion Gap 4.8 mmol/L     Narrative:       The MDRD GFR formula is only valid for adults with stable renal function between ages 18 and 70.    Osmolality, Calculated [[id number]]  (Normal) Collected:  01/25/18 0420    Specimen:  Blood Updated:  01/25/18 0502     Osmolality Calc 286.0 mOsm/kg     C-reactive Protein [[id number]]  (Abnormal) Collected:  01/25/18 0420    Specimen:  Blood Updated:  01/25/18 0524     C-Reactive Protein 3.28 (H) mg/dL     Blood Gas, Arterial [[id number]]  (Abnormal) Collected:  01/25/18 0538    Specimen:  Arterial Blood Updated:  01/25/18 0545     Site Arterial: right radial     Ramirez's Test Positive     pH, Arterial 7.443 pH units      pCO2, Arterial 42.0 mm Hg      pO2, Arterial 69.5 (L) mm Hg      HCO3, Arterial 28.1 (H) mmol/L      Base Excess, Arterial 3.6 mmol/L      O2 Saturation, Arterial 94.5 %      Hemoglobin, Blood Gas 12.5 g/dL      Hematocrit, Blood Gas 37.0 %      Oxyhemoglobin 92.7 %      Methemoglobin 0.30 %      Carboxyhemoglobin 1.6 %      A-a Gradiant 231.2 mmHg      Temperature 98.6 C      Barometric Pressure for Blood Gas 729 mmHg      Modality HFNC     FIO2 51 %     POC Glucose Once [[id number]]  (Normal) Collected:  01/25/18 0621    Specimen:  Blood Updated:  01/25/18 0637     Glucose 104 mg/dL     Narrative:       Meter: [id number] : [id number] DRAGAN BENSON    POC Glucose Once [[id number]]  (Normal) Collected:  01/24/18 2047    Specimen:  Blood Updated:  01/25/18 0755     Glucose 91 mg/dL     Narrative:       Meter: [id number] : [id number] DRAGAN BENSON        Imaging Results (last 24 hours)     Procedure Component Value Units Date/Time    XR Chest 1 View [[id number]] Collected:  01/24/18 0856     Updated:  01/24/18 0928    Narrative:       XR  CHEST 1 VW-     CLINICAL INDICATION: chest tube placement; J18.1-Lobar pneumonia,  unspecified organism; A41.9-Sepsis, unspecified organism; R41.82-Altered  mental status, unspecified; J96.01-Acute respiratory failure with  hypoxia; J96.02-Acute respiratory failure with hypercapnia          COMPARISON: 1/24/2018      TECHNIQUE: Single frontal view of the chest.     FINDINGS:     Small caliber right-sided thoracostomy tube has been placed. There is  partial reexpansion of the right lung.  The cardiac silhouette is normal. The pulmonary vasculature is  unremarkable.  There is no evidence of an acute osseous abnormality.   There are no suspicious-appearing parenchymal soft tissue nodules.            Impression:       Persistent right-sided pneumothorax. Partial reexpansion. Post  right-sided thoracostomy tube.         This report was finalized on 1/24/2018 8:57 AM by Dr. Fabio Zheng MD.       XR Chest 1 View [[id number]] Collected:  01/24/18 1255     Updated:  01/24/18 1258    Narrative:       XR CHEST 1 VW-     CLINICAL INDICATION: pneumothorax; J18.1-Lobar pneumonia, unspecified  organism; A41.9-Sepsis, unspecified organism; R41.82-Altered mental  status, unspecified; J96.01-Acute respiratory failure with hypoxia;  J96.02-Acute respiratory failure with hypercapnia          COMPARISON: 1/24/2018      TECHNIQUE: Single frontal view of the chest.     FINDINGS:     Continued reexpansion. Now only a small right-sided pneumothorax is  present  The cardiac silhouette is normal. The pulmonary vasculature is  unremarkable.  There is no evidence of an acute osseous abnormality.   There are no suspicious-appearing parenchymal soft tissue nodules.            Impression:       Small residual right apical pneumothorax         This report was finalized on 1/24/2018 12:56 PM by Dr. Fabio Zheng MD.       XR Chest AP [[id number]] Collected:  01/24/18 0809     Updated:  01/24/18 1411    Narrative:       XR CHEST AP-     CLINICAL  INDICATION: Left-sided pneumonia; J18.1-Lobar pneumonia,  unspecified organism; A41.9-Sepsis, unspecified organism; R41.82-Altered  mental status, unspecified; J96.01-Acute respiratory failure with  hypoxia; J96.02-Acute respiratory failure with hypercapnia.          COMPARISON: 01/22/2018      TECHNIQUE: Single frontal view of the chest.     FINDINGS:     Interval development of a large right-sided pneumothorax.  The cardiac silhouette is normal. The pulmonary vasculature is  unremarkable.  There is no evidence of an acute osseous abnormality.   There are no suspicious-appearing parenchymal soft tissue nodules.            Impression:       Interval development of a large right-sided pneumothorax.     I have discussed this with Dr. Gonsalez.         This report was finalized on 1/24/2018 2:09 PM by Dr. Fabio Zheng MD.       XR Chest AP [[id number]] Collected:  01/25/18 0726     Updated:  01/25/18 0729    Narrative:       XR CHEST AP-     CLINICAL INDICATION: Pneumothorax; J18.1-Lobar pneumonia, unspecified  organism; A41.9-Sepsis, unspecified organism; R41.82-Altered mental  status, unspecified; J96.01-Acute respiratory failure with hypoxia;  J96.02-Acute respiratory failure with hypercapnia          COMPARISON: 1/24/2018      TECHNIQUE: Single frontal view of the chest.     FINDINGS:     The right thoracostomy tube appears to have been partially retracted.  There is right-sided pneumothorax, larger than on the previous exam.  The cardiac silhouette is normal. The pulmonary vasculature is  unremarkable.  There is no evidence of an acute osseous abnormality.   There are no suspicious-appearing parenchymal soft tissue nodules.            Impression:       It appears that the right thoracostomy tube has been partially retracted  and the pneumothorax has become larger since the prior study.         This report was finalized on 1/25/2018 7:27 AM by Dr. Fabio Zheng MD.           Operative/Procedure Notes (last 24 hours)  (Notes from 1/24/2018  9:13 AM through 1/25/2018  9:13 AM)     No notes of this type exist for this encounter.           Physician Progress Notes (last 24 hours) (Notes from 1/24/2018  9:13 AM through 1/25/2018  9:13 AM)      Aroldo Gonsalez, DO at 1/24/2018  9:20 AM  Version 1 of 1         Subjective     History:   Amy Do is a 82 y.o. female admitted on 1/22/2018 secondary to Pneumonia of left lower lobe due to infectious organism     Procedures:   1/22/18: Right subclavian central line placement in the ED    Patient seen and examined with ANTWAN Patel. Awake and alert. Currently reporting dyspnea. Reports nausea, abdominal pain and diarrhea. Denies CP. FiO2 increased this AM. CXR this AM reveals right-sided pneumothorax and pulm is planning to place a chest tube.     History taken from: patient, chart, and RN.      Objective     Vital Signs  Temp:  [97.7 °F (36.5 °C)-98.7 °F (37.1 °C)] 98.1 °F (36.7 °C)  Heart Rate:  [] 83  Resp:  [16-23] 18  BP: (116-175)/() 116/62    Intake/Output Summary (Last 24 hours) at 01/24/18 0921  Last data filed at 01/24/18 0913   Gross per 24 hour   Intake             1100 ml   Output             2800 ml   Net            -1700 ml         Physical Exam:  General:    Awake, alert, appears pale, chronically ill appearing   Heart:      Normal S1 and S2. Regular rate and rhythm. No significant murmur, rubs or gallops appreciated.   Lungs:     Respirations regular, even and unlabored. Decreased right-sided breath sounds. Left-sided rhonchi improved from yesterday.    Abdomen:   Soft. Mild generalized TTP, worse in RENÉE region. No guarding, rebound tenderness or  organomegaly noted. Bowel sounds present x 4.   Extremities:  No clubbing, cyanosis or edema noted. Moves UE and LE equally B/L.     Results Review:      Results from last 7 days  Lab Units 01/24/18  0059 01/23/18  0108 01/22/18  0931   WBC 10*3/mm3 5.05 5.75 6.21   HEMOGLOBIN g/dL 11.9* 11.7* 12.5   PLATELETS  10*3/mm3 212 207 204       Results from last 7 days  Lab Units 01/24/18  0059 01/23/18  0108 01/22/18  0931   SODIUM mmol/L 141 141 135   POTASSIUM mmol/L 3.3* 3.8 3.6   CHLORIDE mmol/L 109 111 105   CO2 mmol/L 26.0 23.8* 23.0*   BUN mg/dL 13 22* 22*   CREATININE mg/dL 0.54 0.79 1.17   CALCIUM mg/dL 8.3 8.4 8.7   GLUCOSE mg/dL 118* 99 106       Results from last 7 days  Lab Units 01/24/18  0059 01/22/18  0931   BILIRUBIN mg/dL 0.2 0.3   ALK PHOS U/L 63 68   AST (SGOT) U/L 22 23   ALT (SGPT) U/L 22 20       Results from last 7 days  Lab Units 01/24/18  0059 01/23/18  0658 01/23/18  0108   MAGNESIUM mg/dL 2.0 2.7* 1.4*           Results from last 7 days  Lab Units 01/23/18  0658 01/23/18  0108 01/22/18  1939   CK TOTAL U/L 229*  229* 264* 276*  276*   TROPONIN I ng/mL 0.098* 0.146* 0.207*   CK MB INDEX % 2.7 2.5 2.1       Imaging Results (last 24 hours)     Procedure Component Value Units Date/Time    FL Fiberoptic Endo (fees) [[id number]] Updated:  01/23/18 1034    XR Chest 1 View [[id number]] Collected:  01/23/18 1550     Updated:  01/23/18 1605    Narrative:       XR CHEST 1 VIEW-     CLINICAL INDICATION: Simple Sepsis triage protocol          COMPARISON: 10/25/2016.      TECHNIQUE: Single frontal view of the chest.     FINDINGS:     Right-sided central line tip appears to be in the region of the  cavoatrial junction.  Possible right basilar consolidation.  There is no evidence of an acute osseous abnormality.   There are no suspicious-appearing parenchymal soft tissue nodules.            Impression:       Equivocal right basilar consolidation.         This report was finalized on 1/23/2018 4:03 PM by Dr. Fabio Zheng MD.       XR Chest AP [[id number]] Collected:  01/24/18 0809     Updated:  01/24/18 0809    Narrative:       XR CHEST AP-     CLINICAL INDICATION: Left-sided pneumonia; J18.1-Lobar pneumonia,  unspecified organism; A41.9-Sepsis, unspecified organism; R41.82-Altered  mental status, unspecified;  J96.01-Acute respiratory failure with  hypoxia; J96.02-Acute respiratory failure with hypercapnia.          COMPARISON: 01/22/2018      TECHNIQUE: Single frontal view of the chest.     FINDINGS:     Interval development of a large right-sided pneumothorax.  The cardiac silhouette is normal. The pulmonary vasculature is  unremarkable.  There is no evidence of an acute osseous abnormality.   There are no suspicious-appearing parenchymal soft tissue nodules.            Impression:       Interval development of a large right-sided pneumothorax.     I have discussed this with Dr. Gonsalez.                   Medications:    amLODIPine 5 mg Oral Daily   aspirin 81 mg Oral Daily   atorvastatin 20 mg Oral Nightly   aztreonam 2 g Intravenous Q8H   budesonide-formoterol 2 puff Inhalation BID - RT   busPIRone 5 mg Oral Q12H   cetirizine 5 mg Oral Daily   cholecalciferol 1,000 Units Oral Daily   donepezil 10 mg Oral Nightly   escitalopram 5 mg Oral Daily   ferrous sulfate 325 mg Oral BID With Meals   guaiFENesin 400 mg Oral BID   heparin (porcine) 5,000 Units Subcutaneous Q12H   insulin aspart 0-7 Units Subcutaneous 4x Daily AC & at Bedtime   ipratropium-albuterol 3 mL Nebulization Q6H - RT   ketotifen 1 drop Both Eyes BID   magic barrier cream  Topical 4x Daily   memantine 10 mg Oral BID   methylPREDNISolone sodium succinate 40 mg Intravenous Q12H   metroNIDAZOLE 500 mg Intravenous Q8H   pantoprazole 40 mg Oral BID   potassium phosphate infusion greater than 15 mMoles 30 mmol Intravenous Once   Risaquad-2 1 capsule Oral Daily   sterile water      vancomycin 1,000 mg Intravenous Q18H   vitamin B-12 1,000 mcg Oral Daily       Pharmacy to dose vancomycin            Assessment/Plan   Severe sepsis: Likely 2/2 left-sided pneumonia. Currently hemodynamically stable. WBC is stable. Lactic acid is normal. CRP is elevated. C diff is negative. Cultures with NGTD. Currently on broad spectrum IV antibiotics including Vanc, Azactam and  Flagyl. Cont to follow cultures and repeat labs in the AM.     Acute hypoxic and hypercapnic respiratory failure: Likely multifactorial 2/2 pneumonia, COPD exacerbation and possible oversedation with opioids. CXR this AM reveals right-sided pneumothorax which is likely now contributing as well. Remains on HFNC. Cont to monitor closely in the CCU. Pulm input appreciated.     Left-sided pneumonia: Mycoplasma is negative. Legionella ordered but not yet collected. No evidence of aspiration on speech eval. Cont antibiotics as outlined above. Repeat CXR in the AM.     Acute exacerbation of COPD: Cont treatment as outlined above.     Metabolic encephalopathy: CT head negative for any acute abnormalities. Pt's mental status improved with Narcan in the ED. Appears overall improved. Cont treatment as outlined above and supportive treatment.     Right-sided pneumothorax: Pulm to place chest tube this AM. Repeat CXR following chest tube placement and again in the AM. Pulm input appreciated.     Indeterminate range troponin elevation: Possibly 2/2 above. Serial CE's have trended downward. Echo reveals an EF of 66-70%, grade I diastolic dysfunction, mild AR, mild AS, mild MR, mild MS, mild TR and moderately elevated RVSP. Cardiology to consider nuclear stress test once her respiratory status improves.  Monitor on telemetry.     Arthritis: Concern for medication misuse upon admission. PRN Norco added back now that her mental status has improved to avoid withdrawal.     Nausea, abdominal pain and diarrhea: Stool studies are negative. Cont PPI. Order CT abd/pelvis.     Electrolyte abnormalities: K+ and PO4 low this AM. Start electrolyte replacement protocols and repeat labs in the AM.     DVT PPX: SQ heparin    Discussed with Carlos Zheng and Iain.     Pt is at high risk 2/2 severe sepsis, resp failure, pneumonia, COPD exacerbation, pneumothorax, metabolic encephalopathy, concern for medication misuse and advanced age.        Aroldo Gonsalez DO  01/24/18  9:21 AM     Electronically signed by Aroldo Gonsalez DO at 1/24/2018  9:33 AM      Kenroy Timmons MD at 1/24/2018 10:28 AM  Version 2 of 2          LOS: 2 days     Chief Complaint:  Pulmonology is following for respiratory failure     Subjective     Interval History:     Mrs. Do is doing well this morning, no acute distress noted on exam.     History taken from: patient chart RN    Review of Systems:   Review of Systems   Constitutional: Negative for chills, fatigue and fever.   HENT: Negative for congestion and rhinorrhea.    Eyes: Negative for photophobia and visual disturbance.   Respiratory: Negative for cough, shortness of breath and wheezing.    Cardiovascular: Negative for chest pain and leg swelling.   Gastrointestinal: Negative for abdominal distention and abdominal pain.   Endocrine: Negative for cold intolerance and heat intolerance.   Genitourinary: Negative for difficulty urinating.   Musculoskeletal: Negative for arthralgias, back pain and gait problem.   Skin: Negative for color change.   Allergic/Immunologic: Negative for environmental allergies.   Neurological: Negative for dizziness, weakness and light-headedness.   Psychiatric/Behavioral: Negative for agitation, behavioral problems and confusion.                     Objective     Vital Signs  Temp:  [97.7 °F (36.5 °C)-98.7 °F (37.1 °C)] 98.1 °F (36.7 °C)  Heart Rate:  [] 82  Resp:  [16-23] 20  BP: (115-162)/() 115/59  Body mass index is 22.3 kg/(m^2).    Intake/Output Summary (Last 24 hours) at 01/24/18 1028  Last data filed at 01/24/18 1023   Gross per 24 hour   Intake             1350 ml   Output             2800 ml   Net            -1450 ml     I/O this shift:  In: 350 [IV Piggyback:350]  Out: -     Physical Exam:  GENERAL APPEARANCE:  alert and cooperative, and appears to be in no acute distress. Very hard of hearing, elderly female.     HEAD: normocephalic.    EYES:  PERRL    NECK: Neck supple.     CARDIAC: Normal S1 and S2. No S3, S4 or murmurs. Rhythm is regular. There is no peripheral edema, cyanosis or pallor. Extremities are warm and well perfused. Capillary refill is less than 2 seconds. No carotid bruits.    Respiratory: Clear to auscultation     GI: Positive bowel sounds. Soft, nondistended, nontender.     Musculoskeletal: No significant deformity or joint abnormality. No edema. Peripheral pulses intact.     NEUROLOGICAL: Strength and sensation symmetric and intact throughout.     PSYCHIATRIC: The mental examination revealed the patient was oriented to person, place, and time.                 Results Review:                I reviewed the patient's new clinical results.  I reviewed the patient's new imaging results and agree with the interpretation.    Results from last 7 days  Lab Units 01/24/18 0059 01/23/18 0108 01/22/18  0931   WBC 10*3/mm3 5.05 5.75 6.21   HEMOGLOBIN g/dL 11.9* 11.7* 12.5   PLATELETS 10*3/mm3 212 207 204       Results from last 7 days  Lab Units 01/24/18 0059 01/23/18  0658 01/23/18 0108 01/22/18  0931   SODIUM mmol/L 141  --  141 135   POTASSIUM mmol/L 3.3*  --  3.8 3.6   CHLORIDE mmol/L 109  --  111 105   CO2 mmol/L 26.0  --  23.8* 23.0*   BUN mg/dL 13  --  22* 22*   CREATININE mg/dL 0.54  --  0.79 1.17   CALCIUM mg/dL 8.3  --  8.4 8.7   GLUCOSE mg/dL 118*  --  99 106   MAGNESIUM mg/dL 2.0 2.7* 1.4*  --      Lab Results   Component Value Date    INR 0.93 03/06/2017    INR <0.90 10/25/2016    PROTIME 10.3 03/06/2017    PROTIME 10.1 10/25/2016       Results from last 7 days  Lab Units 01/24/18 0059 01/22/18  0931   ALK PHOS U/L 63 68   BILIRUBIN mg/dL 0.2 0.3   ALT (SGPT) U/L 22 20   AST (SGOT) U/L 22 23       Results from last 7 days  Lab Units 01/24/18  0455   PH, ARTERIAL pH units 7.338*   PO2 ART mm Hg 58.6*   PCO2, ARTERIAL mm Hg 52.1*   HCO3 ART mmol/L 27.4*     Imaging Results (last 24 hours)     Procedure Component Value Units Date/Time     FL Fiberoptic Endo (fees) [[id number]] Updated:  01/23/18 1034    XR Chest 1 View [[id number]] Collected:  01/23/18 1550     Updated:  01/23/18 1605    Narrative:       XR CHEST 1 VIEW-     CLINICAL INDICATION: Simple Sepsis triage protocol          COMPARISON: 10/25/2016.      TECHNIQUE: Single frontal view of the chest.     FINDINGS:     Right-sided central line tip appears to be in the region of the  cavoatrial junction.  Possible right basilar consolidation.  There is no evidence of an acute osseous abnormality.   There are no suspicious-appearing parenchymal soft tissue nodules.            Impression:       Equivocal right basilar consolidation.         This report was finalized on 1/23/2018 4:03 PM by Dr. Fabio Zheng MD.       XR Chest AP [[id number]] Collected:  01/24/18 0809     Updated:  01/24/18 0809    Narrative:       XR CHEST AP-     CLINICAL INDICATION: Left-sided pneumonia; J18.1-Lobar pneumonia,  unspecified organism; A41.9-Sepsis, unspecified organism; R41.82-Altered  mental status, unspecified; J96.01-Acute respiratory failure with  hypoxia; J96.02-Acute respiratory failure with hypercapnia.          COMPARISON: 01/22/2018      TECHNIQUE: Single frontal view of the chest.     FINDINGS:     Interval development of a large right-sided pneumothorax.  The cardiac silhouette is normal. The pulmonary vasculature is  unremarkable.  There is no evidence of an acute osseous abnormality.   There are no suspicious-appearing parenchymal soft tissue nodules.            Impression:       Interval development of a large right-sided pneumothorax.     I have discussed this with Dr. Gonsalez.           XR Chest 1 View [[id number]] Collected:  01/24/18 0856     Updated:  01/24/18 0928    Narrative:       XR CHEST 1 VW-     CLINICAL INDICATION: chest tube placement; J18.1-Lobar pneumonia,  unspecified organism; A41.9-Sepsis, unspecified organism; R41.82-Altered  mental status, unspecified; J96.01-Acute respiratory  failure with  hypoxia; J96.02-Acute respiratory failure with hypercapnia          COMPARISON: 1/24/2018      TECHNIQUE: Single frontal view of the chest.     FINDINGS:     Small caliber right-sided thoracostomy tube has been placed. There is  partial reexpansion of the right lung.  The cardiac silhouette is normal. The pulmonary vasculature is  unremarkable.  There is no evidence of an acute osseous abnormality.   There are no suspicious-appearing parenchymal soft tissue nodules.            Impression:       Persistent right-sided pneumothorax. Partial reexpansion. Post  right-sided thoracostomy tube.         This report was finalized on 1/24/2018 8:57 AM by Dr. Fabio Zheng MD.                Medication Review:   Scheduled Medications:    amLODIPine 5 mg Oral Daily   aspirin 81 mg Oral Daily   atorvastatin 20 mg Oral Nightly   aztreonam 2 g Intravenous Q8H   budesonide-formoterol 2 puff Inhalation BID - RT   busPIRone 5 mg Oral Q12H   cetirizine 5 mg Oral Daily   cholecalciferol 1,000 Units Oral Daily   donepezil 10 mg Oral Nightly   escitalopram 5 mg Oral Daily   ferrous sulfate 325 mg Oral BID With Meals   guaiFENesin 400 mg Oral BID   heparin (porcine) 5,000 Units Subcutaneous Q12H   insulin aspart 0-7 Units Subcutaneous 4x Daily AC & at Bedtime   ipratropium-albuterol 3 mL Nebulization Q6H - RT   ketotifen 1 drop Both Eyes BID   magic barrier cream  Topical 4x Daily   memantine 10 mg Oral BID   methylPREDNISolone sodium succinate 40 mg Intravenous Q12H   metroNIDAZOLE 500 mg Intravenous Q8H   pantoprazole 40 mg Oral BID   potassium phosphate infusion greater than 15 mMoles 30 mmol Intravenous Once   Risaquad-2 1 capsule Oral Daily   sterile water      vancomycin 1,000 mg Intravenous Q18H   vitamin B-12 1,000 mcg Oral Daily     Continuous infusions:    Pharmacy to dose vancomycin        Assessment/Plan     Neuro:  awake, alert and oriented no concerns.       Respiratory Failure-hypercarbic and hypoxic: likely  related to underlying lung disease, COPD, and pneumonia likely related to aspiration. Continue antibiotics. SLP evaluation reviewed, universal aspiration precautions.   Continue BiPap for shortness of breath and HS, will use HFNC during the day, titrate Fi02 to maintain Sp02 >92-94%     Incentive spirometer 10 times an hour. Continue scheduled inhalants and nebulizer's.     Chest xray from this morning reviewed, right pneumothorax noted, will place chest tube.      Cardiac: HR 82, /59 continue continuous ECG and v/s monitoring, maintain MAP >65.     Renal: Creatinine .54 ,  continue strict I&O, electrolytes replaced accordingly.     Hypokalemia: level is 3.3, will give potassium 20 meq PO x1. Recheck level in Am.     Hypophosphatemia: level is 1.7, replaced by primary, recheck level in AM.      GI: continue protonix.      DVT prophylaxis: heparin SQ BID.      IV access: central line in right subclavian 1/22/18.      ID: WBC is 5.75, neutrophils are 88.3, she does have fever, continue vanco, azactam and flagyl. Continue to monitor lab trends and clinical changes. Influenza negative, blood cultures are negative currently.     Patient Active Problem List   Diagnosis Code   • Pneumonia of left lower lobe due to infectious organism J18.1       ROLLY Rocha  01/24/18  10:28 AM      Scribed for Dr. Timmons by ROLLY Florez.     IKenroy M.D. attest that the above note accurately reflects the work and decisions made  by me.  Patient was seen and evaluated by Dr. Timmons, including history of present illness, physical exam, assessment, and treatment plan.  The above note was reviewed and edited by Dr. Timmons.Critical Care time spent in direct patient care: 33 minutes (excluding procedure time, if applicable) including high complexity decision making to assess, manipulate, and support vital organ system failure in this individual who has impairment of one or more vital organ systems such  that there is a high probability of imminent or life threatening deterioration in the patient’s condition.     Electronically signed by Kenroy Timmons MD at 1/24/2018  1:59 PM      ROLLY Rocha at 1/24/2018 10:28 AM  Version 1 of 2          LOS: 2 days     Chief Complaint:  Pulmonology is following for respiratory failure     Subjective     Interval History:     Mrs. Do is doing well this morning, no acute distress noted on exam.     History taken from: patient chart RN    Review of Systems:   Review of Systems   Constitutional: Negative for chills, fatigue and fever.   HENT: Negative for congestion and rhinorrhea.    Eyes: Negative for photophobia and visual disturbance.   Respiratory: Negative for cough, shortness of breath and wheezing.    Cardiovascular: Negative for chest pain and leg swelling.   Gastrointestinal: Negative for abdominal distention and abdominal pain.   Endocrine: Negative for cold intolerance and heat intolerance.   Genitourinary: Negative for difficulty urinating.   Musculoskeletal: Negative for arthralgias, back pain and gait problem.   Skin: Negative for color change.   Allergic/Immunologic: Negative for environmental allergies.   Neurological: Negative for dizziness, weakness and light-headedness.   Psychiatric/Behavioral: Negative for agitation, behavioral problems and confusion.                     Objective     Vital Signs  Temp:  [97.7 °F (36.5 °C)-98.7 °F (37.1 °C)] 98.1 °F (36.7 °C)  Heart Rate:  [] 82  Resp:  [16-23] 20  BP: (115-162)/() 115/59  Body mass index is 22.3 kg/(m^2).    Intake/Output Summary (Last 24 hours) at 01/24/18 1028  Last data filed at 01/24/18 1023   Gross per 24 hour   Intake             1350 ml   Output             2800 ml   Net            -1450 ml     I/O this shift:  In: 350 [IV Piggyback:350]  Out: -     Physical Exam:  GENERAL APPEARANCE:  alert and cooperative, and appears to be in no acute distress. Very hard of hearing,  elderly female.     HEAD: normocephalic.    EYES: PERRL    NECK: Neck supple.     CARDIAC: Normal S1 and S2. No S3, S4 or murmurs. Rhythm is regular. There is no peripheral edema, cyanosis or pallor. Extremities are warm and well perfused. Capillary refill is less than 2 seconds. No carotid bruits.    Respiratory: Clear to auscultation     GI: Positive bowel sounds. Soft, nondistended, nontender.     Musculoskeletal: No significant deformity or joint abnormality. No edema. Peripheral pulses intact.     NEUROLOGICAL: Strength and sensation symmetric and intact throughout.     PSYCHIATRIC: The mental examination revealed the patient was oriented to person, place, and time.                 Results Review:                I reviewed the patient's new clinical results.  I reviewed the patient's new imaging results and agree with the interpretation.    Results from last 7 days  Lab Units 01/24/18 0059 01/23/18 0108 01/22/18  0931   WBC 10*3/mm3 5.05 5.75 6.21   HEMOGLOBIN g/dL 11.9* 11.7* 12.5   PLATELETS 10*3/mm3 212 207 204       Results from last 7 days  Lab Units 01/24/18 0059 01/23/18  0658 01/23/18 0108 01/22/18  0931   SODIUM mmol/L 141  --  141 135   POTASSIUM mmol/L 3.3*  --  3.8 3.6   CHLORIDE mmol/L 109  --  111 105   CO2 mmol/L 26.0  --  23.8* 23.0*   BUN mg/dL 13  --  22* 22*   CREATININE mg/dL 0.54  --  0.79 1.17   CALCIUM mg/dL 8.3  --  8.4 8.7   GLUCOSE mg/dL 118*  --  99 106   MAGNESIUM mg/dL 2.0 2.7* 1.4*  --      Lab Results   Component Value Date    INR 0.93 03/06/2017    INR <0.90 10/25/2016    PROTIME 10.3 03/06/2017    PROTIME 10.1 10/25/2016       Results from last 7 days  Lab Units 01/24/18 0059 01/22/18  0931   ALK PHOS U/L 63 68   BILIRUBIN mg/dL 0.2 0.3   ALT (SGPT) U/L 22 20   AST (SGOT) U/L 22 23       Results from last 7 days  Lab Units 01/24/18  0455   PH, ARTERIAL pH units 7.338*   PO2 ART mm Hg 58.6*   PCO2, ARTERIAL mm Hg 52.1*   HCO3 ART mmol/L 27.4*     Imaging Results (last 24  hours)     Procedure Component Value Units Date/Time    FL Fiberoptic Endo (fees) [[id number]] Updated:  01/23/18 1034    XR Chest 1 View [[id number]] Collected:  01/23/18 1550     Updated:  01/23/18 1605    Narrative:       XR CHEST 1 VIEW-     CLINICAL INDICATION: Simple Sepsis triage protocol          COMPARISON: 10/25/2016.      TECHNIQUE: Single frontal view of the chest.     FINDINGS:     Right-sided central line tip appears to be in the region of the  cavoatrial junction.  Possible right basilar consolidation.  There is no evidence of an acute osseous abnormality.   There are no suspicious-appearing parenchymal soft tissue nodules.            Impression:       Equivocal right basilar consolidation.         This report was finalized on 1/23/2018 4:03 PM by Dr. Fabio Zheng MD.       XR Chest AP [[id number]] Collected:  01/24/18 0809     Updated:  01/24/18 0809    Narrative:       XR CHEST AP-     CLINICAL INDICATION: Left-sided pneumonia; J18.1-Lobar pneumonia,  unspecified organism; A41.9-Sepsis, unspecified organism; R41.82-Altered  mental status, unspecified; J96.01-Acute respiratory failure with  hypoxia; J96.02-Acute respiratory failure with hypercapnia.          COMPARISON: 01/22/2018      TECHNIQUE: Single frontal view of the chest.     FINDINGS:     Interval development of a large right-sided pneumothorax.  The cardiac silhouette is normal. The pulmonary vasculature is  unremarkable.  There is no evidence of an acute osseous abnormality.   There are no suspicious-appearing parenchymal soft tissue nodules.            Impression:       Interval development of a large right-sided pneumothorax.     I have discussed this with Dr. Gonsalez.           XR Chest 1 View [[id number]] Collected:  01/24/18 0856     Updated:  01/24/18 0928    Narrative:       XR CHEST 1 VW-     CLINICAL INDICATION: chest tube placement; J18.1-Lobar pneumonia,  unspecified organism; A41.9-Sepsis, unspecified organism;  R41.82-Altered  mental status, unspecified; J96.01-Acute respiratory failure with  hypoxia; J96.02-Acute respiratory failure with hypercapnia          COMPARISON: 1/24/2018      TECHNIQUE: Single frontal view of the chest.     FINDINGS:     Small caliber right-sided thoracostomy tube has been placed. There is  partial reexpansion of the right lung.  The cardiac silhouette is normal. The pulmonary vasculature is  unremarkable.  There is no evidence of an acute osseous abnormality.   There are no suspicious-appearing parenchymal soft tissue nodules.            Impression:       Persistent right-sided pneumothorax. Partial reexpansion. Post  right-sided thoracostomy tube.         This report was finalized on 1/24/2018 8:57 AM by Dr. Fabio Zheng MD.                Medication Review:   Scheduled Medications:    amLODIPine 5 mg Oral Daily   aspirin 81 mg Oral Daily   atorvastatin 20 mg Oral Nightly   aztreonam 2 g Intravenous Q8H   budesonide-formoterol 2 puff Inhalation BID - RT   busPIRone 5 mg Oral Q12H   cetirizine 5 mg Oral Daily   cholecalciferol 1,000 Units Oral Daily   donepezil 10 mg Oral Nightly   escitalopram 5 mg Oral Daily   ferrous sulfate 325 mg Oral BID With Meals   guaiFENesin 400 mg Oral BID   heparin (porcine) 5,000 Units Subcutaneous Q12H   insulin aspart 0-7 Units Subcutaneous 4x Daily AC & at Bedtime   ipratropium-albuterol 3 mL Nebulization Q6H - RT   ketotifen 1 drop Both Eyes BID   magic barrier cream  Topical 4x Daily   memantine 10 mg Oral BID   methylPREDNISolone sodium succinate 40 mg Intravenous Q12H   metroNIDAZOLE 500 mg Intravenous Q8H   pantoprazole 40 mg Oral BID   potassium phosphate infusion greater than 15 mMoles 30 mmol Intravenous Once   Risaquad-2 1 capsule Oral Daily   sterile water      vancomycin 1,000 mg Intravenous Q18H   vitamin B-12 1,000 mcg Oral Daily     Continuous infusions:    Pharmacy to dose vancomycin        Assessment/Plan     Neuro:  awake, alert and oriented no  concerns.       Respiratory Failure-hypercarbic and hypoxic: likely related to underlying lung disease, COPD, and pneumonia likely related to aspiration. Continue antibiotics. SLP evaluation reviewed, universal aspiration precautions.   Continue BiPap for shortness of breath and HS, will use HFNC during the day, titrate Fi02 to maintain Sp02 >92-94%     Incentive spirometer 10 times an hour. Continue scheduled inhalants and nebulizer's.     Chest xray from this morning reviewed, right pneumothorax noted, will place chest tube.      Cardiac: HR 82, /59 continue continuous ECG and v/s monitoring, maintain MAP >65.     Renal: Creatinine .54 ,  continue strict I&O, electrolytes replaced accordingly.     Hypokalemia: level is 3.3, will give potassium 20 meq PO x1. Recheck level in Am.     Hypophosphatemia: level is 1.7, replaced by primary, recheck level in AM.      GI: continue protonix.      DVT prophylaxis: heparin SQ BID.      IV access: central line in right subclavian 1/22/18.      ID: WBC is 5.75, neutrophils are 88.3, she does have fever, continue vanco, azactam and flagyl. Continue to monitor lab trends and clinical changes. Influenza negative, blood cultures are negative currently.     Patient Active Problem List   Diagnosis Code   • Pneumonia of left lower lobe due to infectious organism J18.1       ROLLY Rocha  01/24/18  10:28 AM      Scribed for Dr. Timmons by ROLLY Florez.        Electronically signed by ROLLY Rocha at 1/24/2018 10:37 AM      Kenroy Timmons MD at 1/24/2018  1:14 PM  Version 1 of 1         Pre-procedure Diagnoses:     Primary spontaneous pneumothorax        Post-procedure Diagnoses:    Primary spontaneous pneumothorax        Procedures:    CHEST TUBE INSERTION               THORACOSTOMY (CHEST TUBE) PLACEMENT- using ultrasound     Indication: Pneumothorax- right sided,     A time-out was completed verifying correct patient, procedure, site,  positioning, and special equipment if applicable.  Consent was taken.      The patient was positioned appropriately for chest tube placement. The patient’s   Right  chest was prepped and draped in sterile fashion. 1% Lidocaine was used to anesthetize the surrounding skin area around the third and fourth rib.  The rib was identified with ultrasound. The catheter with the needle was introduced into the skin and then into the pleural space air bubbles were seen in the syringe with saline. The catheter was introduced and needle was removed.  The chest tube was sutured securely to the skin and a sterile dressing applied. A pleurevac was attached to the chest tube and a chest x-ray obtained.     Estimated Blood Loss: Minimal  The patient tolerated the procedure well and there were no complications.     Chest x-ray ordered.  And seen chest tube is in proper place      Dr. Kenroy Timmons         Electronically signed by Kenroy Timmons MD at 1/24/2018  1:15 PM      Sundar Bueno MD at 1/24/2018  3:28 PM  Version 1 of 1          LOS: 2 days   Patient Care Team:  Hank Cabral MD as PCP - General  Hank Cabral MD as PCP - Family Medicine      Subjective     Admission information:    Ms. Do is a pleasant 82-year-old  female with no history of known coronary artery disease, was admitted on 1/20/2018 after being transferred from a local nursing home with altered mental status.  She was found to be in acute hypoxic/hypercapnic respiratory failure along with left-sided pneumonia and exacerbation of COPD.she is also noted to have mild elevation of troponin up to 0.207 and trending down.  On further questioning Ms. Do gives a history of having had recent left-sided chest pains which she describes as tightness.there are moderate intensity.  There is some ascitic shortness of breath but no sweating.  She currently denies any chest pain or discomfort.  Her EKG revealed normal sinus rhythm with no  significant ischemic changes of myocardial ischemia or infarction.  She denies any previous history of known coronary artery disease or any other heart problems.    Interval History:     According to the patient she is doing better today.  She complains of some abdominal pain but denies any chest pain, shortness breath or palpitations.    History taken from: patient chart family    Vital Signs  Temp:  [97.7 °F (36.5 °C)-98.5 °F (36.9 °C)] 98.2 °F (36.8 °C)  Heart Rate:  [] 83  Resp:  [16-23] 18  BP: (110-159)/(54-92) 110/63    Physical Exam:     Physical Exam   Constitutional: She is oriented to person, place, and time. She appears well-developed and well-nourished.   Elderly white female lying in bed in high flow nasal cannula.   HENT:   Mouth/Throat: Oropharynx is clear and moist.   Eyes: EOM are normal. Pupils are equal, round, and reactive to light.   Neck: Neck supple. No JVD present. No tracheal deviation present. No thyromegaly present.   Cardiovascular: Normal rate, regular rhythm, S1 normal and S2 normal.  Exam reveals no gallop and no friction rub.    No murmur heard.  Pulmonary/Chest: Effort normal. No respiratory distress. She has no wheezes. She has rales.   Abdominal: Soft. Bowel sounds are normal. She exhibits no mass. There is tenderness.   Musculoskeletal: Normal range of motion. She exhibits no edema.   Lymphadenopathy:     She has no cervical adenopathy.   Neurological: She is alert and oriented to person, place, and time.   Skin: Skin is warm and dry. No rash noted.   Psychiatric: She has a normal mood and affect.       Results Review:     I reviewed the patient's new clinical results.  I reviewed the patient's new imaging results and agree with the interpretation.  I reviewed the patient's other test results and agree with the interpretation  I personally viewed and interpreted the patient's EKG/Telemetry data    Medication:  Scheduled Meds:  amLODIPine 5 mg Oral Daily   aspirin 81 mg Oral  Daily   atorvastatin 20 mg Oral Nightly   aztreonam 2 g Intravenous Q8H   budesonide-formoterol 2 puff Inhalation BID - RT   busPIRone 5 mg Oral Q12H   cetirizine 5 mg Oral Daily   cholecalciferol 1,000 Units Oral Daily   donepezil 10 mg Oral Nightly   escitalopram 5 mg Oral Daily   ferrous sulfate 325 mg Oral BID With Meals   guaiFENesin 400 mg Oral BID   heparin (porcine) 5,000 Units Subcutaneous Q12H   insulin aspart 0-7 Units Subcutaneous 4x Daily AC & at Bedtime   ipratropium-albuterol 3 mL Nebulization Q6H - RT   ketotifen 1 drop Both Eyes BID   magic barrier cream  Topical 4x Daily   memantine 10 mg Oral BID   methylPREDNISolone sodium succinate 40 mg Intravenous Q12H   metroNIDAZOLE 500 mg Intravenous Q8H   pantoprazole 40 mg Oral BID   potassium phosphate infusion greater than 15 mMoles 30 mmol Intravenous Once   Risaquad-2 1 capsule Oral Daily   sterile water      vancomycin 1,000 mg Intravenous Q18H   vitamin B-12 1,000 mcg Oral Daily     Continuous Infusions:  Pharmacy to dose vancomycin      PRN Meds:.bisacodyl  •  carboxymethylcellulose  •  dextrose  •  dextrose  •  dextrose  •  dextrose  •  docusate sodium  •  fentaNYL citrate (PF)  •  glucagon (human recombinant)  •  glucagon (human recombinant)  •  HYDROcodone-acetaminophen  •  lactulose  •  magnesium sulfate **OR** magnesium sulfate in D5W 1g/100mL (PREMIX) **OR** magnesium sulfate  •  nitroglycerin  •  ondansetron  •  Pharmacy to dose vancomycin  •  polyethylene glycol  •  potassium chloride **OR** potassium chloride **OR** potassium chloride  •  potassium phosphate infusion greater than 15 mMoles **OR** potassium phosphate infusion greater than 15 mMoles **OR** potassium phosphate **OR** sodium phosphate IVPB **OR** sodium phosphate IVPB **OR** sodium phosphate IVPB  •  sodium chloride  •  sodium chloride    Telemetry: Sinus rhythm in the 80s to 90s.      Assessment/Plan     1. Indeterminate range troponin which is probably due to sepsis from  pneumonia.  2. No history of known coronary artery disease.  3. Acute hypoxic/hypercapnic respiratory failure secondary to exacerbation of COPD from pneumonia.  4. Severe sepsis secondary to pneumonia.     Recommendations:     1.  Troponin elevation: Patient with mild troponin elevation is likely related to sepsis.  However occult ischemia cannot be ruled out.  She'll benefit from a stress test once her general condition improves.  At this point would continue an aspirin and statin.  The patient's echocardiogram showed a normal ejection fraction and no wall motion abnormalities.  She is chest pain-free and her EKG shows no ST-T changes concerning for acute ischemia.    Disposition: The patient appears stable from a cardiology standpoint.  At this point we'll continue to see as needed.  I appreciate the opportunity to participate in this patient's cardiac vascular care.    Sundar Bueno MD  01/24/18  3:28 PM    Dragon disclaimer:  Much of this encounter note is an electronic transcription/translation of spoken language to printed text. The electronic translation of spoken language may permit erroneous, or at times, nonsensical words or phrases to be inadvertently transcribed; Although I have reviewed the note for such errors, some may still exist.     Electronically signed by Sundar Bueno MD at 1/24/2018  3:35 PM      Aroldo Gonsalez DO at 1/25/2018  8:55 AM  Version 1 of 1         Subjective     History:   Amy Do is a 82 y.o. female admitted on 1/22/2018 secondary to Pneumonia of left lower lobe due to infectious organism     Procedures:   1/22/18: Right subclavian central line placement in the ED  1/24/18: Right-sided chest tube placement     Patient seen and examined with ANTWAN Patel. Awake and alert. Continues to report dyspnea. Reports cough with some sputum production. Reports persistent nausea, abdominal pain and diarrhea. Denies CP. Denies vomiting. Repeat CXR  reveals worsening pneumothorax with partial retraction of chest tube. General surgery consulted for chest tube placement.     History taken from: patient, chart, and RN.      Objective     Vital Signs  Temp:  [98.2 °F (36.8 °C)-100.1 °F (37.8 °C)] 98.9 °F (37.2 °C)  Heart Rate:  [] 106  Resp:  [13-20] 16  BP: (110-161)/(54-91) 138/68    Intake/Output Summary (Last 24 hours) at 01/25/18 0855  Last data filed at 01/25/18 0550   Gross per 24 hour   Intake             1100 ml   Output             1525 ml   Net             -425 ml         Physical Exam:  General:    Awake, alert, in no acute distress, chronically ill appearing   Heart:      Normal S1 and S2. Regular rate and rhythm. No significant murmur, rubs or gallops appreciated.   Lungs:     Respirations regular, even and unlabored. Decreased right-sided breath sounds. Left-sided rhonchi overall improved. (+) right-sided chest tube    Abdomen:   Soft. Mild generalized TTP, worse in RENÉE region. No guarding, rebound tenderness or  organomegaly noted. Bowel sounds present x 4.   Extremities:  No clubbing, cyanosis or edema noted. Moves UE and LE equally B/L.     Results Review:      Results from last 7 days  Lab Units 01/25/18 0420 01/24/18 0059 01/23/18 0108 01/22/18  0931   WBC 10*3/mm3 6.42 5.05 5.75 6.21   HEMOGLOBIN g/dL 12.0 11.9* 11.7* 12.5   PLATELETS 10*3/mm3 233 212 207 204       Results from last 7 days  Lab Units 01/25/18 0420 01/24/18 0059 01/23/18 0108 01/22/18  0931   SODIUM mmol/L 143 141 141 135   POTASSIUM mmol/L 4.2 3.3* 3.8 3.6   CHLORIDE mmol/L 109 109 111 105   CO2 mmol/L 29.2 26.0 23.8* 23.0*   BUN mg/dL 14 13 22* 22*   CREATININE mg/dL 0.59 0.54 0.79 1.17   CALCIUM mg/dL 8.8 8.3 8.4 8.7   GLUCOSE mg/dL 109 118* 99 106       Results from last 7 days  Lab Units 01/25/18  0420 01/24/18  0059 01/22/18  0931   BILIRUBIN mg/dL 0.2 0.2 0.3   ALK PHOS U/L 54 63 68   AST (SGOT) U/L 20 22 23   ALT (SGPT) U/L 19 22 20       Results from last 7  days  Lab Units 01/25/18  0420 01/24/18  0059 01/23/18  0658 01/23/18  0108   MAGNESIUM mg/dL 1.9 2.0 2.7* 1.4*           Results from last 7 days  Lab Units 01/23/18  0658 01/23/18  0108 01/22/18  1939   CK TOTAL U/L 229*  229* 264* 276*  276*   TROPONIN I ng/mL 0.098* 0.146* 0.207*   CK MB INDEX % 2.7 2.5 2.1       Imaging Results (last 24 hours)     Procedure Component Value Units Date/Time    XR Chest 1 View [[id number]] Collected:  01/24/18 0856     Updated:  01/24/18 0928    Narrative:       XR CHEST 1 VW-     CLINICAL INDICATION: chest tube placement; J18.1-Lobar pneumonia,  unspecified organism; A41.9-Sepsis, unspecified organism; R41.82-Altered  mental status, unspecified; J96.01-Acute respiratory failure with  hypoxia; J96.02-Acute respiratory failure with hypercapnia          COMPARISON: 1/24/2018      TECHNIQUE: Single frontal view of the chest.     FINDINGS:     Small caliber right-sided thoracostomy tube has been placed. There is  partial reexpansion of the right lung.  The cardiac silhouette is normal. The pulmonary vasculature is  unremarkable.  There is no evidence of an acute osseous abnormality.   There are no suspicious-appearing parenchymal soft tissue nodules.            Impression:       Persistent right-sided pneumothorax. Partial reexpansion. Post  right-sided thoracostomy tube.         This report was finalized on 1/24/2018 8:57 AM by Dr. Fabio Zheng MD.       XR Chest 1 View [[id number]] Collected:  01/24/18 1255     Updated:  01/24/18 1258    Narrative:       XR CHEST 1 VW-     CLINICAL INDICATION: pneumothorax; J18.1-Lobar pneumonia, unspecified  organism; A41.9-Sepsis, unspecified organism; R41.82-Altered mental  status, unspecified; J96.01-Acute respiratory failure with hypoxia;  J96.02-Acute respiratory failure with hypercapnia          COMPARISON: 1/24/2018      TECHNIQUE: Single frontal view of the chest.     FINDINGS:     Continued reexpansion. Now only a small right-sided  pneumothorax is  present  The cardiac silhouette is normal. The pulmonary vasculature is  unremarkable.  There is no evidence of an acute osseous abnormality.   There are no suspicious-appearing parenchymal soft tissue nodules.            Impression:       Small residual right apical pneumothorax         This report was finalized on 1/24/2018 12:56 PM by Dr. Fabio Zheng MD.       XR Chest AP [[id number]] Collected:  01/24/18 0809     Updated:  01/24/18 1411    Narrative:       XR CHEST AP-     CLINICAL INDICATION: Left-sided pneumonia; J18.1-Lobar pneumonia,  unspecified organism; A41.9-Sepsis, unspecified organism; R41.82-Altered  mental status, unspecified; J96.01-Acute respiratory failure with  hypoxia; J96.02-Acute respiratory failure with hypercapnia.          COMPARISON: 01/22/2018      TECHNIQUE: Single frontal view of the chest.     FINDINGS:     Interval development of a large right-sided pneumothorax.  The cardiac silhouette is normal. The pulmonary vasculature is  unremarkable.  There is no evidence of an acute osseous abnormality.   There are no suspicious-appearing parenchymal soft tissue nodules.            Impression:       Interval development of a large right-sided pneumothorax.     I have discussed this with Dr. Gonsalez.         This report was finalized on 1/24/2018 2:09 PM by Dr. Fabio Zheng MD.       XR Chest AP [[id number]] Collected:  01/25/18 0726     Updated:  01/25/18 0729    Narrative:       XR CHEST AP-     CLINICAL INDICATION: Pneumothorax; J18.1-Lobar pneumonia, unspecified  organism; A41.9-Sepsis, unspecified organism; R41.82-Altered mental  status, unspecified; J96.01-Acute respiratory failure with hypoxia;  J96.02-Acute respiratory failure with hypercapnia          COMPARISON: 1/24/2018      TECHNIQUE: Single frontal view of the chest.     FINDINGS:     The right thoracostomy tube appears to have been partially retracted.  There is right-sided pneumothorax, larger than on the  previous exam.  The cardiac silhouette is normal. The pulmonary vasculature is  unremarkable.  There is no evidence of an acute osseous abnormality.   There are no suspicious-appearing parenchymal soft tissue nodules.            Impression:       It appears that the right thoracostomy tube has been partially retracted  and the pneumothorax has become larger since the prior study.         This report was finalized on 1/25/2018 7:27 AM by Dr. Fabio Zheng MD.               Medications:    amLODIPine 5 mg Oral Daily   aspirin 81 mg Oral Daily   atorvastatin 20 mg Oral Nightly   budesonide-formoterol 2 puff Inhalation BID - RT   busPIRone 5 mg Oral Q12H   cetirizine 5 mg Oral Daily   cholecalciferol 1,000 Units Oral Daily   donepezil 10 mg Oral Nightly   escitalopram 5 mg Oral Daily   ferrous sulfate 325 mg Oral BID With Meals   guaiFENesin 400 mg Oral BID   heparin (porcine) 5,000 Units Subcutaneous Q12H   insulin aspart 0-7 Units Subcutaneous 4x Daily AC & at Bedtime   ipratropium-albuterol 3 mL Nebulization Q6H - RT   ketotifen 1 drop Both Eyes BID   magic barrier cream  Topical 4x Daily   memantine 10 mg Oral BID   methylPREDNISolone sodium succinate 20 mg Intravenous Q12H   metroNIDAZOLE 500 mg Intravenous Q8H   pantoprazole 40 mg Oral BID   Risaquad-2 1 capsule Oral Daily   vancomycin 1,000 mg Intravenous Q18H   vitamin B-12 1,000 mcg Oral Daily       Pharmacy to dose vancomycin            Assessment/Plan   Severe sepsis: Likely 2/2 left-sided pneumonia. Currently hemodynamically stable. WBC is stable. Lactic acid is normal. CRP is elevated but improved today. C diff is negative. Cultures with NGTD. Currently on Vanc and Flagy. Pulm has D/C'd Azactam. Cont to follow cultures and repeat labs in the AM.     Acute hypoxic and hypercapnic respiratory failure: Likely multifactorial 2/2 pneumonia, COPD exacerbation and possible oversedation with opioids. Pneumothorax now likely contributing as well. Remains on HFNC.  Cont to monitor closely in the CCU. Pulm input appreciated.     Left-sided pneumonia: Mycoplasma is negative. Legionella ordered but not yet collected. No evidence of aspiration on speech eval. Cont antibiotics as outlined above. Repeat CXR in the AM.     Acute exacerbation of COPD: Cont treatment as outlined above. Cont steroid taper.    Metabolic encephalopathy: CT head negative for any acute abnormalities. Pt's mental status improved with Narcan in the ED. Appears overall improved. Cont treatment as outlined above and supportive treatment.     Right-sided pneumothorax: Chest tube placed yesterday with improvement in pneumothorax. However, repeat CXR this AM reveals worsening pneumothorax with partial retraction of the chest tube. Pulm has consulted general surgery for large bore chest tube placement. Pulm input appreciated.     Indeterminate range troponin elevation: Possibly 2/2 above. Serial CE's have trended downward. Echo reveals an EF of 66-70%, grade I diastolic dysfunction, mild AR, mild AS, mild MR, mild MS, mild TR and moderately elevated RVSP. Cardiology to consider nuclear stress test once her respiratory status improves.  Monitor on telemetry. Cardiology input appreciated.      Arthritis: Concern for medication misuse upon admission. PRN Norco added back now that her mental status has improved to avoid withdrawal.     Nausea, abdominal pain and diarrhea: Stool studies are negative. Cont PPI. CT abd/pelvis on 1/22 revealed sigmoid diverticuli but no evidence of diverticulitis. Pancreatic enzymes are normal. Consult GI for further evaluation.     Electrolyte abnormalities: K+ improved today. PO4 remains low. Cont electrolyte replacement protocols and repeat labs in the AM.     DVT PPX: SQ heparin    Discussed with Carlos Zheng and Iain.     Pt is at high risk 2/2 severe sepsis, resp failure, pneumonia, COPD exacerbation, pneumothorax, metabolic encephalopathy, concern for medication misuse and advanced  age.       Aroldo Gonsalez DO  01/25/18  8:55 AM     Electronically signed by Aroldo Gonsalez DO at 1/25/2018  9:09 AM

## 2018-01-25 NOTE — PROGRESS NOTES
LOS: 3 days     Chief Complaint:  Pulmonology is following for respiratory failure     Subjective     Interval History:     Mrs. Do is doing well this morning, she is lying in bed, no distress noted on exam.     History taken from: patient chart RN    Review of Systems:   Review of Systems   Constitutional: Negative for chills, fatigue and fever.   HENT: Negative for congestion and rhinorrhea.    Eyes: Negative for photophobia and visual disturbance.   Respiratory: Negative for cough, shortness of breath and wheezing.    Cardiovascular: Negative for chest pain and leg swelling.   Gastrointestinal: Negative for abdominal distention and abdominal pain.   Endocrine: Negative for cold intolerance and heat intolerance.   Genitourinary: Negative for difficulty urinating.   Musculoskeletal: Negative for arthralgias and myalgias.   Skin: Negative for color change and pallor.   Allergic/Immunologic: Negative for environmental allergies.   Neurological: Negative for dizziness, weakness and light-headedness.   Psychiatric/Behavioral: Negative for agitation, behavioral problems and confusion.                     Objective     Vital Signs  Temp:  [98.2 °F (36.8 °C)-100.1 °F (37.8 °C)] 98.9 °F (37.2 °C)  Heart Rate:  [] 86  Resp:  [13-18] 18  BP: (110-161)/(54-91) 130/62  Body mass index is 23.08 kg/(m^2).    Intake/Output Summary (Last 24 hours) at 01/25/18 1015  Last data filed at 01/25/18 0550   Gross per 24 hour   Intake             1000 ml   Output             1525 ml   Net             -525 ml          Physical Exam:  GENERAL APPEARANCE: Well developed, well nourished, alert and cooperative, and appears to be in no acute distress.    HEAD: normocephalic.    EYES: PERRL    NECK: Neck supple.     CARDIAC: Normal S1 and S2. No S3, S4 or murmurs. Rhythm is regular. There is no peripheral edema, cyanosis or pallor. Extremities are warm and well perfused. Capillary refill is less than 2 seconds. No carotid  bruits.    Respiratory: Clear to auscultation and percussion without rales, rhonchi, wheezing or diminished breath sounds.    GI: Positive bowel sounds. Soft, nondistended, nontender.     Musculoskeletal: No significant deformity or joint abnormality. No edema. Peripheral pulses intact.     NEUROLOGICAL: Strength and sensation symmetric and intact throughout.     PSYCHIATRIC: The mental examination revealed the patient was oriented to person, place, and time.                 Results Review:                I reviewed the patient's new clinical results.  I reviewed the patient's new imaging results and agree with the interpretation.    Results from last 7 days  Lab Units 01/25/18  0420 01/24/18 0059 01/23/18  0108   WBC 10*3/mm3 6.42 5.05 5.75   HEMOGLOBIN g/dL 12.0 11.9* 11.7*   PLATELETS 10*3/mm3 233 212 207       Results from last 7 days  Lab Units 01/25/18  0420 01/24/18 0059 01/23/18  0658 01/23/18  0108   SODIUM mmol/L 143 141  --  141   POTASSIUM mmol/L 4.2 3.3*  --  3.8   CHLORIDE mmol/L 109 109  --  111   CO2 mmol/L 29.2 26.0  --  23.8*   BUN mg/dL 14 13  --  22*   CREATININE mg/dL 0.59 0.54  --  0.79   CALCIUM mg/dL 8.8 8.3  --  8.4   GLUCOSE mg/dL 109 118*  --  99   MAGNESIUM mg/dL 1.9 2.0 2.7* 1.4*     Lab Results   Component Value Date    INR 0.93 03/06/2017    INR <0.90 10/25/2016    PROTIME 10.3 03/06/2017    PROTIME 10.1 10/25/2016       Results from last 7 days  Lab Units 01/25/18  0420 01/24/18 0059 01/22/18  0931   ALK PHOS U/L 54 63 68   BILIRUBIN mg/dL 0.2 0.2 0.3   ALT (SGPT) U/L 19 22 20   AST (SGOT) U/L 20 22 23       Results from last 7 days  Lab Units 01/25/18  0538   PH, ARTERIAL pH units 7.443   PO2 ART mm Hg 69.5*   PCO2, ARTERIAL mm Hg 42.0   HCO3 ART mmol/L 28.1*     Imaging Results (last 24 hours)     Procedure Component Value Units Date/Time    XR Chest 1 View [[id number]] Collected:  01/24/18 1255     Updated:  01/24/18 1253    Narrative:       XR CHEST 1 VW-     CLINICAL  INDICATION: pneumothorax; J18.1-Lobar pneumonia, unspecified  organism; A41.9-Sepsis, unspecified organism; R41.82-Altered mental  status, unspecified; J96.01-Acute respiratory failure with hypoxia;  J96.02-Acute respiratory failure with hypercapnia          COMPARISON: 1/24/2018      TECHNIQUE: Single frontal view of the chest.     FINDINGS:     Continued reexpansion. Now only a small right-sided pneumothorax is  present  The cardiac silhouette is normal. The pulmonary vasculature is  unremarkable.  There is no evidence of an acute osseous abnormality.   There are no suspicious-appearing parenchymal soft tissue nodules.            Impression:       Small residual right apical pneumothorax         This report was finalized on 1/24/2018 12:56 PM by Dr. Fabio Zheng MD.       XR Chest AP [[id number]] Collected:  01/24/18 0809     Updated:  01/24/18 1411    Narrative:       XR CHEST AP-     CLINICAL INDICATION: Left-sided pneumonia; J18.1-Lobar pneumonia,  unspecified organism; A41.9-Sepsis, unspecified organism; R41.82-Altered  mental status, unspecified; J96.01-Acute respiratory failure with  hypoxia; J96.02-Acute respiratory failure with hypercapnia.          COMPARISON: 01/22/2018      TECHNIQUE: Single frontal view of the chest.     FINDINGS:     Interval development of a large right-sided pneumothorax.  The cardiac silhouette is normal. The pulmonary vasculature is  unremarkable.  There is no evidence of an acute osseous abnormality.   There are no suspicious-appearing parenchymal soft tissue nodules.            Impression:       Interval development of a large right-sided pneumothorax.     I have discussed this with Dr. Gonsalez.         This report was finalized on 1/24/2018 2:09 PM by Dr. Fabio Zheng MD.       XR Chest AP [[id number]] Collected:  01/25/18 0726     Updated:  01/25/18 0729    Narrative:       XR CHEST AP-     CLINICAL INDICATION: Pneumothorax; J18.1-Lobar pneumonia, unspecified  organism;  A41.9-Sepsis, unspecified organism; R41.82-Altered mental  status, unspecified; J96.01-Acute respiratory failure with hypoxia;  J96.02-Acute respiratory failure with hypercapnia          COMPARISON: 1/24/2018      TECHNIQUE: Single frontal view of the chest.     FINDINGS:     The right thoracostomy tube appears to have been partially retracted.  There is right-sided pneumothorax, larger than on the previous exam.  The cardiac silhouette is normal. The pulmonary vasculature is  unremarkable.  There is no evidence of an acute osseous abnormality.   There are no suspicious-appearing parenchymal soft tissue nodules.            Impression:       It appears that the right thoracostomy tube has been partially retracted  and the pneumothorax has become larger since the prior study.         This report was finalized on 1/25/2018 7:27 AM by Dr. Fabio Zheng MD.                Medication Review:   Scheduled Medications:    amLODIPine 5 mg Oral Daily   aspirin 81 mg Oral Daily   atorvastatin 20 mg Oral Nightly   budesonide-formoterol 2 puff Inhalation BID - RT   busPIRone 5 mg Oral Q12H   cetirizine 5 mg Oral Daily   cholecalciferol 1,000 Units Oral Daily   donepezil 10 mg Oral Nightly   escitalopram 5 mg Oral Daily   ferrous sulfate 325 mg Oral BID With Meals   guaiFENesin 400 mg Oral BID   heparin (porcine) 5,000 Units Subcutaneous Q12H   insulin aspart 0-7 Units Subcutaneous 4x Daily AC & at Bedtime   ipratropium-albuterol 3 mL Nebulization Q6H - RT   ketotifen 1 drop Both Eyes BID   magic barrier cream  Topical 4x Daily   memantine 10 mg Oral BID   methylPREDNISolone sodium succinate 20 mg Intravenous Q12H   metroNIDAZOLE 500 mg Intravenous Q8H   pantoprazole 40 mg Oral BID   Risaquad-2 1 capsule Oral Daily   vancomycin 1,000 mg Intravenous Q18H   vitamin B-12 1,000 mcg Oral Daily     Continuous infusions:    Pharmacy to dose vancomycin        Assessment/Plan      Neuro: awak alert and oriented x3.       Respiratory  Failure-hypercarbic and hypoxic: likely related to underlying lung disease, COPD, and pneumonia likely related to aspiration. Continue antibiotics.  universal aspiration precautions.   Continue BiPap for shortness of breath and HS, will use HFNC during the day, titrate Fi02 to maintain Sp02 >92-94%. Today on rounds she is on 54% and 45 L. Medrol decreased to 20mg IV BID.      Incentive spirometer 10 times an hour. Continue scheduled inhalants and nebulizer's.      Chest tube was placed yesterday for pneumothorax, tube has become displaced overnight, will consult surgery for chest tube  placement.       Cardiac: HR 86, /62 continue continuous ECG and v/s monitoring, maintain MAP >65.      Renal: Creatinine .59 ,  continue strict I&O, electrolytes replaced accordingly.      Hypokalemia: resolved, level is 4.2.      Hypophosphatemia: resolved,  level is 1.9.       GI: continue protonix.       DVT prophylaxis: heparin SQ BID.       IV access: central line in right subclavian 1/22/18.       ID: WBC is 6.42 neutrophils are 83.8, no fevers overnight, continue vanco, azactam and flagyl. Continue to monitor lab trends and clinical changes.    Patient Active Problem List   Diagnosis Code   • Pneumonia of left lower lobe due to infectious organism J18.1   • Dislodged gastrostomy tube Z43.1       ROLLY Rocha  01/25/18  10:15 AM      Scribed for Dr. Timmons by ROLLY Florez.     IKenroy M.D. attest that the above note accurately reflects the work and decisions made  by me.  Patient was seen and evaluated by Dr. Timmons, including history of present illness, physical exam, assessment, and treatment plan.  The above note was reviewed and edited by Dr. Timmons. Cc time 32 minutes

## 2018-01-25 NOTE — CONSULTS
01/25/18  Chief Complaint   Patient presents with   • Altered Mental Status     Pt sent from nursing home for altered mental status and difficulty swallowing since last pm.     Amy Do is a 82 y.o. female who presents today at the request of Dr. Gonsalez for nausea, abdominal pain, diarrhea    HPI  The patient was referred for a GI evaluation due to nausea, abdominal pain, and diarrhea.  She was admitted on 1-22 with altered mental status.  The patient is a resident of a local nursing home arrived at the ER with a Butrans patch on her back.  She was given Narcan.  Patient reports that she has been having nausea, vomiting, and fluctuating diarrhea for the past 3-4 months.  She also reports black stools but is taking daily iron.  Patient denies hematochezia.  She explains that her bowel movements are  black at times and green at other times with jelly like consistency.  She is on chronic narcotics due to arthritis.  Her CRP level is elevated at 3.29.  Fecal blood test was negative.  C. difficile, ova and parasites, and stool culture were negative as well.  She reports that her last EGD and colonoscopy were years ago.  She is currently on high flow oxygen during the day and BiPAP machine at night.  CT scan of abdomen and pelvis revealed scattered diverticula  with no evidence of diverticulitis.  Medical, surgical, family, and social histories were reviewed and are listed below.      Review of Systems  History obtained from the patient  General ROS: negative for - chills, fatigue, fever, malaise, weight gain or weight loss  Psychological ROS: negative for - anxiety, behavioral disorder, depression, disorientation, irritability, memory difficulties or suicidal ideation  ENT ROS: negative for - epistaxis, headaches, hearing change, nasal congestion, nasal discharge, oral lesions, sinus pain, sneezing, sore throat, tinnitus, vertigo, visual changes or vocal changes  Hematological and Lymphatic ROS: negative for -  bleeding problems, blood clots, blood transfusions, bruising, fatigue, jaundice, night sweats, pallor, swollen lymph nodes or weight loss  Endocrine ROS: negative for - breast changes, hot flashes, malaise/lethargy, palpitations, polydipsia/polyuria, temperature intolerance or unexpected weight changes  Respiratory ROS: Positive for-dyspnea; negative for - cough, hemoptysis, orthopnea, pleuritic pain,  tachypnea or wheezing  Cardiovascular ROS: negative for - chest pain, dyspnea on exertion, edema, irregular heartbeat, loss of consciousness, murmur, orthopnea, palpitations, paroxysmal nocturnal dyspnea, rapid heart rate or shortness of breath  Gastrointestinal ROS: Positive for-nausea, vomiting, diarrhea, melena; negative for - abdominal pain, appetite loss, blood in stools, change in bowel habits, change in stools, constipation,  gas/bloating, heartburn, hematemesis, stool incontinence or swallowing difficulty/pain  Genito-Urinary ROS: negative for - change in urinary stream, incontinence, nocturia, pelvic pain, scrotal mass/pain, urinary frequency/urgency or vulvar/vaginal symptoms  Musculoskeletal ROS: negative for - gait disturbance, joint pain, joint stiffness, joint swelling, muscle pain or muscular weakness  Neurological ROS: negative for - behavioral changes, bowel and bladder control changes, confusion, dizziness, gait disturbance, headaches, impaired coordination/balance, memory loss, numbness/tingling, seizures, speech problems, tremors, visual changes or weakness  Dermatological ROS: negative for lumps, nail changes, pruritus, rash and skin lesion changes    ACTIVE PROBLEMS:   Specialty Problems     None          PAST MEDICAL HISTORY:  Past Medical History:   Diagnosis Date   • Allergic rhinitis    • Arthritis    • COPD (chronic obstructive pulmonary disease)    • Dementia    • Depression    • Diverticulitis    • Dysphagia    • GERD (gastroesophageal reflux disease)    • Hypertension        SURGICAL  HISTORY:  Past Surgical History:   Procedure Laterality Date   • APPENDECTOMY     • CHOLECYSTECTOMY     • DENTAL PROCEDURE     • HEMORRHOIDECTOMY     • HYSTERECTOMY     • TONSILLECTOMY         FAMILY HISTORY:  Family History   Problem Relation Age of Onset   • No Known Problems Mother    • No Known Problems Father    • No Known Problems Sister    • No Known Problems Brother    • No Known Problems Son    • No Known Problems Daughter    • No Known Problems Maternal Grandmother    • No Known Problems Maternal Grandfather    • No Known Problems Paternal Grandmother    • No Known Problems Paternal Grandfather    • No Known Problems Cousin    • Rheum arthritis Neg Hx    • Osteoarthritis Neg Hx    • Asthma Neg Hx    • Diabetes Neg Hx    • Heart failure Neg Hx    • Hyperlipidemia Neg Hx    • Hypertension Neg Hx    • Migraines Neg Hx    • Rashes / Skin problems Neg Hx    • Seizures Neg Hx    • Stroke Neg Hx    • Thyroid disease Neg Hx        SOCIAL HISTORY:  Social History   Substance Use Topics   • Smoking status: Former Smoker     Types: Cigarettes   • Smokeless tobacco: Not on file   • Alcohol use No       CURRENT MEDICATION:    Current Facility-Administered Medications:   •  amLODIPine (NORVASC) tablet 5 mg, 5 mg, Oral, Daily, Vandana Jansen PA-C, 5 mg at 01/25/18 0828  •  aspirin EC tablet 81 mg, 81 mg, Oral, Daily, Mark Anthony Broussard MD, Stopped at 01/25/18 0832  •  atorvastatin (LIPITOR) tablet 20 mg, 20 mg, Oral, Nightly, Macho Cruz MD, 20 mg at 01/24/18 2034  •  bisacodyl (DULCOLAX) suppository 10 mg, 10 mg, Rectal, Daily PRN, Vandana Jansen PA-C  •  budesonide-formoterol (SYMBICORT) 160-4.5 MCG/ACT inhaler 2 puff, 2 puff, Inhalation, BID - RT, Vandana Jansen PA-C, 2 puff at 01/25/18 0717  •  busPIRone (BUSPAR) tablet 5 mg, 5 mg, Oral, Q12H, Vandana Jansen PA-C, 5 mg at 01/25/18 0828  •  carboxymethylcellulose (REFRESH PLUS) 0.5 % ophthalmic solution 1 drop, 1 drop, Both Eyes, Q4H  PRN, Vandana Jansen PA-C  •  cetirizine (zyrTEC) tablet 5 mg, 5 mg, Oral, Daily, Vandana Jansen PA-C, 5 mg at 01/25/18 0828  •  cholecalciferol (VITAMIN D3) tablet 1,000 Units, 1,000 Units, Oral, Daily, Vandana Jansen PA-C, 1,000 Units at 01/25/18 0828  •  dextrose (D50W) solution 25 g, 25 g, Intravenous, Q15 Min PRN, Aroldo Gonsalez, DO  •  dextrose (D50W) solution 25 g, 25 g, Intravenous, Q15 Min PRN, Aroldo Gonsaelz, DO  •  dextrose (GLUTOSE) oral gel 15 g, 15 g, Oral, Q15 Min PRN, Aroldo Gonsalez, DO  •  dextrose (GLUTOSE) oral gel 15 g, 15 g, Oral, Q15 Min PRN, Aroldo Gonsalez, DO  •  docusate sodium (COLACE) capsule 200 mg, 200 mg, Oral, BID PRN, Vandana Jansen PA-C  •  donepezil (ARICEPT) tablet 10 mg, 10 mg, Oral, Nightly, Vandana Jansen PA-C, 10 mg at 01/24/18 2035  •  escitalopram (LEXAPRO) tablet 5 mg, 5 mg, Oral, Daily, Vandana Jansen PA-C, 5 mg at 01/25/18 0827  •  fentaNYL citrate (PF) (SUBLIMAZE) injection 100 mcg, 100 mcg, Intravenous, Q1H PRN, Kenroy Timmons MD  •  ferrous sulfate tablet 325 mg, 325 mg, Oral, BID With Meals, Vandana Jansen PA-C, 325 mg at 01/25/18 0828  •  glucagon (human recombinant) (GLUCAGEN DIAGNOSTIC) injection 1 mg, 1 mg, Subcutaneous, Q15 Min PRN, Aroldo Gonsalez, DO  •  glucagon (human recombinant) (GLUCAGEN DIAGNOSTIC) injection 1 mg, 1 mg, Subcutaneous, Q15 Min PRN, Aroldo Gonsalez, DO  •  guaiFENesin tablet 400 mg, 400 mg, Oral, BID, Vandana Jansen PA-C, 400 mg at 01/25/18 0827  •  heparin (porcine) 5000 UNIT/ML injection 5,000 Units, 5,000 Units, Subcutaneous, Q12H, Aroldo Gonsalez DO, Stopped at 01/25/18 0832  •  HYDROcodone-acetaminophen (NORCO) 5-325 MG per tablet 1 tablet, 1 tablet, Oral, Q6H PRN, Finn Benitez MD, 1 tablet at 01/24/18 1246  •  insulin aspart (novoLOG) injection 0-7 Units, 0-7 Units, Subcutaneous, 4x Daily AC & at Bedtime, Aroldo Gonsalez DO, 2  Units at 01/25/18 1109  •  ipratropium-albuterol (DUO-NEB) nebulizer solution 3 mL, 3 mL, Nebulization, Q6H - RT, Mark Anthony Broussard MD, 3 mL at 01/25/18 0716  •  ketotifen (ZADITOR) 0.025 % ophthalmic solution 1 drop, 1 drop, Both Eyes, BID, Vandana Jansen PA-C, 1 drop at 01/25/18 0830  •  lactulose (CHRONULAC) 10 GM/15ML solution 10 g, 10 g, Oral, Daily PRN, Vandana Jansen PA-C  •  lidocaine (XYLOCAINE) 1 % injection  - ADS Override Pull, , , ,   •  lidocaine (XYLOCAINE) 1 % injection 30 mL, 30 mL, Subcutaneous, Once, Grupo Becerril MD  •  magic barrier cream, , Topical, 4x Daily, Mark Anthony Broussard MD  •  magnesium sulfate 4 gram infusion - Mg less than or equal to 1mg/dL, 4 g, Intravenous, PRN **OR** magnesium sulfate 3 gram infusion (1gm x 3) - Mg 1.1 - 1.5 mg/dL, 1 g, Intravenous, PRN **OR** Magnesium Sulfate 2 gram infusion- Mg 1.6 - 1.9 mg/dL, 2 g, Intravenous, PRN, Mark Anthony Broussard MD  •  memantine (NAMENDA) tablet 10 mg, 10 mg, Oral, BID, Vandana Jansen PA-C, 10 mg at 01/25/18 0827  •  methylPREDNISolone sodium succinate (SOLU-Medrol) injection 20 mg, 20 mg, Intravenous, Q12H, Kenroy Timmons MD, 20 mg at 01/25/18 0826  •  metroNIDAZOLE (FLAGYL) IVPB 500 mg, 500 mg, Intravenous, Q8H, Aroldo Gonsalez DO, 500 mg at 01/25/18 0546  •  nitroglycerin (NITROSTAT) SL tablet 0.4 mg, 0.4 mg, Sublingual, Q5 Min PRN, Vandana Jansen PA-C  •  ondansetron (ZOFRAN) tablet 4 mg, 4 mg, Oral, Q6H PRN, Vandana Jansen PA-C, 4 mg at 01/25/18 0644  •  pantoprazole (PROTONIX) EC tablet 40 mg, 40 mg, Oral, BID, Vandana Jansen PA-C, 40 mg at 01/25/18 0827  •  Pharmacy to dose vancomycin, , Does not apply, Continuous PRN, Aroldo Gonsalez, DO  •  polyethylene glycol (MIRALAX) powder 17 g, 17 g, Oral, Daily PRN, Vandana Jansen PA-C  •  potassium chloride (MICRO-K) CR capsule 40 mEq, 40 mEq, Oral, PRN **OR** potassium chloride (KLOR-CON) packet 40 mEq, 40 mEq,  Oral, PRN **OR** potassium chloride 10 mEq in 100 mL IVPB, 10 mEq, Intravenous, Q1H PRN, Aroldo Gonsalez DO  •  potassium phosphate 45 mmol in sodium chloride 0.9 % 500 mL infusion, 45 mmol, Intravenous, PRN **OR** potassium phosphate 30 mmol in sodium chloride 0.9 % 250 mL infusion, 30 mmol, Intravenous, PRN **OR** potassium phosphate 15 mmol in sodium chloride 0.9 % 100 mL infusion, 15 mmol, Intravenous, PRN **OR** sodium phosphates 45 mmol in sodium chloride 0.9 % 500 mL IVPB, 45 mmol, Intravenous, PRN **OR** sodium phosphates 30 mmol in sodium chloride 0.9 % 250 mL IVPB, 30 mmol, Intravenous, PRN **OR** sodium phosphates 15 mmol in sodium chloride 0.9 % 250 mL IVPB, 15 mmol, Intravenous, PRN, Aroldo Gonsalez, DO  •  Risaquad-2 capsule 1 capsule, 1 capsule, Oral, Daily, Vandana Jansen PA-C, 1 capsule at 01/25/18 0827  •  sodium chloride 0.9 % flush 1-10 mL, 1-10 mL, Intravenous, PRN, Aroldo Gonsalez,   •  sodium chloride 0.9 % flush 10 mL, 10 mL, Intravenous, PRN, Sander Castro MD  •  vancomycin (VANCOCIN) 1,000 mg in sodium chloride 0.9 % 250 mL IVPB, 1,000 mg, Intravenous, Q18H, Aroldo Gonsalez DO, 1,000 mg at 01/24/18 2139  •  vitamin B-12 (CYANOCOBALAMIN) tablet 1,000 mcg, 1,000 mcg, Oral, Daily, Vandana Jansen PA-C, 1,000 mcg at 01/25/18 0829    ALLERGIES:  Contrast dye; Penicillins; and Sulfa antibiotics     VITAL SIGNS:  Vital Signs (last 24 hours)       01/24 0700  -  01/25 0659 01/25 0700  -  01/25 1311   Most Recent    Temp (°F) 98.1 -  100.1    98.6 -  98.9     98.6 (37)    Heart Rate 75 -  99    76 -  106     97    Resp 13 -  20    16 -  18     16    /63 -  161/85    113/58 -  152/79     122/67    SpO2 (%) 92 -  100    93 -  99     96          PHYSICAL EXAMINATION    General Appearance:    Alert, cooperative, in no acute distress; pallor, ill-appearing    Head:    Normocephalic, without obvious abnormality, atraumatic   Eyes:            Lids and lashes  normal, conjunctivae and sclerae normal, no   icterus, no pallor, corneas clear, PERRLA   Ears:    Ears appear intact with no abnormalities noted   Throat:   No oral lesions, no thrush, oral mucosa moist   Neck:   No adenopathy, supple, trachea midline, no thyromegaly, no   carotid bruit, no JVD   Back:     No kyphosis present, no scoliosis present, no skin lesions,      erythema or scars, no tenderness to percussion or                   palpation,   range of motion normal   Lungs:     Rhonchi, respirations regular, even and   unlabored    Heart:    Regular rhythm and normal rate, normal S1 and S2, no            murmur, no gallop, no rub, no click   Chest Wall:    No abnormalities observed   Abdomen:     Right upper quadrant tenderness; Normal bowel sounds, no masses, no organomegaly, soft  , non-distended, no guarding, no rebound tenderness   Rectal:     Deferred   Extremities:   Moves all extremities well, no edema, no cyanosis, no             redness   Pulses:   Pulses palpable and equal bilaterally   Skin:   Pallor; No bleeding, bruising or rash   Lymph nodes:   No palpable adenopathy   Neurologic:   Cranial nerves 2 - 12 grossly intact, sensation intact, DTR       present and equal bilaterally       LABS  Lab Results (last 24 hours)     Procedure Component Value Units Date/Time    Blood Culture - Blood, [[id number]]  (Normal) Collected:  01/22/18 0931    Specimen:  Blood from Blood, Central Line Updated:  01/24/18 1316     Blood Culture No growth at 2 days    Blood Culture - Blood, [[id number]]  (Normal) Collected:  01/22/18 1513    Specimen:  Blood from Hand, Left Updated:  01/24/18 1716     Blood Culture No growth at 2 days    POC Glucose Once [[id number]]  (Normal) Collected:  01/24/18 1717    Specimen:  Blood Updated:  01/24/18 1724     Glucose 100 mg/dL     Narrative:       Meter: [id number] : [id number] DEMAR EDMONDSON    CBC & Differential [[id number]] Collected:  01/25/18 0420    Specimen:  Blood Updated:   01/25/18 0433    Narrative:       The following orders were created for panel order CBC & Differential.  Procedure                               Abnormality         Status                     ---------                               -----------         ------                     CBC Auto Differential[[id number]]        Abnormal            Final result                 Please view results for these tests on the individual orders.    CBC Auto Differential [[id number]]  (Abnormal) Collected:  01/25/18 0420    Specimen:  Blood Updated:  01/25/18 0433     WBC 6.42 10*3/mm3      RBC 4.24 10*6/mm3      Hemoglobin 12.0 g/dL      Hematocrit 38.3 %      MCV 90.3 fL      MCH 28.3 pg      MCHC 31.3 (L) g/dL      RDW 14.3 %      RDW-SD 45.7 fl      MPV 8.8 fL      Platelets 233 10*3/mm3      Neutrophil % 83.8 (H) %      Lymphocyte % 8.6 (L) %      Monocyte % 7.3 %      Eosinophil % 0.0 %      Basophil % 0.0 %      Immature Grans % 0.3 %      Neutrophils, Absolute 5.38 10*3/mm3      Lymphocytes, Absolute 0.55 (L) 10*3/mm3      Monocytes, Absolute 0.47 10*3/mm3      Eosinophils, Absolute 0.00 10*3/mm3      Basophils, Absolute 0.00 10*3/mm3      Immature Grans, Absolute 0.02 10*3/mm3     Magnesium [[id number]]  (Normal) Collected:  01/25/18 0420    Specimen:  Blood Updated:  01/25/18 0454     Magnesium 1.9 mg/dL     Phosphorus [[id number]]  (Abnormal) Collected:  01/25/18 0420    Specimen:  Blood Updated:  01/25/18 0454     Phosphorus 2.0 (L) mg/dL     Comprehensive Metabolic Panel [[id number]]  (Abnormal) Collected:  01/25/18 0420    Specimen:  Blood Updated:  01/25/18 0502     Glucose 109 mg/dL      BUN 14 mg/dL      Creatinine 0.59 mg/dL      Sodium 143 mmol/L      Potassium 4.2 mmol/L      Chloride 109 mmol/L      CO2 29.2 mmol/L      Calcium 8.8 mg/dL      Total Protein 5.5 (L) g/dL      Albumin 3.20 (L) g/dL      ALT (SGPT) 19 U/L      AST (SGOT) 20 U/L      Alkaline Phosphatase 54 U/L       Note New Reference Ranges        Total  Bilirubin 0.2 mg/dL      eGFR Non African Amer 98 mL/min/1.73      Globulin 2.3 gm/dL      A/G Ratio 1.4 (L) g/dL      BUN/Creatinine Ratio 23.7     Anion Gap 4.8 mmol/L     Narrative:       The MDRD GFR formula is only valid for adults with stable renal function between ages 18 and 70.    Osmolality, Calculated [[id number]]  (Normal) Collected:  01/25/18 0420    Specimen:  Blood Updated:  01/25/18 0502     Osmolality Calc 286.0 mOsm/kg     C-reactive Protein [[id number]]  (Abnormal) Collected:  01/25/18 0420    Specimen:  Blood Updated:  01/25/18 0524     C-Reactive Protein 3.28 (H) mg/dL     Blood Gas, Arterial [[id number]]  (Abnormal) Collected:  01/25/18 0538    Specimen:  Arterial Blood Updated:  01/25/18 0545     Site Arterial: right radial     Ramirez's Test Positive     pH, Arterial 7.443 pH units      pCO2, Arterial 42.0 mm Hg      pO2, Arterial 69.5 (L) mm Hg      HCO3, Arterial 28.1 (H) mmol/L      Base Excess, Arterial 3.6 mmol/L      O2 Saturation, Arterial 94.5 %      Hemoglobin, Blood Gas 12.5 g/dL      Hematocrit, Blood Gas 37.0 %      Oxyhemoglobin 92.7 %      Methemoglobin 0.30 %      Carboxyhemoglobin 1.6 %      A-a Gradiant 231.2 mmHg      Temperature 98.6 C      Barometric Pressure for Blood Gas 729 mmHg      Modality HFNC     FIO2 51 %     POC Glucose Once [[id number]]  (Normal) Collected:  01/25/18 0621    Specimen:  Blood Updated:  01/25/18 0637     Glucose 104 mg/dL     Narrative:       Meter: [id number] : [id number] Tresata    POC Glucose Once [[id number]]  (Normal) Collected:  01/24/18 2047    Specimen:  Blood Updated:  01/25/18 0755     Glucose 91 mg/dL     Narrative:       Meter: [id number] : [id number] mcTELHANIE    Legionella Antigen, Urine - Urine, Urine, Clean Catch [[id number]]  (Normal) Collected:  01/25/18 0908    Specimen:  Urine from Urine, Catheter Updated:  01/25/18 1026     LEGIONELLA ANTIGEN, URINE Negative    Narrative:         Presumptive negative for L.  pneumophilia serogroup 1 antigen, suggesting no recent or current infection.    POC Glucose Once [[id number]]  (Abnormal) Collected:  01/25/18 1031    Specimen:  Blood Updated:  01/25/18 1037     Glucose 166 (H) mg/dL     Narrative:       Meter: [id number] : [id number] BENZ ScholarPRO          IMAGING  Imaging Results (last 72 hours)     Procedure Component Value Units Date/Time    XR Chest 1 View [[id number]] Collected:  01/23/18 0835     Updated:  01/23/18 0908    Narrative:       XR CHEST 1 VIEW-     CLINICAL INDICATION: Acute respiratory failure, s/p central line;  J18.1-Lobar pneumonia, unspecified organism; A41.9-Sepsis, unspecified  organism; R41.82-Altered mental status, unspecified; J96.01-Acute  respiratory failure with hypoxia; J96.02-Acute respiratory failure with  hypercapnia.          COMPARISON: 10/25/2016.      TECHNIQUE: Single frontal view of the chest.     FINDINGS:     Right-sided central line is present. The patient is rotated. There is no  pneumothorax.  Right basilar atelectasis or pneumonia.  There is no evidence of an acute osseous abnormality.   There are no suspicious-appearing parenchymal soft tissue nodules.            Impression:       Right basilar atelectasis or pneumonia.         This report was finalized on 1/23/2018 9:06 AM by Dr. Fabio Zheng MD.       [facility] Fiberoptic Endo (fees) [[id number]] Updated:  01/23/18 1034    XR Chest 1 View [[id number]] Collected:  01/23/18 1550     Updated:  01/23/18 1605    Narrative:       XR CHEST 1 VIEW-     CLINICAL INDICATION: Simple Sepsis triage protocol          COMPARISON: 10/25/2016.      TECHNIQUE: Single frontal view of the chest.     FINDINGS:     Right-sided central line tip appears to be in the region of the  cavoatrial junction.  Possible right basilar consolidation.  There is no evidence of an acute osseous abnormality.   There are no suspicious-appearing parenchymal soft tissue nodules.            Impression:       Equivocal right  basilar consolidation.         This report was finalized on 1/23/2018 4:03 PM by Dr. Fabio Zheng MD.       XR Chest 1 View [[id number]] Collected:  01/24/18 0856     Updated:  01/24/18 0928    Narrative:       XR CHEST 1 VW-     CLINICAL INDICATION: chest tube placement; J18.1-Lobar pneumonia,  unspecified organism; A41.9-Sepsis, unspecified organism; R41.82-Altered  mental status, unspecified; J96.01-Acute respiratory failure with  hypoxia; J96.02-Acute respiratory failure with hypercapnia          COMPARISON: 1/24/2018      TECHNIQUE: Single frontal view of the chest.     FINDINGS:     Small caliber right-sided thoracostomy tube has been placed. There is  partial reexpansion of the right lung.  The cardiac silhouette is normal. The pulmonary vasculature is  unremarkable.  There is no evidence of an acute osseous abnormality.   There are no suspicious-appearing parenchymal soft tissue nodules.            Impression:       Persistent right-sided pneumothorax. Partial reexpansion. Post  right-sided thoracostomy tube.         This report was finalized on 1/24/2018 8:57 AM by Dr. Fabio Zheng MD.       XR Chest 1 View [[id number]] Collected:  01/24/18 1255     Updated:  01/24/18 1258    Narrative:       XR CHEST 1 VW-     CLINICAL INDICATION: pneumothorax; J18.1-Lobar pneumonia, unspecified  organism; A41.9-Sepsis, unspecified organism; R41.82-Altered mental  status, unspecified; J96.01-Acute respiratory failure with hypoxia;  J96.02-Acute respiratory failure with hypercapnia          COMPARISON: 1/24/2018      TECHNIQUE: Single frontal view of the chest.     FINDINGS:     Continued reexpansion. Now only a small right-sided pneumothorax is  present  The cardiac silhouette is normal. The pulmonary vasculature is  unremarkable.  There is no evidence of an acute osseous abnormality.   There are no suspicious-appearing parenchymal soft tissue nodules.            Impression:       Small residual right apical pneumothorax          This report was finalized on 1/24/2018 12:56 PM by Dr. Fabio Zheng MD.       XR Chest AP [[id number]] Collected:  01/24/18 0809     Updated:  01/24/18 1411    Narrative:       XR CHEST AP-     CLINICAL INDICATION: Left-sided pneumonia; J18.1-Lobar pneumonia,  unspecified organism; A41.9-Sepsis, unspecified organism; R41.82-Altered  mental status, unspecified; J96.01-Acute respiratory failure with  hypoxia; J96.02-Acute respiratory failure with hypercapnia.          COMPARISON: 01/22/2018      TECHNIQUE: Single frontal view of the chest.     FINDINGS:     Interval development of a large right-sided pneumothorax.  The cardiac silhouette is normal. The pulmonary vasculature is  unremarkable.  There is no evidence of an acute osseous abnormality.   There are no suspicious-appearing parenchymal soft tissue nodules.            Impression:       Interval development of a large right-sided pneumothorax.     I have discussed this with Dr. Gonsalez.         This report was finalized on 1/24/2018 2:09 PM by Dr. Fabio Zheng MD.       XR Chest AP [[id number]] Collected:  01/25/18 0726     Updated:  01/25/18 0729    Narrative:       XR CHEST AP-     CLINICAL INDICATION: Pneumothorax; J18.1-Lobar pneumonia, unspecified  organism; A41.9-Sepsis, unspecified organism; R41.82-Altered mental  status, unspecified; J96.01-Acute respiratory failure with hypoxia;  J96.02-Acute respiratory failure with hypercapnia          COMPARISON: 1/24/2018      TECHNIQUE: Single frontal view of the chest.     FINDINGS:     The right thoracostomy tube appears to have been partially retracted.  There is right-sided pneumothorax, larger than on the previous exam.  The cardiac silhouette is normal. The pulmonary vasculature is  unremarkable.  There is no evidence of an acute osseous abnormality.   There are no suspicious-appearing parenchymal soft tissue nodules.            Impression:       It appears that the right thoracostomy tube has been  partially retracted  and the pneumothorax has become larger since the prior study.         This report was finalized on 1/25/2018 7:27 AM by Dr. Fabio Zheng MD.             Assessment/Plan   -Nausea and vomiting  -Abdominal pain  -Diarrhea  -Black stools  -History of gastrostomy tube  -Chronic narcotic use  -Severe sepsis  -Acute respiratory failure  -Left-sided pneumonia-acute exacerbation of COPD  -Metallic encephalopathy  -Right-sided pneumothorax  -Dementia  -History of GERD  -History of colonic diverticulosis    Imaging was reviewed  to rule out opioid induced constipation with overflow diarrhea.  Radiologist reported no constipation.  Stool cultures, C. difficile, and ova and parasites tests were negative.  We will follow and provide conservative treatment.  Concur with present management.  Patient may eventually need endoscopic examinations when respiratory status improves.  Thank you for allowing us to participate in the care of your patient.    Patient was seen and evaluated by Dr. Wilkins including history of present illness, physical examination, assessment and treatment plan.  The note above was reviewed by Dr. Wilkins -- agree with all findings and recommendations.        Electronically signed by: NATY Chacon     CC:  Dr. Aroldo Gonsalez

## 2018-01-25 NOTE — THERAPY TREATMENT NOTE
Acute Care - Physical Therapy Treatment Note   [place]     Patient Name: Amy Do  : 1935  MRN: [id number]  Today's Date: 2018  Onset of Illness/Injury or Date of Surgery Date: 18  Date of Referral to PT: 18  Referring Physician: Dr. Broussard    Admit Date: 2018    Visit Dx:    ICD-10-CM ICD-9-CM   1. Dislodged gastrostomy tube Z43.1 V55.1   2. Pneumonia of left lower lobe due to infectious organism J18.1 486   3. Sepsis, due to unspecified organism A41.9 038.9     995.91   4. Altered mental status, unspecified altered mental status type R41.82 780.97   5. Acute respiratory failure with hypoxia and hypercapnia J96.01 518.81    J96.02    6. Malfunction of gastrostomy tube K94.23 536.42     Patient Active Problem List   Diagnosis   • Pneumonia of left lower lobe due to infectious organism   • Dislodged gastrostomy tube               Adult Rehabilitation Note       18 1426          Rehab Assessment/Intervention    Discipline physical therapist  -BC      Document Type therapy note (daily note)  -BC      Subjective Information complains of;fatigue;weakness  -BC      Patient Effort, Rehab Treatment fair  -BC      Recorded by [BC] Josephine Mckeon, PT      Pain Assessment    Pain Assessment No/denies pain  -BC      Recorded by [BC] Josephine Mckeon PT      Cognitive Assessment/Intervention    Current Cognitive/Communication Assessment impaired  -BC      Orientation Status oriented to;person;situation  -BC      Follows Commands/Answers Questions able to follow single-step instructions;75% of the time  -BC      Personal Safety decreased awareness, need for assist;decreased awareness, need for safety;decreased insight to deficits  -BC      Personal Safety Interventions fall prevention program maintained;gait belt;muscle strengthening facilitated;nonskid shoes/slippers when out of bed  -BC      Recorded by [BC] Josephine Mckeon PT      Bed Mobility, Assessment/Treatment    Bed Mobility,  Assistive Device bed rails  -BC      Bed Mob, Supine to Sit, [place] verbal cues required;moderate assist (50% patient effort);2 person assist required  -BC      Bed Mob, Sit to Supine, [place] verbal cues required;moderate assist (50% patient effort);2 person assist required  -BC      Recorded by [BC] Josephine Mckeon, PT      Transfer Assessment/Treatment    Transfers, Sit-Stand [place] unable to perform  -BC      Transfers, Stand-Sit [place] unable to perform  -BC      Recorded by [BC] Josephine Mckeon, PT      Gait Assessment/Treatment    Gait, [place] Level unable to perform  -BC      Recorded by [BC] Josephine Mckeon, PT      Positioning and Restraints    Pre-Treatment Position in bed  -BC      Post Treatment Position bed  -BC      In Bed notified nsg;supine;call light within reach;encouraged to call for assist;side rails up x3  -BC      Recorded by [BC] Josephine Mckeon, PT        User Key  (r) = Recorded By, (t) = Taken By, (c) = Cosigned By    Initials Name Effective Dates    BC Josephine Mckeon, PT 03/14/16 -                 IP PT Goals       01/23/18 1828 01/23/18 1827       Bed Mobility PT LTG    Bed Mobility PT LTG, Date Established  01/23/18  -BC     Bed Mobility PT LTG, Time to Achieve  by discharge  -BC     Bed Mobility PT LTG, Activity Type  all bed mobility  -BC     Bed Mobility PT LTG, [place] Level  contact guard assist  -BC     Bed Mobility PT Goal  LTG, Assist Device  bed rails  -BC     Transfer Training PT LTG    Transfer Training PT LTG, Date Established  01/23/18  -BC     Transfer Training PT LTG, Time to Achieve  by discharge  -BC     Transfer Training PT LTG, Activity Type  all transfers  -BC     Transfer Training PT LTG, [place] Level  contact guard assist  -BC     Transfer Training PT LTG, Assist Device  walker, rolling  -BC     Gait Training PT LTG    Gait Training Goal PT LTG, Date Established 01/23/18  -BC 01/23/18  -BC     Gait Training Goal PT LTG,  Time to Achieve by discharge  -BC by discharge  -BC     Gait Training Goal PT LTG, [place] Level contact guard assist  -BC contact guard assist  -BC     Gait Training Goal PT LTG, Assist Device walker, rolling  -BC walker, rolling  -BC     Gait Training Goal PT LTG, Distance to Achieve 80  -BC 80  -BC       User Key  (r) = Recorded By, (t) = Taken By, (c) = Cosigned By    Initials Name Provider Type    BC Josephine Mckeon PT Physical Therapist          Physical Therapy Education     Title: PT OT SLP Therapies (Done)     Topic: Physical Therapy (Done)     Point: Mobility training (Done)    Learning Progress Summary    Learner Readiness Method Response Comment Documented by Status   Patient Acceptance [facility] 01/25/18 1613 Done    Acceptance E [facility] 01/23/18 1826 Done               Point: Home exercise program (Done)    Learning Progress Summary    Learner Readiness Method Response Comment Documented by Status   Patient Acceptance E [facility] 01/25/18 1613 Done    Acceptance E [facility] 01/23/18 1826 Done               Point: Body mechanics (Done)    Learning Progress Summary    Learner Readiness Method Response Comment Documented by Status   Patient Acceptance E [facility] 01/25/18 1613 Done    Acceptance E [facility] 01/23/18 1826 Done               Point: Precautions (Done)    Learning Progress Summary    Learner Readiness Method Response Comment Documented by Status   Patient Acceptance E [facility] 01/25/18 1613 Done    Acceptance E [facility] 01/23/18 1826 Done                      User Key     Initials Effective Dates Name Provider Type Discipline    BC 03/14/16 -  Josephine Mckeon PT Physical Therapist PT                    PT Recommendation and Plan  Planned Therapy Interventions: balance training, bed mobility training, gait training, home exercise program, patient/family education, strengthening, transfer training  PT Frequency: 3-5 times/wk, per priority policy             Outcome Measures       01/25/18 1600 01/23/18  1800       How much help from another person do you currently need...    Turning from your back to your side while in flat bed without using bedrails? 3  -BC 3  -BC     Moving from lying on back to sitting on the side of a flat bed without bedrails? 3  -BC 3  -BC     Moving to and from a bed to a chair (including a wheelchair)? 2  -BC 2  -BC     Standing up from a chair using your arms (e.g., wheelchair, bedside chair)? 3  -BC 3  -BC     Climbing 3-5 steps with a railing? 1  -BC 1  -BC     To walk in hospital room? 3  -BC 3  -BC     AM-PAC 6 Clicks Score 15  -BC 15  -BC     Functional Assessment    Outcome Measure Options AM-PAC 6 Clicks Basic Mobility (PT)  -BC AM-PAC 6 Clicks Basic Mobility (PT)  -BC       User Key  (r) = Recorded By, (t) = Taken By, (c) = Cosigned By    Initials Name Provider Type    ROBERT Mckeon PT Physical Therapist           Time Calculation:         PT Charges       01/25/18 1614          Time Calculation    Start Time --   30  -BC      PT Received On 01/25/18  -BC        User Key  (r) = Recorded By, (t) = Taken By, (c) = Cosigned By    Initials Name Provider Type    BC Josephine Mckeon PT Physical Therapist          Therapy Charges for Today     Code Description Service Date Service Provider Modifiers Qty    20679739649  PT THERAPEUTIC ACT EA 15 MIN 1/25/2018 Josephine Mckeon, PT GP 1    39966560344 HC PT THER PROC EA 15 MIN 1/25/2018 Josephine Mckeon, PT GP 1    86962006113 HC PT THER SUPP EA 15 MIN 1/25/2018 Josephine Mckeon, PT GP 2          PT G-Codes  Outcome Measure Options: AM-PAC 6 Clicks Basic Mobility (PT)  Score: 15  Functional Limitation: Mobility: Walking and moving around  Mobility: Walking and Moving Around Current Status (): At least 40 percent but less than 60 percent impaired, limited or restricted  Mobility: Walking and Moving Around Goal Status (): At least 20 percent but less than 40 percent impaired, limited or restricted    Josephine Mckeon  PT  1/25/2018

## 2018-01-26 ENCOUNTER — APPOINTMENT (OUTPATIENT)
Dept: GENERAL RADIOLOGY | Facility: HOSPITAL | Age: 83
End: 2018-01-26

## 2018-01-26 LAB
ALBUMIN SERPL-MCNC: 3.1 G/DL (ref 3.4–4.8)
ALBUMIN/GLOB SERPL: 1.4 G/DL (ref 1.5–2.5)
ALP SERPL-CCNC: 51 U/L (ref 35–104)
ALT SERPL W P-5'-P-CCNC: 23 U/L (ref 10–36)
ANION GAP SERPL CALCULATED.3IONS-SCNC: 2.7 MMOL/L (ref 3.6–11.2)
AST SERPL-CCNC: 21 U/L (ref 10–30)
BASOPHILS # BLD AUTO: 0 10*3/MM3 (ref 0–0.3)
BASOPHILS NFR BLD AUTO: 0 % (ref 0–2)
BILIRUB SERPL-MCNC: 0.4 MG/DL (ref 0.2–1.8)
BUN BLD-MCNC: 13 MG/DL (ref 7–21)
BUN/CREAT SERPL: 25.5 (ref 7–25)
CALCIUM SPEC-SCNC: 8.5 MG/DL (ref 7.7–10)
CHLORIDE SERPL-SCNC: 104 MMOL/L (ref 99–112)
CO2 SERPL-SCNC: 33.3 MMOL/L (ref 24.3–31.9)
CREAT BLD-MCNC: 0.51 MG/DL (ref 0.43–1.29)
CRP SERPL-MCNC: 1.69 MG/DL (ref 0–0.99)
DEPRECATED RDW RBC AUTO: 44.7 FL (ref 37–54)
EOSINOPHIL # BLD AUTO: 0.02 10*3/MM3 (ref 0–0.7)
EOSINOPHIL NFR BLD AUTO: 0.4 % (ref 0–7)
ERYTHROCYTE [DISTWIDTH] IN BLOOD BY AUTOMATED COUNT: 14.1 % (ref 11.5–14.5)
GFR SERPL CREATININE-BSD FRML MDRD: 115 ML/MIN/1.73
GLOBULIN UR ELPH-MCNC: 2.2 GM/DL
GLUCOSE BLD-MCNC: 126 MG/DL (ref 70–110)
GLUCOSE BLDC GLUCOMTR-MCNC: 119 MG/DL (ref 70–130)
GLUCOSE BLDC GLUCOMTR-MCNC: 134 MG/DL (ref 70–130)
GLUCOSE BLDC GLUCOMTR-MCNC: 83 MG/DL (ref 70–130)
GLUCOSE BLDC GLUCOMTR-MCNC: 97 MG/DL (ref 70–130)
HCT VFR BLD AUTO: 37.9 % (ref 37–47)
HGB BLD-MCNC: 12 G/DL (ref 12–16)
IMM GRANULOCYTES # BLD: 0.01 10*3/MM3 (ref 0–0.03)
IMM GRANULOCYTES NFR BLD: 0.2 % (ref 0–0.5)
LYMPHOCYTES # BLD AUTO: 0.57 10*3/MM3 (ref 1–3)
LYMPHOCYTES NFR BLD AUTO: 10.3 % (ref 16–46)
MAGNESIUM SERPL-MCNC: 1.8 MG/DL (ref 1.7–2.6)
MCH RBC QN AUTO: 28.2 PG (ref 27–33)
MCHC RBC AUTO-ENTMCNC: 31.7 G/DL (ref 33–37)
MCV RBC AUTO: 89.2 FL (ref 80–94)
MONOCYTES # BLD AUTO: 0.35 10*3/MM3 (ref 0.1–0.9)
MONOCYTES NFR BLD AUTO: 6.3 % (ref 0–12)
NEUTROPHILS # BLD AUTO: 4.6 10*3/MM3 (ref 1.4–6.5)
NEUTROPHILS NFR BLD AUTO: 82.8 % (ref 40–75)
OSMOLALITY SERPL CALC.SUM OF ELEC: 281 MOSM/KG (ref 273–305)
PHOSPHATE SERPL-MCNC: 1.5 MG/DL (ref 2.7–4.5)
PLATELET # BLD AUTO: 231 10*3/MM3 (ref 130–400)
PMV BLD AUTO: 8.9 FL (ref 6–10)
POTASSIUM BLD-SCNC: 3.7 MMOL/L (ref 3.5–5.3)
PROT SERPL-MCNC: 5.3 G/DL (ref 6–8)
RBC # BLD AUTO: 4.25 10*6/MM3 (ref 4.2–5.4)
SODIUM BLD-SCNC: 140 MMOL/L (ref 135–153)
WBC NRBC COR # BLD: 5.55 10*3/MM3 (ref 4.5–12.5)

## 2018-01-26 PROCEDURE — 25010000002 FENTANYL CITRATE (PF) 100 MCG/2ML SOLUTION: Performed by: INTERNAL MEDICINE

## 2018-01-26 PROCEDURE — 99231 SBSQ HOSP IP/OBS SF/LOW 25: CPT | Performed by: SURGERY

## 2018-01-26 PROCEDURE — 94799 UNLISTED PULMONARY SVC/PX: CPT

## 2018-01-26 PROCEDURE — 94660 CPAP INITIATION&MGMT: CPT

## 2018-01-26 PROCEDURE — 86140 C-REACTIVE PROTEIN: CPT | Performed by: INTERNAL MEDICINE

## 2018-01-26 PROCEDURE — 25010000002 HEPARIN (PORCINE) PER 1000 UNITS: Performed by: INTERNAL MEDICINE

## 2018-01-26 PROCEDURE — 63710000001 PREDNISONE PER 1 MG: Performed by: NURSE PRACTITIONER

## 2018-01-26 PROCEDURE — 71045 X-RAY EXAM CHEST 1 VIEW: CPT | Performed by: RADIOLOGY

## 2018-01-26 PROCEDURE — 85025 COMPLETE CBC W/AUTO DIFF WBC: CPT | Performed by: INTERNAL MEDICINE

## 2018-01-26 PROCEDURE — 99291 CRITICAL CARE FIRST HOUR: CPT | Performed by: INTERNAL MEDICINE

## 2018-01-26 PROCEDURE — 71045 X-RAY EXAM CHEST 1 VIEW: CPT

## 2018-01-26 PROCEDURE — 99233 SBSQ HOSP IP/OBS HIGH 50: CPT | Performed by: INTERNAL MEDICINE

## 2018-01-26 PROCEDURE — 82962 GLUCOSE BLOOD TEST: CPT

## 2018-01-26 PROCEDURE — 80053 COMPREHEN METABOLIC PANEL: CPT | Performed by: INTERNAL MEDICINE

## 2018-01-26 PROCEDURE — 83735 ASSAY OF MAGNESIUM: CPT | Performed by: INTERNAL MEDICINE

## 2018-01-26 PROCEDURE — 99231 SBSQ HOSP IP/OBS SF/LOW 25: CPT | Performed by: PHYSICIAN ASSISTANT

## 2018-01-26 PROCEDURE — 84100 ASSAY OF PHOSPHORUS: CPT | Performed by: INTERNAL MEDICINE

## 2018-01-26 PROCEDURE — 25010000002 VANCOMYCIN PER 500 MG: Performed by: INTERNAL MEDICINE

## 2018-01-26 RX ORDER — PREDNISONE 20 MG/1
20 TABLET ORAL
Status: DISCONTINUED | OUTPATIENT
Start: 2018-01-26 | End: 2018-01-27 | Stop reason: HOSPADM

## 2018-01-26 RX ADMIN — Medication: at 09:17

## 2018-01-26 RX ADMIN — FERROUS SULFATE TAB 325 MG (65 MG ELEMENTAL FE) 325 MG: 325 (65 FE) TAB at 18:11

## 2018-01-26 RX ADMIN — PANTOPRAZOLE SODIUM 40 MG: 40 TABLET, DELAYED RELEASE ORAL at 20:43

## 2018-01-26 RX ADMIN — Medication 81 MG: at 09:15

## 2018-01-26 RX ADMIN — METRONIDAZOLE 500 MG: 500 INJECTION, SOLUTION INTRAVENOUS at 13:42

## 2018-01-26 RX ADMIN — POTASSIUM PHOSPHATE, MONOBASIC AND POTASSIUM PHOSPHATE, DIBASIC 30 MMOL: 224; 236 INJECTION, SOLUTION INTRAVENOUS at 09:25

## 2018-01-26 RX ADMIN — BUSPIRONE HYDROCHLORIDE 5 MG: 5 TABLET ORAL at 20:43

## 2018-01-26 RX ADMIN — METRONIDAZOLE 500 MG: 500 INJECTION, SOLUTION INTRAVENOUS at 20:44

## 2018-01-26 RX ADMIN — GUAIFENESIN 400 MG: 200 TABLET ORAL at 09:14

## 2018-01-26 RX ADMIN — KETOTIFEN FUMARATE 1 DROP: 0.35 SOLUTION/ DROPS OPHTHALMIC at 09:17

## 2018-01-26 RX ADMIN — FERROUS SULFATE TAB 325 MG (65 MG ELEMENTAL FE) 325 MG: 325 (65 FE) TAB at 09:14

## 2018-01-26 RX ADMIN — METRONIDAZOLE 500 MG: 500 INJECTION, SOLUTION INTRAVENOUS at 04:08

## 2018-01-26 RX ADMIN — ONDANSETRON 4 MG: 4 TABLET, FILM COATED ORAL at 04:49

## 2018-01-26 RX ADMIN — IPRATROPIUM BROMIDE AND ALBUTEROL SULFATE 3 ML: .5; 3 SOLUTION RESPIRATORY (INHALATION) at 00:40

## 2018-01-26 RX ADMIN — IPRATROPIUM BROMIDE AND ALBUTEROL SULFATE 3 ML: .5; 3 SOLUTION RESPIRATORY (INHALATION) at 13:10

## 2018-01-26 RX ADMIN — Medication 1000 MCG: at 09:14

## 2018-01-26 RX ADMIN — HYDROCODONE BITARTRATE AND ACETAMINOPHEN 1 TABLET: 5; 325 TABLET ORAL at 11:09

## 2018-01-26 RX ADMIN — KETOTIFEN FUMARATE 1 DROP: 0.35 SOLUTION/ DROPS OPHTHALMIC at 20:50

## 2018-01-26 RX ADMIN — FENTANYL CITRATE 100 MCG: 50 INJECTION INTRAMUSCULAR; INTRAVENOUS at 13:57

## 2018-01-26 RX ADMIN — HEPARIN SODIUM 5000 UNITS: 5000 INJECTION, SOLUTION INTRAVENOUS; SUBCUTANEOUS at 09:16

## 2018-01-26 RX ADMIN — MEMANTINE HYDROCHLORIDE 10 MG: 10 TABLET, FILM COATED ORAL at 20:43

## 2018-01-26 RX ADMIN — HEPARIN SODIUM 5000 UNITS: 5000 INJECTION, SOLUTION INTRAVENOUS; SUBCUTANEOUS at 20:43

## 2018-01-26 RX ADMIN — HYDROCODONE BITARTRATE AND ACETAMINOPHEN 1 TABLET: 5; 325 TABLET ORAL at 18:45

## 2018-01-26 RX ADMIN — CETIRIZINE HYDROCHLORIDE 5 MG: 10 TABLET ORAL at 09:15

## 2018-01-26 RX ADMIN — Medication: at 11:11

## 2018-01-26 RX ADMIN — AMLODIPINE BESYLATE 5 MG: 5 TABLET ORAL at 09:15

## 2018-01-26 RX ADMIN — PREDNISONE 20 MG: 20 TABLET ORAL at 10:23

## 2018-01-26 RX ADMIN — Medication 1 CAPSULE: at 09:14

## 2018-01-26 RX ADMIN — Medication: at 18:15

## 2018-01-26 RX ADMIN — VANCOMYCIN HYDROCHLORIDE 1000 MG: 5 INJECTION, POWDER, LYOPHILIZED, FOR SOLUTION INTRAVENOUS at 09:24

## 2018-01-26 RX ADMIN — CHOLECALCIFEROL TAB 10 MCG (400 UNIT) 1000 UNITS: 10 TAB at 09:14

## 2018-01-26 RX ADMIN — GUAIFENESIN 400 MG: 200 TABLET ORAL at 20:43

## 2018-01-26 RX ADMIN — ATORVASTATIN CALCIUM 20 MG: 20 TABLET, FILM COATED ORAL at 20:43

## 2018-01-26 RX ADMIN — IPRATROPIUM BROMIDE AND ALBUTEROL SULFATE 3 ML: .5; 3 SOLUTION RESPIRATORY (INHALATION) at 18:51

## 2018-01-26 RX ADMIN — BUDESONIDE AND FORMOTEROL FUMARATE DIHYDRATE 2 PUFF: 160; 4.5 AEROSOL RESPIRATORY (INHALATION) at 06:51

## 2018-01-26 RX ADMIN — BUSPIRONE HYDROCHLORIDE 5 MG: 5 TABLET ORAL at 09:15

## 2018-01-26 RX ADMIN — BUDESONIDE AND FORMOTEROL FUMARATE DIHYDRATE 2 PUFF: 160; 4.5 AEROSOL RESPIRATORY (INHALATION) at 18:51

## 2018-01-26 RX ADMIN — DONEPEZIL HYDROCHLORIDE 10 MG: 5 TABLET, FILM COATED ORAL at 20:43

## 2018-01-26 RX ADMIN — Medication 1 APPLICATION: at 20:50

## 2018-01-26 RX ADMIN — IPRATROPIUM BROMIDE AND ALBUTEROL SULFATE 3 ML: .5; 3 SOLUTION RESPIRATORY (INHALATION) at 06:40

## 2018-01-26 RX ADMIN — ESCITALOPRAM 5 MG: 10 TABLET, FILM COATED ORAL at 10:22

## 2018-01-26 RX ADMIN — PANTOPRAZOLE SODIUM 40 MG: 40 TABLET, DELAYED RELEASE ORAL at 09:14

## 2018-01-26 RX ADMIN — SODIUM PHOSPHATE, MONOBASIC, MONOHYDRATE 30 MMOL: 276; 142 INJECTION, SOLUTION INTRAVENOUS at 09:25

## 2018-01-26 RX ADMIN — MEMANTINE HYDROCHLORIDE 10 MG: 10 TABLET, FILM COATED ORAL at 09:14

## 2018-01-26 RX ADMIN — SODIUM PHOSPHATE, MONOBASIC, MONOHYDRATE 30 MMOL: 276; 142 INJECTION, SOLUTION INTRAVENOUS at 09:24

## 2018-01-26 RX ADMIN — ONDANSETRON 4 MG: 4 TABLET, FILM COATED ORAL at 18:11

## 2018-01-26 NOTE — PLAN OF CARE
Problem: Pneumonia (Adult)  Goal: Signs and Symptoms of Listed Potential Problems Will be Absent or Manageable (Pneumonia)  Outcome: Ongoing (interventions implemented as appropriate)   01/26/18 1359   Pneumonia   Problems Assessed (Pneumonia) all   Problems Present (Pneumonia) respiratory compromise       Problem: Fall Risk (Adult)  Goal: Identify Related Risk Factors and Signs and Symptoms  Outcome: Ongoing (interventions implemented as appropriate)   01/26/18 1359   Fall Risk   Fall Risk: Related Risk Factors age-related changes;fear of falling   Fall Risk: Signs and Symptoms presence of risk factors     Goal: Absence of Falls  Outcome: Ongoing (interventions implemented as appropriate)   01/26/18 1359   Fall Risk (Adult)   Absence of Falls making progress toward outcome       Problem: Skin Integrity Impairment, Risk/Actual (Adult)  Goal: Identify Related Risk Factors and Signs and Symptoms  Outcome: Ongoing (interventions implemented as appropriate)   01/26/18 1359   Skin Integrity Impairment, Risk/Actual   Skin Integrity Impairment, Risk/Actual: Related Risk Factors age extremes;mechanical factors   Signs and Symptoms (Skin Integrity Impairment) edema     Goal: Skin Integrity/Wound Healing  Outcome: Ongoing (interventions implemented as appropriate)   01/26/18 1359   Skin Integrity Impairment, Risk/Actual (Adult)   Skin Integrity/Wound Healing making progress toward outcome       Problem: Pressure Ulcer (Adult)  Goal: Signs and Symptoms of Listed Potential Problems Will be Absent or Manageable (Pressure Ulcer)  Outcome: Ongoing (interventions implemented as appropriate)   01/26/18 1359   Pressure Ulcer   Problems Assessed (Pressure Ulcer) all   Problems Present (Pressure Ulcer) none       Problem: Patient Care Overview (Adult)  Goal: Plan of Care Review  Outcome: Ongoing (interventions implemented as appropriate)   01/26/18 1359   Coping/Psychosocial Response Interventions   Plan Of Care Reviewed With patient    Patient Care Overview   Progress progress towards functional goals is fair     Goal: Discharge Needs Assessment  Outcome: Ongoing (interventions implemented as appropriate)   01/26/18 6043   Discharge Needs Assessment   Discharge Disposition still a patient

## 2018-01-26 NOTE — PROGRESS NOTES
LOS: 4 days     Chief Complaint:  Pulmonology is following for respiratory failure     Subjective     Interval History:     Mrs. Do is doing well this morning, no acute distress noted on exam.     History taken from: patient chart RN    Review of Systems:   Review of Systems   Constitutional: Negative for chills, fatigue and fever.   HENT: Negative for congestion and rhinorrhea.    Eyes: Negative for visual disturbance.   Respiratory: Negative for cough, shortness of breath and wheezing.    Cardiovascular: Negative for chest pain and leg swelling.   Gastrointestinal: Negative for abdominal distention and abdominal pain.   Endocrine: Negative for cold intolerance and heat intolerance.   Genitourinary: Negative for difficulty urinating.   Musculoskeletal: Negative for arthralgias and myalgias.   Skin: Negative for color change.   Allergic/Immunologic: Negative for environmental allergies.   Neurological: Negative for dizziness, weakness and light-headedness.   Psychiatric/Behavioral: Negative for agitation and behavioral problems.                     Objective     Vital Signs  Temp:  [98.2 °F (36.8 °C)-99 °F (37.2 °C)] 98.2 °F (36.8 °C)  Heart Rate:  [75-99] 91  Resp:  [15-23] 23  BP: (106-164)/(52-87) 164/85  Body mass index is 22.61 kg/(m^2).    Intake/Output Summary (Last 24 hours) at 01/26/18 1127  Last data filed at 01/26/18 0925   Gross per 24 hour   Intake             1450 ml   Output             2040 ml   Net             -590 ml     I/O this shift:  In: 1000 [IV Piggyback:1000]  Out: -     Physical Exam:  GENERAL APPEARANCE: Well developed, well nourished, alert and cooperative, and appears to be in no acute distress.    HEAD: normocephalic.    EYES: PERRL    NECK: Neck supple.     CARDIAC: Normal S1 and S2. No S3, S4 or murmurs. Rhythm is regular. There is no peripheral edema, cyanosis or pallor. Extremities are warm and well perfused. Capillary refill is less than 2 seconds. No carotid  bruits.    Respiratory: Clear to auscultation and percussion without rales, rhonchi, wheezing or diminished breath sounds.    GI: Positive bowel sounds. Soft, nondistended, nontender.     Musculoskeletal: No significant deformity or joint abnormality. No edema. Peripheral pulses intact.     NEUROLOGICAL: Strength and sensation symmetric and intact throughout.     PSYCHIATRIC: The mental examination revealed the patient was oriented to person, place, and time.                 Results Review:                I reviewed the patient's new clinical results.  I reviewed the patient's new imaging results and agree with the interpretation.    Results from last 7 days  Lab Units 01/26/18  0113 01/25/18  0420 01/24/18  0059   WBC 10*3/mm3 5.55 6.42 5.05   HEMOGLOBIN g/dL 12.0 12.0 11.9*   PLATELETS 10*3/mm3 231 233 212       Results from last 7 days  Lab Units 01/26/18  0113 01/25/18  0420 01/24/18  0059   SODIUM mmol/L 140 143 141   POTASSIUM mmol/L 3.7 4.2 3.3*   CHLORIDE mmol/L 104 109 109   CO2 mmol/L 33.3* 29.2 26.0   BUN mg/dL 13 14 13   CREATININE mg/dL 0.51 0.59 0.54   CALCIUM mg/dL 8.5 8.8 8.3   GLUCOSE mg/dL 126* 109 118*   MAGNESIUM mg/dL 1.8 1.9 2.0     Lab Results   Component Value Date    INR 0.93 03/06/2017    INR <0.90 10/25/2016    PROTIME 10.3 03/06/2017    PROTIME 10.1 10/25/2016       Results from last 7 days  Lab Units 01/26/18  0113 01/25/18  0420 01/24/18  0059   ALK PHOS U/L 51 54 63   BILIRUBIN mg/dL 0.4 0.2 0.2   ALT (SGPT) U/L 23 19 22   AST (SGOT) U/L 21 20 22       Results from last 7 days  Lab Units 01/25/18  0538   PH, ARTERIAL pH units 7.443   PO2 ART mm Hg 69.5*   PCO2, ARTERIAL mm Hg 42.0   HCO3 ART mmol/L 28.1*     Imaging Results (last 24 hours)     Procedure Component Value Units Date/Time    XR Chest 1 View [[id number]] Collected:  01/25/18 1324     Updated:  01/25/18 1346    Narrative:       XR CHEST 1 VIEW-     HISTORY:  Chest tube placement; Z43.1-Encounter for attention to  gastrostomy;  J18.1-Lobar pneumonia, unspecified organism; A41.9-Sepsis,  unspecified organism; R41.82-Altered mental status, unspecified;  J96.01-Acute respiratory failure with hypoxia; J96.02-Acute respiratory  failure with hypercapnia; K94.23-Gastrostomy malfunction          COMPARISON: 01/25/2018.      TECHNIQUE: Single frontal view of the chest.     FINDINGS:  Large bore right-sided thoracostomy tube has now been placed.  There is a tiny right apical pneumothorax.  The cardiac silhouette is normal. The pulmonary vasculature is  unremarkable.  There is no evidence of an acute osseous abnormality.   There are no suspicious-appearing parenchymal soft tissue nodules.            Impression:       Tiny residual right apical pneumothorax.         This report was finalized on 1/25/2018 1:25 PM by Dr. Fabio Zheng MD.       XR Chest AP [[id number]] Collected:  01/26/18 0846     Updated:  01/26/18 1044    Narrative:       XR CHEST AP-     HISTORY:  Pneumothorax; Z43.1-Encounter for attention to gastrostomy;  J18.1-Lobar pneumonia, unspecified organism; A41.9-Sepsis, unspecified  organism; R41.82-Altered mental status, unspecified; J96.01-Acute  respiratory failure with hypoxia; J96.02-Acute respiratory failure with  hypercapnia; K94.23-Gastrostomy malfunction          COMPARISON: 01/25/2018.      TECHNIQUE: Single frontal view of the chest.     FINDINGS:     No residual pneumothorax seen on today's exam.  The cardiac silhouette is normal. The pulmonary vasculature is  unremarkable.  There is no evidence of an acute osseous abnormality.   There are no suspicious-appearing parenchymal soft tissue nodules.            Impression:       No residual pneumothorax.         This report was finalized on 1/26/2018 10:42 AM by Dr. Fabio Zheng MD.                Medication Review:   Scheduled Medications:    amLODIPine 5 mg Oral Daily   aspirin 81 mg Oral Daily   atorvastatin 20 mg Oral Nightly   budesonide-formoterol 2 puff Inhalation BID - RT    busPIRone 5 mg Oral Q12H   cetirizine 5 mg Oral Daily   cholecalciferol 1,000 Units Oral Daily   donepezil 10 mg Oral Nightly   escitalopram 5 mg Oral Daily   ferrous sulfate 325 mg Oral BID With Meals   guaiFENesin 400 mg Oral BID   heparin (porcine) 5,000 Units Subcutaneous Q12H   insulin aspart 0-7 Units Subcutaneous 4x Daily AC & at Bedtime   ipratropium-albuterol 3 mL Nebulization Q6H - RT   ketotifen 1 drop Both Eyes BID   lactobacillus acidophilus 1 capsule Oral Daily   magic barrier cream  Topical 4x Daily   memantine 10 mg Oral BID   metroNIDAZOLE 500 mg Intravenous Q8H   pantoprazole 40 mg Oral BID   potassium phosphate infusion greater than 15 mMoles 30 mmol Intravenous Once   predniSONE 20 mg Oral Daily With Breakfast   vancomycin 1,000 mg Intravenous Q18H   vitamin B-12 1,000 mcg Oral Daily     Continuous infusions:    Pharmacy to dose vancomycin        Assessment/Plan      Neuro: awake, alert and oriented x3, no concerns.       Respiratory Failure-hypercarbic and hypoxic: likely related to underlying lung disease, COPD, and pneumonia likely related to aspiration. Continue antibiotics.  universal aspiration precautions. Continue supplemental oxygen via regular nasal cannula to maintain Sp02 >92-94%. Medrol changed to prednisone PO, continue to wean as tolerated.       Incentive spirometer 10 times an hour. Continue scheduled inhalants and nebulizer's.       Pneumothorax: Chest tube in place, no concerns, chest xray from morning seen no penumothorax  Cardiac: HR 80, /79 continue continuous ECG and v/s monitoring, maintain MAP >65.       Renal: Creatinine .51,  continue strict I&O, electrolytes replaced accordingly.      Hypophosphatemia: resolved,  level is 1.5, Sodium phosphates 30 mmol IV x1 given today, continue kphosph packets BID x1 day. Recheck level in Am.       GI: continue protonix.       DVT prophylaxis: heparin SQ BID.       IV access: central line in right subclavian 1/22/18.        ID: WBC is 5.55 neutrophils are 82.8, no fevers overnight, continue antibiotics. Continue to monitor lab trends and clinical changes.  Microbiology Results (last 10 days)     Procedure Component Value - Date/Time    Legionella Antigen, Urine - Urine, Urine, Clean Catch [[id number]]  (Normal) Collected:  01/25/18 0908    Lab Status:  Final result Specimen:  Urine from Urine, Catheter Updated:  01/25/18 1026     LEGIONELLA ANTIGEN, URINE Negative    Narrative:         Presumptive negative for L. pneumophilia serogroup 1 antigen, suggesting no recent or current infection.    Blood Culture - Blood, [[id number]]  (Normal) Collected:  01/22/18 1513    Lab Status:  Preliminary result Specimen:  Blood from Hand, Left Updated:  01/25/18 1716     Blood Culture No growth at 3 days    Clostridium Difficile Toxin - Stool, Per Rectum [[id number]] Collected:  01/22/18 1024    Lab Status:  Final result Specimen:  Stool from Per Rectum Updated:  01/22/18 1312    Narrative:       The following orders were created for panel order Clostridium Difficile Toxin - Stool, Per Rectum.  Procedure                               Abnormality         Status                     ---------                               -----------         ------                     Clostridium Difficile To...[[id number]]                      Final result                 Please view results for these tests on the individual orders.    Ova & Parasite Examination - Stool, Per Rectum [[id number]] Collected:  01/22/18 1024    Lab Status:  Final result Specimen:  Stool from Per Rectum Updated:  01/23/18 2307     Ova + Parasite Exam Final report      These results were obtained using wet preparation(s) and trichrome  stained smear. This test does not include testing for Cryptosporidium  parvum, Cyclospora, or Microsporidia.        Ova + Parasite Result 1 Comment      No ova, cysts, or parasites seen.       Narrative:       Performed at:  [address] [place] [address]  [address]  [place], [place]  [id number]  : Familia Mena PhD, Phone:  [phone]    Stool Culture - Stool, Per Rectum [[id number]] Collected:  01/22/18 1024    Lab Status:  Final result Specimen:  Stool from Per Rectum Updated:  01/24/18 1246     Stool Culture Normal Fecal Margy    Narrative:         Absence of Campylobacter antigen or below limit of detection.  Negative for Enterohemorrhagic E. coli Shiga Toxin 1.  Negative for Enterohemorrhagic E. coli Shiga Toxin 2.  No Salmonella or Shigella isolated. Yersinia and Vibrio not performed.    Clostridium Difficile Toxin, PCR - Stool, Per Rectum [[id number]] Collected:  01/22/18 1024    Lab Status:  Final result Specimen:  Stool from Per Rectum Updated:  01/22/18 1312     C. Difficile Toxins by PCR Negative     027 Toxin Presumptive Negative    Narrative:         For In Vitro Diagnostic use only.  027-NAP1-BI results are NOT intended to guide treatment. 027 typing is relative to PCR ribotyping and shown to be equivalent to B1 typing by restriction endonuclease analysis or NAP1 typing by pulse field gel electrophoresis.    Blood Culture - Blood, [[id number]]  (Normal) Collected:  01/22/18 0931    Lab Status:  Preliminary result Specimen:  Blood from Blood, Central Line Updated:  01/25/18 1316     Blood Culture No growth at 3 days    Influenza Antigen, Rapid - Swab, Nasopharynx [[id number]]  (Normal) Collected:  01/22/18 0918    Lab Status:  Final result Specimen:  Swab from Nasopharynx Updated:  01/22/18 0957     Influenza A Ag, EIA Negative     Influenza B Ag, EIA Negative          Patient Active Problem List   Diagnosis Code   • Pneumonia of left lower lobe due to infectious organism J18.1   • Dislodged gastrostomy tube Z43.1       ROLLY Rocha  01/26/18  11:27 AM      Scribed for Dr. Timmons by ROLLY Florez.     IKenroy M.D. attest that the above note accurately reflects the work and decisions made  by me.  Patient was seen and  evaluated by Dr. Timmons, including history of present illness, physical exam, assessment, and treatment plan.  The above note was reviewed and edited by Dr. Timmons. Cc time 32 minutes

## 2018-01-26 NOTE — PROGRESS NOTES
Subjective     History:   Amy Do is a 82 y.o. female admitted on 1/22/2018 secondary to Dislodged gastrostomy tube     Procedures:   1/22/18: Right subclavian central line placement in the ED  1/24/18: Right-sided chest tube placement   1/26/18: Removal of previous chest tube and placement of new right-sided chest tube    Patient seen and examined with ANTWAN Osman. Awake and alert. Reports improvement in her dyspnea following chest tube placement yesterday. Reports productive cough. Reports brief episode of chest pressure. Reports persistent nausea. No reported vomiting. Abdominal pain improved. No acute events overnight per RN.     History taken from: patient, chart, and RN.      Objective     Vital Signs  Temp:  [98.2 °F (36.8 °C)-99 °F (37.2 °C)] 98.2 °F (36.8 °C)  Heart Rate:  [75-99] 80  Resp:  [15-23] 23  BP: (106-163)/(52-87) 152/79    Intake/Output Summary (Last 24 hours) at 01/26/18 0903  Last data filed at 01/26/18 0453   Gross per 24 hour   Intake              450 ml   Output             2040 ml   Net            -1590 ml         Physical Exam:  General:    Awake, alert, in no acute distress, chronically ill appearing   Heart:      Normal S1 and S2. Regular rate and rhythm. No significant murmur, rubs or gallops appreciated.   Lungs:     Respirations regular, even and unlabored. Clear to auscultation on right side. LLL rhonchi overall improved. (+) right-sided chest tube    Abdomen:   Soft. Mild generalized TTP, worse in RENÉE region. No guarding, rebound tenderness or  organomegaly noted. Bowel sounds present x 4.   Extremities:  No clubbing, cyanosis or edema noted. Moves UE and LE equally B/L.     Results Review:      Results from last 7 days  Lab Units 01/26/18  0113 01/25/18  0420 01/24/18  0059 01/23/18  0108 01/22/18  0931   WBC 10*3/mm3 5.55 6.42 5.05 5.75 6.21   HEMOGLOBIN g/dL 12.0 12.0 11.9* 11.7* 12.5   PLATELETS 10*3/mm3 231 233 212 207 204       Results from last 7 days  Lab Units  01/26/18  0113 01/25/18  0420 01/24/18  0059 01/23/18  0108 01/22/18  0931   SODIUM mmol/L 140 143 141 141 135   POTASSIUM mmol/L 3.7 4.2 3.3* 3.8 3.6   CHLORIDE mmol/L 104 109 109 111 105   CO2 mmol/L 33.3* 29.2 26.0 23.8* 23.0*   BUN mg/dL 13 14 13 22* 22*   CREATININE mg/dL 0.51 0.59 0.54 0.79 1.17   CALCIUM mg/dL 8.5 8.8 8.3 8.4 8.7   GLUCOSE mg/dL 126* 109 118* 99 106       Results from last 7 days  Lab Units 01/26/18  0113 01/25/18  0420 01/24/18  0059 01/22/18  0931   BILIRUBIN mg/dL 0.4 0.2 0.2 0.3   ALK PHOS U/L 51 54 63 68   AST (SGOT) U/L 21 20 22 23   ALT (SGPT) U/L 23 19 22 20       Results from last 7 days  Lab Units 01/26/18  0113 01/25/18  0420 01/24/18  0059 01/23/18  0658 01/23/18  0108   MAGNESIUM mg/dL 1.8 1.9 2.0 2.7* 1.4*           Results from last 7 days  Lab Units 01/23/18  0658 01/23/18  0108 01/22/18  1939   CK TOTAL U/L 229*  229* 264* 276*  276*   TROPONIN I ng/mL 0.098* 0.146* 0.207*   CK MB INDEX % 2.7 2.5 2.1       Imaging Results (last 24 hours)     Procedure Component Value Units Date/Time    XR Chest 1 View [[id number]] Collected:  01/25/18 1324     Updated:  01/25/18 1346    Narrative:       XR CHEST 1 VIEW-     HISTORY:  Chest tube placement; Z43.1-Encounter for attention to  gastrostomy; J18.1-Lobar pneumonia, unspecified organism; A41.9-Sepsis,  unspecified organism; R41.82-Altered mental status, unspecified;  J96.01-Acute respiratory failure with hypoxia; J96.02-Acute respiratory  failure with hypercapnia; K94.23-Gastrostomy malfunction          COMPARISON: 01/25/2018.      TECHNIQUE: Single frontal view of the chest.     FINDINGS:  Large bore right-sided thoracostomy tube has now been placed.  There is a tiny right apical pneumothorax.  The cardiac silhouette is normal. The pulmonary vasculature is  unremarkable.  There is no evidence of an acute osseous abnormality.   There are no suspicious-appearing parenchymal soft tissue nodules.            Impression:       Tiny  residual right apical pneumothorax.         This report was finalized on 1/25/2018 1:25 PM by Dr. Fabio Zheng MD.       XR Chest AP [[id number]] Collected:  01/26/18 0846     Updated:  01/26/18 0846    Narrative:       XR CHEST AP-     HISTORY:  Pneumothorax; Z43.1-Encounter for attention to gastrostomy;  J18.1-Lobar pneumonia, unspecified organism; A41.9-Sepsis, unspecified  organism; R41.82-Altered mental status, unspecified; J96.01-Acute  respiratory failure with hypoxia; J96.02-Acute respiratory failure with  hypercapnia; K94.23-Gastrostomy malfunction          COMPARISON: 01/25/2018.      TECHNIQUE: Single frontal view of the chest.     FINDINGS:     No residual pneumothorax seen on today's exam.  The cardiac silhouette is normal. The pulmonary vasculature is  unremarkable.  There is no evidence of an acute osseous abnormality.   There are no suspicious-appearing parenchymal soft tissue nodules.            Impression:       No residual pneumothorax.                   Medications:    amLODIPine 5 mg Oral Daily   aspirin 81 mg Oral Daily   atorvastatin 20 mg Oral Nightly   budesonide-formoterol 2 puff Inhalation BID - RT   busPIRone 5 mg Oral Q12H   cetirizine 5 mg Oral Daily   cholecalciferol 1,000 Units Oral Daily   donepezil 10 mg Oral Nightly   escitalopram 5 mg Oral Daily   ferrous sulfate 325 mg Oral BID With Meals   guaiFENesin 400 mg Oral BID   heparin (porcine) 5,000 Units Subcutaneous Q12H   insulin aspart 0-7 Units Subcutaneous 4x Daily AC & at Bedtime   ipratropium-albuterol 3 mL Nebulization Q6H - RT   ketotifen 1 drop Both Eyes BID   lactobacillus acidophilus 1 capsule Oral Daily   magic barrier cream  Topical 4x Daily   memantine 10 mg Oral BID   metroNIDAZOLE 500 mg Intravenous Q8H   pantoprazole 40 mg Oral BID   potassium phosphate infusion greater than 15 mMoles 30 mmol Intravenous Once   predniSONE 20 mg Oral Daily With Breakfast   vancomycin 1,000 mg Intravenous Q18H   vitamin B-12 1,000 mcg  Oral Daily       Pharmacy to dose vancomycin            Assessment/Plan   Severe sepsis: Likely 2/2 left-sided pneumonia. Currently hemodynamically stable. WBC is stable. Lactic acid is normal. CRP is elevated but improving. C diff is negative. Cultures with NGTD. Currently on Vanc and Flagy. Pulm has D/C'd Azactam. Cont to follow cultures and repeat labs in the AM.     Acute hypoxic and hypercapnic respiratory failure: Likely multifactorial 2/2 pneumonia, COPD exacerbation and possible oversedation with opioids. Pneumothorax now likely contributing as well. Off HFNC this AM. Cont to monitor closely in the CCU. Pulm input appreciated.     Left-sided pneumonia: Atypical workup is negative. No evidence of aspiration on speech eval. Cont antibiotics as outlined above. Repeat CXR in the AM.     Acute exacerbation of COPD: Cont treatment as outlined above. Cont steroid taper.    Metabolic encephalopathy: CT head negative for any acute abnormalities. Pt's mental status improved with Narcan in the ED. Appears overall improved. Cont treatment as outlined above and supportive treatment.     Right-sided pneumothorax: Chest tube replaced yesterday after initial chest tube became dislodged. Pneumothorax improved on repeat CXR with no residual pneumothorax on CXR this AM. Repeat CXR in the AM. Pulm and general surgery input appreciated.     Indeterminate range troponin elevation: Possibly 2/2 above. Serial CE's have trended downward. Echo reveals an EF of 66-70%, grade I diastolic dysfunction, mild AR, mild AS, mild MR, mild MS, mild TR and moderately elevated RVSP. Cardiology to consider nuclear stress test once her respiratory status improves.  Monitor on telemetry. Cardiology input appreciated.      Arthritis: Concern for medication misuse upon admission. PRN Norco added back now that her mental status has improved to avoid withdrawal.     Nausea, abdominal pain and diarrhea: Stool studies are negative. Cont PPI. CT  abd/pelvis on 1/22 revealed sigmoid diverticuli but no evidence of diverticulitis. Pancreatic enzymes are normal. GI consulted with recommendations to continue conservative treatment for now. GI input appreciated.     Electrolyte abnormalities: K+ stable today. PO4 remains low. Cont electrolyte replacement protocols and repeat labs in the AM.     DVT PPX: SQ heparin     Disposition: [facility] referral     Pt is at high risk 2/2 severe sepsis, resp failure, pneumonia, COPD exacerbation, pneumothorax, metabolic encephalopathy, concern for medication misuse and advanced age.       Aroldo Gonsalez DO  01/26/18  9:03 AM

## 2018-01-26 NOTE — PLAN OF CARE
Problem: Pressure Ulcer (Adult)  Goal: Signs and Symptoms of Listed Potential Problems Will be Absent or Manageable (Pressure Ulcer)  Outcome: Ongoing (interventions implemented as appropriate)

## 2018-01-26 NOTE — PROGRESS NOTES
Discharge Planning Assessment  [facility]     Patient Name: Amy Do  MRN: [id number]  Today's Date: 1/26/2018    Admit Date: 1/22/2018          Discharge Plan       01/26/18 1101    Case Management/Social Work Plan    Plan SS received call from Qian at [facility] and they are able to accept the pt in the AM on 01/27/18.  Pt's primary physician will be Dr. Wynne.  SS will continue to follow.     Patient/Family In Agreement With Plan yes        Discharge Placement     Facility/Agency Request Status Selected? Address Phone Number Fax Number    [facility] Pending - No Request Sent     [id number] [address] [phone]        Expected Discharge Date and Time     Expected Discharge Date Expected Discharge Time    Jan 27, 2018         Marta Lima

## 2018-01-26 NOTE — SIGNIFICANT NOTE
01/26/18 1408   Rehab Treatment   Discipline physical therapist   Treatment Not Performed patient/family declined treatment  (Pt. states she is very sick, does not want to participate  this date.)

## 2018-01-26 NOTE — PROGRESS NOTES
01/26/18      Amy Do is a 82 y.o. female who is seen today for follow up of nausea, abdominal pain, diarrhea    HPI  The patient was seen for a follow-up visit.  Her son was present who supplied more information about her previous medical history.  The patient reports nausea, right  quadrant abdominal pain, and diarrhea after eating.  She also has dysphagia symptoms at times.  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 Norman.  Her breathing has also improved from yesterday.  Past medical history, social history, and family history remain unchanged since initial consultation.    REVIEW OF SYSTEMS  Nausea, right quadrant abdominal pain, diarrhea, dysphagia, cough; all other symptoms are negative    CURRENT MEDICATION    Current Facility-Administered Medications:   •  amLODIPine (NORVASC) tablet 5 mg, 5 mg, Oral, Daily, Vandana Jansen PA-C, 5 mg at 01/26/18 0915  •  aspirin EC tablet 81 mg, 81 mg, Oral, Daily, Mark Anthony Broussard MD, 81 mg at 01/26/18 0915  •  atorvastatin (LIPITOR) tablet 20 mg, 20 mg, Oral, Nightly, Macho Cruz MD, 20 mg at 01/25/18 2119  •  bisacodyl (DULCOLAX) suppository 10 mg, 10 mg, Rectal, Daily PRN, Vandana Jansen PA-C  •  budesonide-formoterol (SYMBICORT) 160-4.5 MCG/ACT inhaler 2 puff, 2 puff, Inhalation, BID - RT, Vandana Jansen PA-C, 2 puff at 01/26/18 0651  •  busPIRone (BUSPAR) tablet 5 mg, 5 mg, Oral, Q12H, Vandana Jansen PA-C, 5 mg at 01/26/18 0915  •  carboxymethylcellulose (REFRESH PLUS) 0.5 % ophthalmic solution 1 drop, 1 drop, Both Eyes, Q4H PRN, Vandana Jansen PA-C  •  cetirizine (zyrTEC) tablet 5 mg, 5 mg, Oral, Daily, Vandana Jansen PA-C, 5 mg at 01/26/18 0915  •  cholecalciferol (VITAMIN D3) tablet 1,000 Units, 1,000 Units, Oral, Daily, Vandana Jansen PA-C, 1,000 Units at 01/26/18 0914  •  dextrose (D50W) solution 25 g, 25 g, Intravenous,  Q15 Min PRN, Aroldo Gonsalez, DO  •  dextrose (D50W) solution 25 g, 25 g, Intravenous, Q15 Min PRN, Aroldo Gonsalez, DO  •  dextrose (GLUTOSE) oral gel 15 g, 15 g, Oral, Q15 Min PRN, Aroldo Gonsalez, DO  •  dextrose (GLUTOSE) oral gel 15 g, 15 g, Oral, Q15 Min PRN, Aroldo Gonsalez DO  •  docusate sodium (COLACE) capsule 200 mg, 200 mg, Oral, BID PRN, Vandana Jansen PA-C  •  donepezil (ARICEPT) tablet 10 mg, 10 mg, Oral, Nightly, Vandana Jansen PA-C, 10 mg at 01/25/18 2119  •  escitalopram (LEXAPRO) tablet 5 mg, 5 mg, Oral, Daily, Vandana Jansen PA-C, 5 mg at 01/26/18 1022  •  fentaNYL citrate (PF) (SUBLIMAZE) injection 100 mcg, 100 mcg, Intravenous, Q1H PRN, Kenroy Timmons MD, 100 mcg at 01/26/18 1357  •  ferrous sulfate tablet 325 mg, 325 mg, Oral, BID With Meals, Vandana Jansen PA-C, 325 mg at 01/26/18 0914  •  glucagon (human recombinant) (GLUCAGEN DIAGNOSTIC) injection 1 mg, 1 mg, Subcutaneous, Q15 Min PRN, Aroldo Gonsalez, DO  •  glucagon (human recombinant) (GLUCAGEN DIAGNOSTIC) injection 1 mg, 1 mg, Subcutaneous, Q15 Min PRN, Aroldo Gonsalez, DO  •  guaiFENesin tablet 400 mg, 400 mg, Oral, BID, Vandana Jansen PA-C, 400 mg at 01/26/18 0914  •  heparin (porcine) 5000 UNIT/ML injection 5,000 Units, 5,000 Units, Subcutaneous, Q12H, Aroldo Gonsalez DO, 5,000 Units at 01/26/18 0916  •  HYDROcodone-acetaminophen (NORCO) 5-325 MG per tablet 1 tablet, 1 tablet, Oral, Q6H PRN, Finn Benitez MD, 1 tablet at 01/26/18 1109  •  insulin aspart (novoLOG) injection 0-7 Units, 0-7 Units, Subcutaneous, 4x Daily AC & at Bedtime, Aroldo Gonsalez DO, Stopped at 01/25/18 1701  •  ipratropium-albuterol (DUO-NEB) nebulizer solution 3 mL, 3 mL, Nebulization, Q6H - RT, Mark Anthony Broussard MD, 3 mL at 01/26/18 1310  •  ketotifen (ZADITOR) 0.025 % ophthalmic solution 1 drop, 1 drop, Both Eyes, BID, Vandana Jansen PA-C, 1 drop at 01/26/18  0917  •  lactobacillus acidophilus (RISAQUAD) capsule 1 capsule, 1 capsule, Oral, Daily, Vandana Jansen PA-C, 1 capsule at 01/26/18 0914  •  lactulose (CHRONULAC) 10 GM/15ML solution 10 g, 10 g, Oral, Daily PRN, Vandana Jansen PA-C  •  magic barrier cream, , Topical, 4x Daily, Mark Anthony Broussard MD  •  magnesium sulfate 4 gram infusion - Mg less than or equal to 1mg/dL, 4 g, Intravenous, PRN **OR** magnesium sulfate 3 gram infusion (1gm x 3) - Mg 1.1 - 1.5 mg/dL, 1 g, Intravenous, PRN **OR** Magnesium Sulfate 2 gram infusion- Mg 1.6 - 1.9 mg/dL, 2 g, Intravenous, PRN, Mark Anthony Broussard MD  •  memantine (NAMENDA) tablet 10 mg, 10 mg, Oral, BID, Vandana Jansen PA-C, 10 mg at 01/26/18 0914  •  metroNIDAZOLE (FLAGYL) IVPB 500 mg, 500 mg, Intravenous, Q8H, Aroldo Gonsalez DO, 500 mg at 01/26/18 1342  •  nitroglycerin (NITROSTAT) SL tablet 0.4 mg, 0.4 mg, Sublingual, Q5 Min PRN, Vandana Jansen PA-C  •  ondansetron (ZOFRAN) tablet 4 mg, 4 mg, Oral, Q6H PRN, Vandana Jansen PA-C, 4 mg at 01/26/18 0449  •  pantoprazole (PROTONIX) EC tablet 40 mg, 40 mg, Oral, BID, Vandana Jansen PA-C, 40 mg at 01/26/18 0914  •  Pharmacy to dose vancomycin, , Does not apply, Continuous PRN, Aroldo Gonsalez DO  •  polyethylene glycol (MIRALAX) powder 17 g, 17 g, Oral, Daily PRN, Vandana Jansen PA-C  •  potassium chloride (MICRO-K) CR capsule 40 mEq, 40 mEq, Oral, PRN **OR** potassium chloride (KLOR-CON) packet 40 mEq, 40 mEq, Oral, PRN **OR** potassium chloride 10 mEq in 100 mL IVPB, 10 mEq, Intravenous, Q1H PRN, Aroldo Gonsalez, DO  •  potassium phosphate 45 mmol in sodium chloride 0.9 % 500 mL infusion, 45 mmol, Intravenous, PRN **OR** potassium phosphate 30 mmol in sodium chloride 0.9 % 250 mL infusion, 30 mmol, Intravenous, PRN **OR** potassium phosphate 15 mmol in sodium chloride 0.9 % 100 mL infusion, 15 mmol, Intravenous, PRN **OR** sodium phosphates 45 mmol in  sodium chloride 0.9 % 500 mL IVPB, 45 mmol, Intravenous, PRN **OR** sodium phosphates 30 mmol in sodium chloride 0.9 % 250 mL IVPB, 30 mmol, Intravenous, PRN, Last Rate: 31.3 mL/hr at 01/26/18 0925, 30 mmol at 01/26/18 0925 **OR** sodium phosphates 15 mmol in sodium chloride 0.9 % 250 mL IVPB, 15 mmol, Intravenous, PRN, Aroldo Gonsalez, DO  •  potassium phosphate 30 mmol in sodium chloride 0.9 % 250 mL infusion, 30 mmol, Intravenous, Once, Aroldo Gonsalez DO, Last Rate: 31.3 mL/hr at 01/26/18 0925, 30 mmol at 01/26/18 0925  •  predniSONE (DELTASONE) tablet 20 mg, 20 mg, Oral, Daily With Breakfast, Lalita Land, APRN, 20 mg at 01/26/18 1023  •  sodium chloride 0.9 % flush 1-10 mL, 1-10 mL, Intravenous, PRN, Aroldo Gonsalez, DO  •  sodium chloride 0.9 % flush 10 mL, 10 mL, Intravenous, PRN, Sander Castro MD  •  vancomycin (VANCOCIN) 1,000 mg in sodium chloride 0.9 % 250 mL IVPB, 1,000 mg, Intravenous, Q18H, Aroldo Gonsalez DO, 1,000 mg at 01/26/18 0924  •  vitamin B-12 (CYANOCOBALAMIN) tablet 1,000 mcg, 1,000 mcg, Oral, Daily, Vandana Jansen PA-C, 1,000 mcg at 01/26/18 0914    ALLERGIES  Contrast dye; Penicillins; and Sulfa antibiotics     VITAL SIGNS  Vital Signs (last 24 hours)       01/25 0700  -  01/26 0659 01/26 0700  -  01/26 1704   Most Recent    Temp (°F) 98.2 -  99    98 -  99     99 (37.2)    Heart Rate 75 -  106    76 -  91     84    Resp 15 -  23    15 -  20     16    /60 -  163/78    144/76 -  172/88     144/76    SpO2 (%) 93 -  100    90 -  99     96            PHYSICAL EXAM  General:  Appearance alert, pleasant, interactive and cooperative; pallor  Head:  normocephalic, without obvious abnormality and atraumatic  Eyes:  lids and lashes normal, conjunctivae and sclerae normal, no icterus, no pallor, corneas clear and PERRLA  Ears:  ears appear intact with no abnormalities noted  Nose:  nares normal, septum midline, mucosa normal and no drainage  Throat:  no  oral lesions, no thrush and oral mucosa moist  Lungs:  Right sided chest tube in place; clear to auscultation, respirations regular, respirations even and respirations unlabored  Heart:  regular rhythm & normal rate, normal S1, S2, no murmur, no gallop, no rub and no click  Abdomen:  normal bowel sounds, no masses, no hepatomegaly, no splenomegaly, soft non-tender, no guarding and no rebound tenderness  Extremities:  moves extremities well, no edema, no cyanosis and no redness  Skin:  Pallor; no bleeding, bruising or rash,  no lesions noted and temperature normal  Neurologic:  Mental Status: Some memory deficits noted; orientated to person, place, time and situation, alertness alert, awake and arousable, orientation time, date, person, place, city and president and mood/affect:  normal        LABS   Lab Results (last 24 hours)     Procedure Component Value Units Date/Time    Blood Culture - Blood, [[id number]]  (Normal) Collected:  01/22/18 1513    Specimen:  Blood from Hand, Left Updated:  01/25/18 1716     Blood Culture No growth at 3 days    POC Glucose Once [[id number]]  (Normal) Collected:  01/25/18 1700    Specimen:  Blood Updated:  01/25/18 1725     Glucose 81 mg/dL     Narrative:       Meter: [id number] : [id number] DEMAR EDMONDSON    POC Glucose Once [[id number]]  (Abnormal) Collected:  01/25/18 2141    Specimen:  Blood Updated:  01/25/18 2147     Glucose 135 (H) mg/dL     Narrative:       Meter: [id number] : [id number] DRAGAN KNOWLES    CBC & Differential [[id number]] Collected:  01/26/18 0113    Specimen:  Blood Updated:  01/26/18 0134    Narrative:       The following orders were created for panel order CBC & Differential.  Procedure                               Abnormality         Status                     ---------                               -----------         ------                     CBC Auto Differential[[id number]]        Abnormal            Final result                 Please view  results for these tests on the individual orders.    CBC Auto Differential [[id number]]  (Abnormal) Collected:  01/26/18 0113    Specimen:  Blood Updated:  01/26/18 0134     WBC 5.55 10*3/mm3      RBC 4.25 10*6/mm3      Hemoglobin 12.0 g/dL      Hematocrit 37.9 %      MCV 89.2 fL      MCH 28.2 pg      MCHC 31.7 (L) g/dL      RDW 14.1 %      RDW-SD 44.7 fl      MPV 8.9 fL      Platelets 231 10*3/mm3      Neutrophil % 82.8 (H) %      Lymphocyte % 10.3 (L) %      Monocyte % 6.3 %      Eosinophil % 0.4 %      Basophil % 0.0 %      Immature Grans % 0.2 %      Neutrophils, Absolute 4.60 10*3/mm3      Lymphocytes, Absolute 0.57 (L) 10*3/mm3      Monocytes, Absolute 0.35 10*3/mm3      Eosinophils, Absolute 0.02 10*3/mm3      Basophils, Absolute 0.00 10*3/mm3      Immature Grans, Absolute 0.01 10*3/mm3     Comprehensive Metabolic Panel [[id number]]  (Abnormal) Collected:  01/26/18 0113    Specimen:  Blood Updated:  01/26/18 0156     Glucose 126 (H) mg/dL      BUN 13 mg/dL      Creatinine 0.51 mg/dL      Sodium 140 mmol/L      Potassium 3.7 mmol/L      Chloride 104 mmol/L      CO2 33.3 (H) mmol/L      Calcium 8.5 mg/dL      Total Protein 5.3 (L) g/dL      Albumin 3.10 (L) g/dL      ALT (SGPT) 23 U/L      AST (SGOT) 21 U/L      Alkaline Phosphatase 51 U/L       Note New Reference Ranges        Total Bilirubin 0.4 mg/dL      eGFR Non African Amer 115 mL/min/1.73      Globulin 2.2 gm/dL      A/G Ratio 1.4 (L) g/dL      BUN/Creatinine Ratio 25.5 (H)     Anion Gap 2.7 (L) mmol/L     Narrative:       The MDRD GFR formula is only valid for adults with stable renal function between ages 18 and 70.    Magnesium [[id number]]  (Normal) Collected:  01/26/18 0113    Specimen:  Blood Updated:  01/26/18 0156     Magnesium 1.8 mg/dL     Osmolality, Calculated [[id number]]  (Normal) Collected:  01/26/18 0113    Specimen:  Blood Updated:  01/26/18 0156     Osmolality Calc 281.0 mOsm/kg     Phosphorus [[id number]]  (Abnormal) Collected:  01/26/18  0113    Specimen:  Blood Updated:  01/26/18 0156     Phosphorus 1.5 (L) mg/dL     C-reactive Protein [[id number]]  (Abnormal) Collected:  01/26/18 0113    Specimen:  Blood Updated:  01/26/18 0202     C-Reactive Protein 1.69 (H) mg/dL     POC Glucose Once [[id number]]  (Normal) Collected:  01/26/18 0636    Specimen:  Blood Updated:  01/26/18 0643     Glucose 97 mg/dL     Narrative:       Meter: [id number] : [id number] DRAGAN KNOWLES    POC Glucose Once [[id number]]  (Normal) Collected:  01/26/18 1110    Specimen:  Blood Updated:  01/26/18 1131     Glucose 83 mg/dL     Narrative:       Meter: [id number] : [id number] SANDY CASTORENA    Blood Culture - Blood, [[id number]]  (Normal) Collected:  01/22/18 0931    Specimen:  Blood from Blood, Central Line Updated:  01/26/18 1316     Blood Culture No growth at 4 days          IMAGING  Imaging Results (last 24 hours)     Procedure Component Value Units Date/Time    XR Chest AP [[id number]] Collected:  01/26/18 0846     Updated:  01/26/18 1044    Narrative:       XR CHEST AP-     HISTORY:  Pneumothorax; Z43.1-Encounter for attention to gastrostomy;  J18.1-Lobar pneumonia, unspecified organism; A41.9-Sepsis, unspecified  organism; R41.82-Altered mental status, unspecified; J96.01-Acute  respiratory failure with hypoxia; J96.02-Acute respiratory failure with  hypercapnia; K94.23-Gastrostomy malfunction          COMPARISON: 01/25/2018.      TECHNIQUE: Single frontal view of the chest.     FINDINGS:     No residual pneumothorax seen on today's exam.  The cardiac silhouette is normal. The pulmonary vasculature is  unremarkable.  There is no evidence of an acute osseous abnormality.   There are no suspicious-appearing parenchymal soft tissue nodules.            Impression:       No residual pneumothorax.         This report was finalized on 1/26/2018 10:42 AM by Dr. Fabio Zheng MD.               ASSESSMENT/PLAN  -Nausea and vomiting  -Abdominal pain  -Diarrhea  -Black  stools  -History of gastrostomy tube  -Chronic narcotic use  -Severe sepsis  -Acute respiratory failure  -Left-sided pneumonia-acute exacerbation of COPD  -Metallic encephalopathy  -Right-sided pneumothorax  -Dementia  -History of GERD  -History of colonic diverticulosis    The patient's respiratory status is improving.  We will reassess her safety with anesthesia at the beginning of next week.  Once she has respiratory clearance, patient will be scheduled for an EGD.          Electronically signed by: NATY Chacon

## 2018-01-26 NOTE — PROGRESS NOTES
Right pneumothorax    She is stable and comfortable in the room. Her right lung is expanded on xray. No air leak seen in the pleurovac. The chest tube will be tried on waterseal in the morning.

## 2018-01-27 ENCOUNTER — APPOINTMENT (OUTPATIENT)
Dept: GENERAL RADIOLOGY | Facility: HOSPITAL | Age: 83
End: 2018-01-27

## 2018-01-27 ENCOUNTER — HOSPITAL ENCOUNTER (OUTPATIENT)
Facility: HOSPITAL | Age: 83
Discharge: NURSING, REHAB OR LONG-TERM CARE | End: 2018-02-12
Attending: INTERNAL MEDICINE | Admitting: INTERNAL MEDICINE

## 2018-01-27 VITALS
BODY MASS INDEX: 25.43 KG/M2 | SYSTOLIC BLOOD PRESSURE: 156 MMHG | HEIGHT: 62 IN | RESPIRATION RATE: 18 BRPM | TEMPERATURE: 98.8 F | OXYGEN SATURATION: 97 % | DIASTOLIC BLOOD PRESSURE: 88 MMHG | WEIGHT: 138.2 LBS | HEART RATE: 91 BPM

## 2018-01-27 DIAGNOSIS — K20.90 ESOPHAGITIS: ICD-10-CM

## 2018-01-27 DIAGNOSIS — K29.70 GASTRITIS: ICD-10-CM

## 2018-01-27 LAB
ALBUMIN SERPL-MCNC: 3.1 G/DL (ref 3.4–4.8)
ALBUMIN SERPL-MCNC: 3.2 G/DL (ref 3.4–4.8)
ALBUMIN/GLOB SERPL: 1.3 G/DL (ref 1.5–2.5)
ALBUMIN/GLOB SERPL: 1.5 G/DL (ref 1.5–2.5)
ALP SERPL-CCNC: 48 U/L (ref 35–104)
ALP SERPL-CCNC: 48 U/L (ref 35–104)
ALT SERPL W P-5'-P-CCNC: 19 U/L (ref 10–36)
ALT SERPL W P-5'-P-CCNC: 23 U/L (ref 10–36)
ANION GAP SERPL CALCULATED.3IONS-SCNC: 4.8 MMOL/L (ref 3.6–11.2)
ANION GAP SERPL CALCULATED.3IONS-SCNC: 7.9 MMOL/L (ref 3.6–11.2)
AST SERPL-CCNC: 24 U/L (ref 10–30)
AST SERPL-CCNC: 27 U/L (ref 10–30)
BACTERIA SPEC AEROBE CULT: NORMAL
BACTERIA SPEC AEROBE CULT: NORMAL
BACTERIA UR QL AUTO: ABNORMAL /HPF
BASOPHILS # BLD AUTO: 0 10*3/MM3 (ref 0–0.3)
BASOPHILS # BLD AUTO: 0 10*3/MM3 (ref 0–0.3)
BASOPHILS NFR BLD AUTO: 0 % (ref 0–2)
BASOPHILS NFR BLD AUTO: 0 % (ref 0–2)
BILIRUB SERPL-MCNC: 0.3 MG/DL (ref 0.2–1.8)
BILIRUB SERPL-MCNC: 0.4 MG/DL (ref 0.2–1.8)
BILIRUB UR QL STRIP: NEGATIVE
BUN BLD-MCNC: 11 MG/DL (ref 7–21)
BUN BLD-MCNC: 12 MG/DL (ref 7–21)
BUN/CREAT SERPL: 20 (ref 7–25)
BUN/CREAT SERPL: 22.6 (ref 7–25)
CALCIUM SPEC-SCNC: 8.4 MG/DL (ref 7.7–10)
CALCIUM SPEC-SCNC: 8.5 MG/DL (ref 7.7–10)
CHLORIDE SERPL-SCNC: 100 MMOL/L (ref 99–112)
CHLORIDE SERPL-SCNC: 98 MMOL/L (ref 99–112)
CLARITY UR: ABNORMAL
CO2 SERPL-SCNC: 32.1 MMOL/L (ref 24.3–31.9)
CO2 SERPL-SCNC: 34.2 MMOL/L (ref 24.3–31.9)
COLOR UR: YELLOW
CREAT BLD-MCNC: 0.53 MG/DL (ref 0.43–1.29)
CREAT BLD-MCNC: 0.55 MG/DL (ref 0.43–1.29)
CRP SERPL-MCNC: 0.8 MG/DL (ref 0–0.99)
CRP SERPL-MCNC: <0.5 MG/DL (ref 0–0.99)
DEPRECATED RDW RBC AUTO: 43.9 FL (ref 37–54)
DEPRECATED RDW RBC AUTO: 44.6 FL (ref 37–54)
EOSINOPHIL # BLD AUTO: 0 10*3/MM3 (ref 0–0.7)
EOSINOPHIL # BLD AUTO: 0 10*3/MM3 (ref 0–0.7)
EOSINOPHIL NFR BLD AUTO: 0 % (ref 0–7)
EOSINOPHIL NFR BLD AUTO: 0 % (ref 0–7)
ERYTHROCYTE [DISTWIDTH] IN BLOOD BY AUTOMATED COUNT: 14 % (ref 11.5–14.5)
ERYTHROCYTE [DISTWIDTH] IN BLOOD BY AUTOMATED COUNT: 14 % (ref 11.5–14.5)
GFR SERPL CREATININE-BSD FRML MDRD: 106 ML/MIN/1.73
GFR SERPL CREATININE-BSD FRML MDRD: 110 ML/MIN/1.73
GLOBULIN UR ELPH-MCNC: 2.1 GM/DL
GLOBULIN UR ELPH-MCNC: 2.3 GM/DL
GLUCOSE BLD-MCNC: 111 MG/DL (ref 70–110)
GLUCOSE BLD-MCNC: 91 MG/DL (ref 70–110)
GLUCOSE BLDC GLUCOMTR-MCNC: 85 MG/DL (ref 70–130)
GLUCOSE BLDC GLUCOMTR-MCNC: 91 MG/DL (ref 70–130)
GLUCOSE BLDC GLUCOMTR-MCNC: 93 MG/DL (ref 70–130)
GLUCOSE UR STRIP-MCNC: NEGATIVE MG/DL
HCT VFR BLD AUTO: 38.9 % (ref 37–47)
HCT VFR BLD AUTO: 40.3 % (ref 37–47)
HGB BLD-MCNC: 12.3 G/DL (ref 12–16)
HGB BLD-MCNC: 13 G/DL (ref 12–16)
HGB UR QL STRIP.AUTO: ABNORMAL
HYALINE CASTS UR QL AUTO: ABNORMAL /LPF
IMM GRANULOCYTES # BLD: 0.03 10*3/MM3 (ref 0–0.03)
IMM GRANULOCYTES # BLD: 0.03 10*3/MM3 (ref 0–0.03)
IMM GRANULOCYTES NFR BLD: 0.6 % (ref 0–0.5)
IMM GRANULOCYTES NFR BLD: 0.6 % (ref 0–0.5)
KETONES UR QL STRIP: NEGATIVE
LEUKOCYTE ESTERASE UR QL STRIP.AUTO: ABNORMAL
LYMPHOCYTES # BLD AUTO: 0.59 10*3/MM3 (ref 1–3)
LYMPHOCYTES # BLD AUTO: 1.39 10*3/MM3 (ref 1–3)
LYMPHOCYTES NFR BLD AUTO: 11.1 % (ref 16–46)
LYMPHOCYTES NFR BLD AUTO: 26.4 % (ref 16–46)
MAGNESIUM SERPL-MCNC: 1.7 MG/DL (ref 1.7–2.6)
MCH RBC QN AUTO: 28.1 PG (ref 27–33)
MCH RBC QN AUTO: 28.4 PG (ref 27–33)
MCHC RBC AUTO-ENTMCNC: 31.6 G/DL (ref 33–37)
MCHC RBC AUTO-ENTMCNC: 32.3 G/DL (ref 33–37)
MCV RBC AUTO: 88.2 FL (ref 80–94)
MCV RBC AUTO: 88.8 FL (ref 80–94)
MONOCYTES # BLD AUTO: 0.32 10*3/MM3 (ref 0.1–0.9)
MONOCYTES # BLD AUTO: 0.76 10*3/MM3 (ref 0.1–0.9)
MONOCYTES NFR BLD AUTO: 14.4 % (ref 0–12)
MONOCYTES NFR BLD AUTO: 6 % (ref 0–12)
MYOGLOBIN SERPL-MCNC: 86 NG/ML (ref 0–109)
NEUTROPHILS # BLD AUTO: 3.08 10*3/MM3 (ref 1.4–6.5)
NEUTROPHILS # BLD AUTO: 4.38 10*3/MM3 (ref 1.4–6.5)
NEUTROPHILS NFR BLD AUTO: 58.6 % (ref 40–75)
NEUTROPHILS NFR BLD AUTO: 82.3 % (ref 40–75)
NITRITE UR QL STRIP: NEGATIVE
OSMOLALITY SERPL CALC.SUM OF ELEC: 273.9 MOSM/KG (ref 273–305)
OSMOLALITY SERPL CALC.SUM OF ELEC: 278.7 MOSM/KG (ref 273–305)
PH UR STRIP.AUTO: 6.5 [PH] (ref 5–8)
PHOSPHATE SERPL-MCNC: 2.6 MG/DL (ref 2.7–4.5)
PLATELET # BLD AUTO: 222 10*3/MM3 (ref 130–400)
PLATELET # BLD AUTO: 231 10*3/MM3 (ref 130–400)
PMV BLD AUTO: 9.2 FL (ref 6–10)
PMV BLD AUTO: 9.4 FL (ref 6–10)
POTASSIUM BLD-SCNC: 3.2 MMOL/L (ref 3.5–5.3)
POTASSIUM BLD-SCNC: 3.9 MMOL/L (ref 3.5–5.3)
POTASSIUM BLD-SCNC: 4.1 MMOL/L (ref 3.5–5.3)
PROT SERPL-MCNC: 5.3 G/DL (ref 6–8)
PROT SERPL-MCNC: 5.4 G/DL (ref 6–8)
PROT UR QL STRIP: NEGATIVE
RBC # BLD AUTO: 4.38 10*6/MM3 (ref 4.2–5.4)
RBC # BLD AUTO: 4.57 10*6/MM3 (ref 4.2–5.4)
RBC # UR: ABNORMAL /HPF
REF LAB TEST METHOD: ABNORMAL
SODIUM BLD-SCNC: 137 MMOL/L (ref 135–153)
SODIUM BLD-SCNC: 140 MMOL/L (ref 135–153)
SP GR UR STRIP: 1.01 (ref 1–1.03)
SQUAMOUS #/AREA URNS HPF: ABNORMAL /HPF
UROBILINOGEN UR QL STRIP: ABNORMAL
WBC NRBC COR # BLD: 5.26 10*3/MM3 (ref 4.5–12.5)
WBC NRBC COR # BLD: 5.32 10*3/MM3 (ref 4.5–12.5)
WBC UR QL AUTO: ABNORMAL /HPF
YEAST URNS QL MICRO: ABNORMAL /HPF

## 2018-01-27 PROCEDURE — 71045 X-RAY EXAM CHEST 1 VIEW: CPT

## 2018-01-27 PROCEDURE — 81001 URINALYSIS AUTO W/SCOPE: CPT | Performed by: INTERNAL MEDICINE

## 2018-01-27 PROCEDURE — 99231 SBSQ HOSP IP/OBS SF/LOW 25: CPT | Performed by: SURGERY

## 2018-01-27 PROCEDURE — 25010000002 VANCOMYCIN PER 500 MG: Performed by: INTERNAL MEDICINE

## 2018-01-27 PROCEDURE — 85025 COMPLETE CBC W/AUTO DIFF WBC: CPT | Performed by: INTERNAL MEDICINE

## 2018-01-27 PROCEDURE — 71045 X-RAY EXAM CHEST 1 VIEW: CPT | Performed by: RADIOLOGY

## 2018-01-27 PROCEDURE — 83874 ASSAY OF MYOGLOBIN: CPT | Performed by: INTERNAL MEDICINE

## 2018-01-27 PROCEDURE — 86140 C-REACTIVE PROTEIN: CPT | Performed by: INTERNAL MEDICINE

## 2018-01-27 PROCEDURE — 82962 GLUCOSE BLOOD TEST: CPT

## 2018-01-27 PROCEDURE — 25010000002 MAGNESIUM SULFATE 2 GM/50ML SOLUTION: Performed by: HOSPITALIST

## 2018-01-27 PROCEDURE — 25010000003 POTASSIUM CHLORIDE 10 MEQ/100ML SOLUTION: Performed by: HOSPITALIST

## 2018-01-27 PROCEDURE — 94799 UNLISTED PULMONARY SVC/PX: CPT

## 2018-01-27 PROCEDURE — 63710000001 PREDNISONE PER 1 MG: Performed by: NURSE PRACTITIONER

## 2018-01-27 PROCEDURE — 84134 ASSAY OF PREALBUMIN: CPT | Performed by: INTERNAL MEDICINE

## 2018-01-27 PROCEDURE — 25010000002 PROMETHAZINE PER 50 MG: Performed by: PHYSICIAN ASSISTANT

## 2018-01-27 PROCEDURE — 93005 ELECTROCARDIOGRAM TRACING: CPT | Performed by: INTERNAL MEDICINE

## 2018-01-27 PROCEDURE — 80053 COMPREHEN METABOLIC PANEL: CPT | Performed by: INTERNAL MEDICINE

## 2018-01-27 PROCEDURE — 25010000002 HEPARIN (PORCINE) PER 1000 UNITS: Performed by: INTERNAL MEDICINE

## 2018-01-27 PROCEDURE — 83735 ASSAY OF MAGNESIUM: CPT | Performed by: INTERNAL MEDICINE

## 2018-01-27 PROCEDURE — 99232 SBSQ HOSP IP/OBS MODERATE 35: CPT | Performed by: INTERNAL MEDICINE

## 2018-01-27 PROCEDURE — 99239 HOSP IP/OBS DSCHRG MGMT >30: CPT | Performed by: INTERNAL MEDICINE

## 2018-01-27 PROCEDURE — 93010 ELECTROCARDIOGRAM REPORT: CPT | Performed by: INTERNAL MEDICINE

## 2018-01-27 PROCEDURE — 84100 ASSAY OF PHOSPHORUS: CPT | Performed by: INTERNAL MEDICINE

## 2018-01-27 PROCEDURE — 87086 URINE CULTURE/COLONY COUNT: CPT | Performed by: INTERNAL MEDICINE

## 2018-01-27 PROCEDURE — 84132 ASSAY OF SERUM POTASSIUM: CPT | Performed by: INTERNAL MEDICINE

## 2018-01-27 RX ORDER — MAGNESIUM SULFATE HEPTAHYDRATE 40 MG/ML
2 INJECTION, SOLUTION INTRAVENOUS ONCE
Status: COMPLETED | OUTPATIENT
Start: 2018-01-27 | End: 2018-01-27

## 2018-01-27 RX ORDER — POTASSIUM CHLORIDE 20 MEQ/1
40 TABLET, EXTENDED RELEASE ORAL EVERY 4 HOURS
Status: DISCONTINUED | OUTPATIENT
Start: 2018-01-27 | End: 2018-01-27 | Stop reason: ALTCHOICE

## 2018-01-27 RX ORDER — PROMETHAZINE HYDROCHLORIDE 25 MG/1
25 TABLET ORAL EVERY 6 HOURS PRN
Status: DISCONTINUED | OUTPATIENT
Start: 2018-01-27 | End: 2018-01-27 | Stop reason: HOSPADM

## 2018-01-27 RX ORDER — HEPARIN SODIUM 5000 [USP'U]/ML
5000 INJECTION, SOLUTION INTRAVENOUS; SUBCUTANEOUS EVERY 12 HOURS SCHEDULED
Status: ON HOLD
Start: 2018-01-27 | End: 2019-01-01

## 2018-01-27 RX ORDER — SODIUM CHLORIDE 9 MG/ML
INJECTION, SOLUTION INTRAVENOUS
Status: COMPLETED
Start: 2018-01-27 | End: 2018-01-27

## 2018-01-27 RX ORDER — POTASSIUM CHLORIDE 7.45 MG/ML
10 INJECTION INTRAVENOUS
Status: COMPLETED | OUTPATIENT
Start: 2018-01-27 | End: 2018-01-27

## 2018-01-27 RX ORDER — ATORVASTATIN CALCIUM 20 MG/1
20 TABLET, FILM COATED ORAL NIGHTLY
Start: 2018-01-27

## 2018-01-27 RX ORDER — HYDROCODONE BITARTRATE AND ACETAMINOPHEN 5; 325 MG/1; MG/1
1 TABLET ORAL EVERY 6 HOURS PRN
Start: 2018-01-27 | End: 2018-01-01

## 2018-01-27 RX ORDER — PREDNISONE 20 MG/1
20 TABLET ORAL
Start: 2018-01-28 | End: 2019-01-01

## 2018-01-27 RX ORDER — IPRATROPIUM BROMIDE AND ALBUTEROL SULFATE 2.5; .5 MG/3ML; MG/3ML
3 SOLUTION RESPIRATORY (INHALATION)
Qty: 360 ML | Status: ON HOLD
Start: 2018-01-27 | End: 2019-01-01

## 2018-01-27 RX ADMIN — IPRATROPIUM BROMIDE AND ALBUTEROL SULFATE 3 ML: .5; 3 SOLUTION RESPIRATORY (INHALATION) at 06:22

## 2018-01-27 RX ADMIN — Medication: at 08:13

## 2018-01-27 RX ADMIN — PANTOPRAZOLE SODIUM 40 MG: 40 TABLET, DELAYED RELEASE ORAL at 08:10

## 2018-01-27 RX ADMIN — FERROUS SULFATE TAB 325 MG (65 MG ELEMENTAL FE) 325 MG: 325 (65 FE) TAB at 08:10

## 2018-01-27 RX ADMIN — KETOTIFEN FUMARATE 1 DROP: 0.35 SOLUTION/ DROPS OPHTHALMIC at 08:13

## 2018-01-27 RX ADMIN — MAGNESIUM SULFATE IN WATER 2 G: 40 INJECTION, SOLUTION INTRAVENOUS at 04:52

## 2018-01-27 RX ADMIN — IPRATROPIUM BROMIDE AND ALBUTEROL SULFATE 3 ML: .5; 3 SOLUTION RESPIRATORY (INHALATION) at 12:27

## 2018-01-27 RX ADMIN — Medication 1000 MCG: at 08:10

## 2018-01-27 RX ADMIN — Medication 1 CAPSULE: at 08:10

## 2018-01-27 RX ADMIN — Medication 81 MG: at 08:11

## 2018-01-27 RX ADMIN — HYDROCODONE BITARTRATE AND ACETAMINOPHEN 1 TABLET: 5; 325 TABLET ORAL at 11:25

## 2018-01-27 RX ADMIN — CETIRIZINE HYDROCHLORIDE 5 MG: 10 TABLET ORAL at 08:11

## 2018-01-27 RX ADMIN — BUSPIRONE HYDROCHLORIDE 5 MG: 5 TABLET ORAL at 08:11

## 2018-01-27 RX ADMIN — BUDESONIDE AND FORMOTEROL FUMARATE DIHYDRATE 2 PUFF: 160; 4.5 AEROSOL RESPIRATORY (INHALATION) at 06:22

## 2018-01-27 RX ADMIN — METRONIDAZOLE 500 MG: 500 INJECTION, SOLUTION INTRAVENOUS at 04:00

## 2018-01-27 RX ADMIN — ONDANSETRON 4 MG: 4 TABLET, FILM COATED ORAL at 11:26

## 2018-01-27 RX ADMIN — PREDNISONE 20 MG: 20 TABLET ORAL at 08:13

## 2018-01-27 RX ADMIN — CHOLECALCIFEROL TAB 10 MCG (400 UNIT) 1000 UNITS: 10 TAB at 08:11

## 2018-01-27 RX ADMIN — POTASSIUM CHLORIDE 10 MEQ: 10 INJECTION, SOLUTION INTRAVENOUS at 04:52

## 2018-01-27 RX ADMIN — GUAIFENESIN 400 MG: 200 TABLET ORAL at 08:10

## 2018-01-27 RX ADMIN — MEMANTINE HYDROCHLORIDE 10 MG: 10 TABLET, FILM COATED ORAL at 08:10

## 2018-01-27 RX ADMIN — POTASSIUM CHLORIDE 10 MEQ: 10 INJECTION, SOLUTION INTRAVENOUS at 05:58

## 2018-01-27 RX ADMIN — SODIUM CHLORIDE: 9 INJECTION, SOLUTION INTRAVENOUS at 14:15

## 2018-01-27 RX ADMIN — POTASSIUM CHLORIDE 10 MEQ: 10 INJECTION, SOLUTION INTRAVENOUS at 08:02

## 2018-01-27 RX ADMIN — Medication: at 12:31

## 2018-01-27 RX ADMIN — AMLODIPINE BESYLATE 5 MG: 5 TABLET ORAL at 08:11

## 2018-01-27 RX ADMIN — HEPARIN SODIUM 5000 UNITS: 5000 INJECTION, SOLUTION INTRAVENOUS; SUBCUTANEOUS at 08:10

## 2018-01-27 RX ADMIN — METRONIDAZOLE 500 MG: 500 INJECTION, SOLUTION INTRAVENOUS at 13:45

## 2018-01-27 RX ADMIN — VANCOMYCIN HYDROCHLORIDE 1000 MG: 5 INJECTION, POWDER, LYOPHILIZED, FOR SOLUTION INTRAVENOUS at 03:41

## 2018-01-27 RX ADMIN — IPRATROPIUM BROMIDE AND ALBUTEROL SULFATE 3 ML: .5; 3 SOLUTION RESPIRATORY (INHALATION) at 00:24

## 2018-01-27 RX ADMIN — PROMETHAZINE HYDROCHLORIDE 12.5 MG: 25 INJECTION INTRAMUSCULAR; INTRAVENOUS at 14:13

## 2018-01-27 RX ADMIN — POTASSIUM CHLORIDE 10 MEQ: 10 INJECTION, SOLUTION INTRAVENOUS at 06:41

## 2018-01-27 RX ADMIN — ESCITALOPRAM 5 MG: 10 TABLET, FILM COATED ORAL at 08:10

## 2018-01-27 RX ADMIN — HYDROCODONE BITARTRATE AND ACETAMINOPHEN 1 TABLET: 5; 325 TABLET ORAL at 06:13

## 2018-01-27 NOTE — DISCHARGE SUMMARY
Date of Admission: 1/22/2018    Date of Discharge:  1/27/2018    PCP: Hank Cabral MD    Admission Diagnosis:   Please see admission H&P    Discharge Diagnosis:   Severe sepsis  Acute hypoxic and hypercapnic respiratory failure  Left-sided pneumonia, HCAP  Acute exacerbation of COPD  Metabolic encephalopathy  Right-sided pneumothorax  Indeterminate range troponin elevation  Arthritis  Concern for medication misuse upon admission  Nausea, abdominal pain and diarrhea  Electrolyte abnormalities including hypokalemia and hypophosphatemia     Procedures Performed:  1/22/18: Right subclavian central line placement in the ED  1/24/18: Right-sided chest tube placement   1/26/18: Removal of previous chest tube and placement of new right-sided chest tube  1/27/18: Removal of right-sided chest tube     Consults:   Consults     Date and Time Order Name Status Description    1/25/2018 0855 Inpatient Consult to Gastroenterology Completed     1/25/2018 0706 Inpatient Consult to General Surgery      1/23/2018 1204 Inpatient Consult to Cardiology      1/22/2018 1928 Inpatient Consult to Pulmonology Completed     1/22/2018 1406 Hospitalist (on-call MD unless specified)              History of Present Illness:  Amy Do is a 82 y.o. female who presented to [facility] ED via EMS from a local NH for evaluation of AMS. Please see admission H&P for complete details.     In the ED, she was confused upon arrival. Per report, her confusion did improve with Narcan. She was found to be acidotic, hypoxic and hypercapnic. Her butrans patch was removed while in the ED. CT head was negative for any acute changes. No acute ischemic changes were noted on EKG. Additional workup revealed indeterminate range troponin elevation, JOSÉ MIGUEL and a left-sided pneumonia. Staff had difficulty obtaining peripheral IV access and a right-sided subclavian central line was placed with no pneumothorax on post-procedural CXR.        Hospital Course  Amy Do was  admitted to the CCU for further evaluation and treatment. She had initially been placed on a Venti mask in the ED but was transitioned to BiPAP soon after admission in the setting of her mixed acidosis and hypercapnia. Broad spectrum IV antibiotics were initiated after cultures had been obtained in the ED. Systemic steroids and scheduled nebs were also added for a COPD exacerbation. Her home medications were reviewed with any potential sedating medications held upon admission. Pulm was consulted for further input.     Repeat ABG's revealed improvement in her hypercapnia and acidosis. Pulm then transitioned her to HFNC as she remained hypoxic. She experienced some diarrhea and stool studies were obtained, including C diff which was negative. All of her cultures would remain negative and her antibiotics were deescalated during her hospitalization.     Atypical workup was obtained which was negative. Once her mental status improved a speech eval was completed with no evidence of aspiration. PRN Norco was added to avoid any opioid withdrawal once her confusion had resolved. Repeat labs revealed improvement in her inflammatory markers and her steroids were tapered once her COPD exacerbation improved.     Serial CE's were followed and trended downward. An echo was obtained revealing an EF of 66-70%, grade I diastolic dysfunction, mild AR, mild AS, mild MR, mild MS, mild TR and moderately elevated RVSP. Cardiology was consulted with recommendations to proceed with a nuclear stress test once her respiratory status improved.     She continued to report some dyspnea both at rest and with minimal exertion. Repeat CXR was obtained revealing a right-sided pneumothorax. Pulm then placed a right-sided chest tube with improvement in the pneumothorax on follow up CXR. However, repeat CXR the following morning revealed that the chest tube had become dislodged with worsening pneumothorax. General surgery was then consulted who removed  the initial chest tube and placed another chest tube. Repeat CXR revealed resolution of the pneumothorax. Her dyspnea and hypoxia then improved and she was weaned off the HFNC. She was later transferred out of the CCU to the telemetry floor. As it was felt she may require a prolonged hospitalization an LTAC referral was placed and SS began working to arrange possible placement at [facility]. She continued to experience some nausea, abdominal pain and diarrhea throughout her hospitalization. CT abd/pelvis revealed sigmoid diverticulosis but no evidence of diverticulitis. GI was consulted with recommendations for conservative treatment until her respiratory status improved, with plans to consider endoscopy once she was more stable.     Ms. Do was seen and examined with ANTWAN June on 1/27/18. She remained hemodynamically stable with stable routine AM labs. The chest tube remained in place on waterseal with no evidence of residual pneumothorax on follow up CXR. She had been approved for placement at [facility] and was agreeable for discharge/transfer. Arrangements were then made for her to be transported to [facility] for further evaluation and treatment. Surgery did remove her chest tube prior to her discharge.     Condition on Discharge:  Stable    Vital Signs  Vitals:    01/27/18 0622   BP:    Pulse: 84   Resp: 18   Temp:    SpO2: 97%       Physical Exam:  General:    Awake, alert, in no acute distress, chronically ill appearing   Heart:      Normal S1 and S2. Regular rate and rhythm. No significant murmur, rubs or gallops appreciated.   Lungs:     Respirations regular, even and unlabored. Clear to auscultation on right side. LLL rhonchi overall improved.    Abdomen:   Soft. Mild generalized TTP, worse in RENÉE region. No guarding, rebound tenderness or  organomegaly noted. Bowel sounds present x 4.   Extremities:  No clubbing, cyanosis or edema noted. Moves UE and LE equally B/L.         [facility]   [facility]        Discharge Medications:   Amy Do   Home Medication Instructions DEMETRIUS:[id number]    Printed on:01/27/18 8129   Medication Information                      acetaminophen (TYLENOL) 500 MG tablet  Take 500 mg by mouth Every 4 (Four) Hours As Needed for Mild Pain  or Fever.             acidophilus (FLORANEX) tablet tablet  Take 1 tablet by mouth Daily.             amLODIPine (NORVASC) 5 MG tablet  Take 5 mg by mouth daily.             ARTIFICIAL TEAR SOLUTION OP  Apply 1 drop to eye Every 4 (Four) Hours As Needed (dryness).             aspirin 81 MG EC tablet  Take 81 mg by mouth daily.             atorvastatin (LIPITOR) 20 MG tablet  Take 1 tablet by mouth Every Night.             azelastine (OPTIVAR) 0.05 % ophthalmic solution  Administer 1 drop to both eyes 2 (Two) Times a Day.             bisacodyl (DULCOLAX) 10 MG suppository  Insert 10 mg into the rectum Daily As Needed for Constipation.             busPIRone (BUSPAR) 5 MG tablet  Take 5 mg by mouth 2 (Two) Times a Day.             cholecalciferol (VITAMIN D3) 1000 units tablet  Take 1,000 Units by mouth Daily.             Cyanocobalamin (VITAMIN B 12 PO)  Take 1,000 mcg by mouth daily.             docusate sodium (COLACE) 250 MG capsule  Take 250 mg by mouth Every 12 (Twelve) Hours As Needed for Constipation.             donepezil (ARICEPT) 10 MG tablet  Take 10 mg by mouth every night.             escitalopram (LEXAPRO) 5 MG tablet  Take 5 mg by mouth Daily.             ferrous sulfate 325 (65 FE) MG tablet  Take 325 mg by mouth 2 (two) times a day.             fluticasone-salmeterol (ADVAIR DISKUS) 250-50 MCG/DOSE DISKUS  Inhale 1 puff 2 (Two) Times a Day.             guaiFENesin 200 MG tablet  Take 400 mg by mouth 2 (two) times a day.             Heparin Sodium, Porcine, (HEPARIN, PORCINE,) 5000 UNIT/ML injection  Inject 1 mL under the skin Every 12 (Twelve) Hours.             HYDROcodone-acetaminophen (NORCO) 5-325 MG per tablet  Take 1 tablet by  mouth Every 6 (Six) Hours As Needed for Moderate Pain  for up to 6 days.             ipratropium-albuterol (DUO-NEB) 0.5-2.5 mg/mL nebulizer  Take 3 mL by nebulization Every 6 (Six) Hours.             lactulose (CHRONULAC) 10 GM/15ML solution  Take 10 g by mouth Daily As Needed (constipation).             leflunomide (ARAVA) 20 MG tablet  Take 20 mg by mouth daily.             linaclotide (LINZESS) 72 MCG capsule capsule  Take 72 mcg by mouth 3 (Three) Times a Week.             loratadine (CLARITIN) 10 MG tablet  Take 10 mg by mouth Every Night.             memantine (NAMENDA) 10 MG tablet  Take 10 mg by mouth 2 (two) times a day.             metroNIDAZOLE (FLAGYL) 5-0.79 MG/ML-% IVPB  Infuse 100 mL into a venous catheter Every 8 (Eight) Hours for 28 doses. Indications: Pneumonia             nitroglycerin (NITROSTAT) 0.4 MG SL tablet  Place 0.4 mg under the tongue every 5 (five) minutes as needed for chest pain. Take no more than 3 doses in 15 minutes.             ondansetron (ZOFRAN) 4 MG tablet  Take 4 mg by mouth Every 6 (Six) Hours As Needed for Nausea or Vomiting.             pantoprazole (PROTONIX) 40 MG EC tablet  Take 40 mg by mouth 2 (two) times a day.             phenylephrine-mineral oil-petrolatum (HEMORRHOIDAL) 0.25-14-74.9 % ointment hemorrhoidal ointment  Insert 1 application into the rectum Every 6 (Six) Hours As Needed (hemorrhoids).             polyethylene glycol (MIRALAX) packet  Take 17 g by mouth Daily As Needed (constipation).             predniSONE (DELTASONE) 20 MG tablet  Take 1 tablet by mouth Daily With Breakfast.             vancomycin 1,000 mg in sodium chloride 0.9 % 250 mL IVPB  Infuse 1,000 mg into a venous catheter Every 18 (Eighteen) Hours for 12 doses. Indications: Pneumonia             Vancomycin HCl in Dextrose (PHARMACY TO DOSE VANCOMYCIN)  Continuous As Needed (Pneumonia) for up to 9 days.                   Discharge Diet:        Dietary Orders            Start     Ordered     01/25/18 1800  Dietary Nutrition Supplements Magic Cup  Daily With Breakfast, Lunch & Dinner     Comments:  Please send with all meals.  Thank you.   Question:  Select Supplement  Answer:  Magic Cup    01/25/18 1424    01/25/18 1424  Diet Soft Texture; Chopped; Thin  Diet Effective Now     Comments:  Please send Magic Cup with all meals.   Question Answer Comment   Diet Texture / Consistency Soft Texture    Select Texture: Chopped    Fluid Consistency Thin        01/25/18 1424          Activity at Discharge:  activity as tolerated    Follow-up Appointments:  Follow-up Information     Follow up with Hank Cabral MD .    Specialty:  Internal Medicine    Contact information:    [address] [place] [address]  [phone]                Test Results Pending at Discharge:  None     Aroldo Gonsalez DO  01/27/18  8:52 AM      Time: Greater than 30 minutes spent on this discharge.

## 2018-01-27 NOTE — PROGRESS NOTES
LOS: 5 days   Patient Care Team:  Hank Cabral MD as PCP - General  Hank Cabral MD as PCP - Family Medicine      Subjective     Admission information:    Ms. Do is a pleasant 82-year-old  female with no history of known coronary artery disease, was admitted on 1/20/2018 after being transferred from a local nursing home with altered mental status.  She was found to be in acute hypoxic/hypercapnic respiratory failure along with left-sided pneumonia and exacerbation of COPD.she is also noted to have mild elevation of troponin up to 0.207 and trending down.  On further questioning Ms. Do gives a history of having had recent left-sided chest pains which she describes as tightness.there are moderate intensity.  There is some ascitic shortness of breath but no sweating.  She currently denies any chest pain or discomfort.  Her EKG revealed normal sinus rhythm with no significant ischemic changes of myocardial ischemia or infarction.  She denies any previous history of known coronary artery disease or any other heart problems.    Interval History:     The patient appears to be doing much better at this time.  She denies any chest pain, shortness breath or palpitations.      History taken from: patient chart family    Vital Signs  Temp:  [98.1 °F (36.7 °C)-99 °F (37.2 °C)] 98.8 °F (37.1 °C)  Heart Rate:  [] 84  Resp:  [15-22] 18  BP: (122-172)/(72-91) 163/77    Physical Exam:     Physical Exam   Constitutional: She is oriented to person, place, and time. She appears well-developed and well-nourished.   Elderly white female lying in bed in high flow nasal cannula.   HENT:   Mouth/Throat: Oropharynx is clear and moist.   Eyes: EOM are normal. Pupils are equal, round, and reactive to light.   Neck: Neck supple. No JVD present. No tracheal deviation present. No thyromegaly present.   Cardiovascular: Normal rate, regular rhythm, S1 normal and S2 normal.  Exam reveals no gallop and no friction rub.     No murmur heard.  Pulmonary/Chest: Effort normal. No respiratory distress. She has no wheezes. She has rales.   Abdominal: Soft. Bowel sounds are normal. She exhibits no mass. There is tenderness.   Musculoskeletal: Normal range of motion. She exhibits no edema.   Lymphadenopathy:     She has no cervical adenopathy.   Neurological: She is alert and oriented to person, place, and time.   Skin: Skin is warm and dry. No rash noted.   Psychiatric: She has a normal mood and affect.       Results Review:     I reviewed the patient's new clinical results.  I reviewed the patient's new imaging results and agree with the interpretation.  I reviewed the patient's other test results and agree with the interpretation  I personally viewed and interpreted the patient's EKG/Telemetry data    Medication:  Scheduled Meds:    amLODIPine 5 mg Oral Daily   aspirin 81 mg Oral Daily   atorvastatin 20 mg Oral Nightly   budesonide-formoterol 2 puff Inhalation BID - RT   busPIRone 5 mg Oral Q12H   cetirizine 5 mg Oral Daily   cholecalciferol 1,000 Units Oral Daily   donepezil 10 mg Oral Nightly   escitalopram 5 mg Oral Daily   ferrous sulfate 325 mg Oral BID With Meals   guaiFENesin 400 mg Oral BID   heparin (porcine) 5,000 Units Subcutaneous Q12H   insulin aspart 0-7 Units Subcutaneous 4x Daily AC & at Bedtime   ipratropium-albuterol 3 mL Nebulization Q6H - RT   ketotifen 1 drop Both Eyes BID   lactobacillus acidophilus 1 capsule Oral Daily   magic barrier cream  Topical 4x Daily   memantine 10 mg Oral BID   metroNIDAZOLE 500 mg Intravenous Q8H   pantoprazole 40 mg Oral BID   predniSONE 20 mg Oral Daily With Breakfast   vancomycin 1,000 mg Intravenous Q18H   vitamin B-12 1,000 mcg Oral Daily     Continuous Infusions:    Pharmacy to dose vancomycin      PRN Meds:.bisacodyl  •  carboxymethylcellulose  •  dextrose  •  dextrose  •  dextrose  •  dextrose  •  docusate sodium  •  glucagon (human recombinant)  •  glucagon (human recombinant)  •   HYDROcodone-acetaminophen  •  lactulose  •  magnesium sulfate **OR** magnesium sulfate in D5W 1g/100mL (PREMIX) **OR** magnesium sulfate  •  nitroglycerin  •  ondansetron  •  Pharmacy to dose vancomycin  •  polyethylene glycol  •  potassium chloride **OR** potassium chloride **OR** potassium chloride  •  potassium phosphate infusion greater than 15 mMoles **OR** potassium phosphate infusion greater than 15 mMoles **OR** potassium phosphate **OR** sodium phosphate IVPB **OR** sodium phosphate IVPB **OR** sodium phosphate IVPB  •  sodium chloride  •  sodium chloride    Telemetry: Sinus rhythm in the 80s to 90s.      Assessment/Plan     1. Indeterminate range troponin which is probably due to sepsis from pneumonia.  2. No history of known coronary artery disease.  3. Acute hypoxic/hypercapnic respiratory failure secondary to exacerbation of COPD from pneumonia.  4. Severe sepsis secondary to pneumonia.     Recommendations:     1.  Troponin elevation: Patient with mild troponin elevation is likely related to sepsis.  However occult ischemia cannot be ruled out.  I once again discussed with her the option of undergoing a stress test the patient is refusing this point.  Since her EKG shows no significant changes.  She is chest pain-free at this time would consider stress test after discharge if the patient is agreeable to it..    Disposition: The patient appears stable from a cardiology standpoint.  At this point will sign off.  I appreciate the opportunity participate in this patient's cardiovascular care.  She'll need to follow-up with cardiology in 2 weeks after discharge if she is willing to undergo stress test.    Sundar Bueno MD  01/27/18  10:11 AM    Dragon disclaimer:  Much of this encounter note is an electronic transcription/translation of spoken language to printed text. The electronic translation of spoken language may permit erroneous, or at times, nonsensical words or phrases to be  inadvertently transcribed; Although I have reviewed the note for such errors, some may still exist.

## 2018-01-27 NOTE — PLAN OF CARE
Problem: Skin Integrity Impairment, Risk/Actual (Adult)  Goal: Identify Related Risk Factors and Signs and Symptoms  Outcome: Ongoing (interventions implemented as appropriate)

## 2018-01-27 NOTE — PROGRESS NOTES
Chief complaint: Right pneumothorax    No acute events.  Chest x-ray shows no pneumothorax with chest tube to waterseal.    Afebrile, vital signs stable  Regular rate and rhythm  Clear to auscultation bilaterally, right chest tube without air leak on waterseal    82-year-old female with right pneumothorax.  Her chest tube is in place on waterseal with no evidence of residual pneumothorax.  The chest tube is been removed at the bedside and will require interval chest x-ray in 4 hours

## 2018-01-28 ENCOUNTER — APPOINTMENT (OUTPATIENT)
Dept: GENERAL RADIOLOGY | Facility: HOSPITAL | Age: 83
End: 2018-01-28

## 2018-01-28 LAB
CHOLEST SERPL-MCNC: 85 MG/DL (ref 0–200)
GLUCOSE BLDC GLUCOMTR-MCNC: 101 MG/DL (ref 70–130)
GLUCOSE BLDC GLUCOMTR-MCNC: 114 MG/DL (ref 70–130)
GLUCOSE BLDC GLUCOMTR-MCNC: 94 MG/DL (ref 70–130)
GLUCOSE BLDC GLUCOMTR-MCNC: 98 MG/DL (ref 70–130)
HDLC SERPL-MCNC: 28 MG/DL (ref 60–100)
LDLC SERPL CALC-MCNC: 26 MG/DL (ref 0–100)
LDLC/HDLC SERPL: 0.94 {RATIO}
MAGNESIUM SERPL-MCNC: 2 MG/DL (ref 1.7–2.6)
TRIGL SERPL-MCNC: 153 MG/DL (ref 0–150)
TROPONIN I SERPL-MCNC: 0.08 NG/ML
VLDLC SERPL-MCNC: 30.6 MG/DL

## 2018-01-28 PROCEDURE — 71045 X-RAY EXAM CHEST 1 VIEW: CPT | Performed by: RADIOLOGY

## 2018-01-28 PROCEDURE — 83735 ASSAY OF MAGNESIUM: CPT | Performed by: INTERNAL MEDICINE

## 2018-01-28 PROCEDURE — 84484 ASSAY OF TROPONIN QUANT: CPT | Performed by: INTERNAL MEDICINE

## 2018-01-28 PROCEDURE — 82962 GLUCOSE BLOOD TEST: CPT

## 2018-01-28 PROCEDURE — 71045 X-RAY EXAM CHEST 1 VIEW: CPT

## 2018-01-28 PROCEDURE — 80061 LIPID PANEL: CPT | Performed by: INTERNAL MEDICINE

## 2018-01-29 ENCOUNTER — APPOINTMENT (OUTPATIENT)
Dept: GENERAL RADIOLOGY | Facility: HOSPITAL | Age: 83
End: 2018-01-29

## 2018-01-29 LAB
027 TOXIN: NORMAL
ANION GAP SERPL CALCULATED.3IONS-SCNC: 6 MMOL/L (ref 3.6–11.2)
BASOPHILS # BLD AUTO: 0 10*3/MM3 (ref 0–0.3)
BASOPHILS NFR BLD AUTO: 0 % (ref 0–2)
BUN BLD-MCNC: 11 MG/DL (ref 7–21)
BUN/CREAT SERPL: 19.6 (ref 7–25)
C DIFF TOX GENS STL QL NAA+PROBE: NEGATIVE
CALCIUM SPEC-SCNC: 8.4 MG/DL (ref 7.7–10)
CHLORIDE SERPL-SCNC: 101 MMOL/L (ref 99–112)
CO2 SERPL-SCNC: 29 MMOL/L (ref 24.3–31.9)
CREAT BLD-MCNC: 0.56 MG/DL (ref 0.43–1.29)
DEPRECATED RDW RBC AUTO: 44.7 FL (ref 37–54)
EOSINOPHIL # BLD AUTO: 0.14 10*3/MM3 (ref 0–0.7)
EOSINOPHIL NFR BLD AUTO: 2.8 % (ref 0–7)
ERYTHROCYTE [DISTWIDTH] IN BLOOD BY AUTOMATED COUNT: 14 % (ref 11.5–14.5)
GFR SERPL CREATININE-BSD FRML MDRD: 104 ML/MIN/1.73
GLUCOSE BLD-MCNC: 92 MG/DL (ref 70–110)
GLUCOSE BLDC GLUCOMTR-MCNC: 104 MG/DL (ref 70–130)
GLUCOSE BLDC GLUCOMTR-MCNC: 120 MG/DL (ref 70–130)
GLUCOSE BLDC GLUCOMTR-MCNC: 77 MG/DL (ref 70–130)
GLUCOSE BLDC GLUCOMTR-MCNC: 81 MG/DL (ref 70–130)
HCT VFR BLD AUTO: 37.2 % (ref 37–47)
HGB BLD-MCNC: 11.9 G/DL (ref 12–16)
IMM GRANULOCYTES # BLD: 0.03 10*3/MM3 (ref 0–0.03)
IMM GRANULOCYTES NFR BLD: 0.6 % (ref 0–0.5)
LYMPHOCYTES # BLD AUTO: 1.45 10*3/MM3 (ref 1–3)
LYMPHOCYTES NFR BLD AUTO: 28.5 % (ref 16–46)
MAGNESIUM SERPL-MCNC: 1.9 MG/DL (ref 1.7–2.6)
MCH RBC QN AUTO: 28.2 PG (ref 27–33)
MCHC RBC AUTO-ENTMCNC: 32 G/DL (ref 33–37)
MCV RBC AUTO: 88.2 FL (ref 80–94)
MONOCYTES # BLD AUTO: 0.76 10*3/MM3 (ref 0.1–0.9)
MONOCYTES NFR BLD AUTO: 15 % (ref 0–12)
NEUTROPHILS # BLD AUTO: 2.7 10*3/MM3 (ref 1.4–6.5)
NEUTROPHILS NFR BLD AUTO: 53.1 % (ref 40–75)
OSMOLALITY SERPL CALC.SUM OF ELEC: 271 MOSM/KG (ref 273–305)
PHOSPHATE SERPL-MCNC: 2.2 MG/DL (ref 2.7–4.5)
PLATELET # BLD AUTO: 183 10*3/MM3 (ref 130–400)
PMV BLD AUTO: 9.7 FL (ref 6–10)
POTASSIUM BLD-SCNC: 3 MMOL/L (ref 3.5–5.3)
PREALB SERPL-MCNC: 21 MG/DL (ref 9–32)
RBC # BLD AUTO: 4.22 10*6/MM3 (ref 4.2–5.4)
SODIUM BLD-SCNC: 136 MMOL/L (ref 135–153)
WBC NRBC COR # BLD: 5.08 10*3/MM3 (ref 4.5–12.5)

## 2018-01-29 PROCEDURE — 71045 X-RAY EXAM CHEST 1 VIEW: CPT | Performed by: RADIOLOGY

## 2018-01-29 PROCEDURE — 82962 GLUCOSE BLOOD TEST: CPT

## 2018-01-29 PROCEDURE — 80048 BASIC METABOLIC PNL TOTAL CA: CPT | Performed by: INTERNAL MEDICINE

## 2018-01-29 PROCEDURE — 85025 COMPLETE CBC W/AUTO DIFF WBC: CPT | Performed by: INTERNAL MEDICINE

## 2018-01-29 PROCEDURE — 97163 PT EVAL HIGH COMPLEX 45 MIN: CPT

## 2018-01-29 PROCEDURE — 92610 EVALUATE SWALLOWING FUNCTION: CPT

## 2018-01-29 PROCEDURE — 87493 C DIFF AMPLIFIED PROBE: CPT | Performed by: PHYSICIAN ASSISTANT

## 2018-01-29 PROCEDURE — 84100 ASSAY OF PHOSPHORUS: CPT | Performed by: INTERNAL MEDICINE

## 2018-01-29 PROCEDURE — 71045 X-RAY EXAM CHEST 1 VIEW: CPT

## 2018-01-29 PROCEDURE — 97530 THERAPEUTIC ACTIVITIES: CPT

## 2018-01-29 PROCEDURE — 83735 ASSAY OF MAGNESIUM: CPT | Performed by: INTERNAL MEDICINE

## 2018-01-29 NOTE — PROGRESS NOTES
Discharge Planning Assessment   [place]     Patient Name: Amy Do  MRN: [id number]  Today's Date: 1/29/2018    Admit Date: 1/22/2018          Discharge Needs Assessment     None            Discharge Plan       01/29/18 0656    Case Management/Social Work Plan    Patient/Family In Agreement With Plan yes    Final Note    Final Note Pt was discharged to [facility]. No other needs identified.        Discharge Placement     Facility/Agency Request Status Selected? Address Phone Number Fax Number    [facility] Pending - No Request Sent     [address] [address] [phone]        Expected Discharge Date and Time     Expected Discharge Date Expected Discharge Time    Jan 27, 2018               Demographic Summary     None            Functional Status     None            Psychosocial     None            Abuse/Neglect     None            Legal     None            Substance Abuse     None            Patient Forms     None          Matilde Ge

## 2018-01-30 ENCOUNTER — APPOINTMENT (OUTPATIENT)
Dept: GENERAL RADIOLOGY | Facility: HOSPITAL | Age: 83
End: 2018-01-30

## 2018-01-30 LAB
BACTERIA SPEC AEROBE CULT: ABNORMAL
BACTERIA SPEC AEROBE CULT: ABNORMAL
GLUCOSE BLDC GLUCOMTR-MCNC: 102 MG/DL (ref 70–130)
GLUCOSE BLDC GLUCOMTR-MCNC: 89 MG/DL (ref 70–130)
GLUCOSE BLDC GLUCOMTR-MCNC: 95 MG/DL (ref 70–130)
GLUCOSE BLDC GLUCOMTR-MCNC: 99 MG/DL (ref 70–130)

## 2018-01-30 PROCEDURE — 97530 THERAPEUTIC ACTIVITIES: CPT

## 2018-01-30 PROCEDURE — 97116 GAIT TRAINING THERAPY: CPT

## 2018-01-30 PROCEDURE — 82962 GLUCOSE BLOOD TEST: CPT

## 2018-01-31 NOTE — ANESTHESIA PREPROCEDURE EVALUATION
Anesthesia Evaluation     Patient summary reviewed and Nursing notes reviewed   no history of anesthetic complications:  NPO Solid Status: > 8 hours  NPO Liquid Status: > 8 hours     Airway   Mallampati: II  TM distance: >3 FB  Neck ROM: limited  no difficulty expected  Dental - normal exam   (+) edentulous    Pulmonary - normal exam   (+) pneumonia improving , COPD moderate, shortness of breath, decreased breath sounds,   Cardiovascular - normal exam  Exercise tolerance: poor (<4 METS)    ECG reviewed  Rhythm: regular  Rate: normal    (+) hypertension, orthopnea, YOUNGER, hyperlipidemia      Neuro/Psych  (+) weakness, psychiatric history, dementia, poor historian.,     GI/Hepatic/Renal/Endo    (+)  GERD,     Musculoskeletal     (+) gait problem,   Abdominal  - normal exam    Bowel sounds: normal.   Substance History - negative use     OB/GYN negative ob/gyn ROS         Other   (+) arthritis                                             Anesthesia Plan    ASA 4     general   total IV anesthesia  intravenous induction   Anesthetic plan and risks discussed with patient.    Plan discussed with CRNA.

## 2018-01-31 NOTE — ANESTHESIA POSTPROCEDURE EVALUATION
Patient: Amy Do    Procedure Summary     Date Anesthesia Start Anesthesia Stop Room / Location    01/31/18 1540 1611 [facility] / [facility]       Procedure Diagnosis Surgeon Provider    ESOPHAGOGASTRODUODENOSCOPY (N/A Esophagus); COLONOSCOPY (N/A ) No diagnosis on file. Elias Wilkins III, MD Stallings Depa, DO          Anesthesia Type: general  Last vitals  BP   112/69 (01/31/18 1643)   Temp   98.7 °F (37.1 °C) (01/31/18 1613)   Pulse   82 (01/31/18 1643)   Resp   20 (01/31/18 1643)     SpO2   100 % (01/31/18 1643)     Post Anesthesia Care and Evaluation    Patient location during evaluation: PHASE II  Patient participation: complete - patient participated  Level of consciousness: awake and alert and awake  Pain score: 0  Pain management: adequate  Airway patency: patent  Anesthetic complications: No anesthetic complications  PONV Status: controlled  Cardiovascular status: acceptable  Respiratory status: acceptable and room air  Hydration status: acceptable  No anesthesia care post op

## 2018-04-20 NOTE — PROGRESS NOTES
: 1935    Chief Complaint   Patient presents with   • Heartburn       Amy Do is a 83 y.o. female who presents to the office today as a follow up appointment regarding Heartburn    History of Present Illness:  She had recent [facility] admission due to multiple health complaints dated -2018. On 2018 Dr. Wilkins performed EGD with dilatation and colonoscopy which revealed:  -Small hiatal hernia  -Reflux esophagitis  -Mild gastritis  -Diverticulosis, sigmoid      Pathology showed normal antrum biopsies and focal acute inflammation of the esophagus without intestinal metaplasia.     Patient had previously complained of nausea, right quadrant abdominal pain, and diarrhea after eating along with dysphagia symptoms at times. During 's hospital evaluation, son had reported that patient has history of corkscrew esophagus and required an esophageal dilatation a few years ago by Dr. Norman. Now, she reports that vomiting seems to only occur when she eats cold food or drinks cold liquids. She drinks a lot of coffee, several cups per day. She has some control of her bowels with intermittent fecal incontinence. Denies current abdominal pain. Appetite is decreased and food is not appealing to her most of the time. Diarrhea is present usually immediately after eating. No rectal bleeding or melena.     Labs 18:  CRP normal    WBC 4.50 - 12.50 10*3/mm3 4.07     RBC 4.20 - 5.40 10*6/mm3 3.81     Hemoglobin 12.0 - 16.0 g/dL 10.9     Hematocrit 37.0 - 47.0 % 33.7       KUB 18 normal, no constipation noted.    Review of Systems   Constitutional: Positive for fatigue.   HENT: Positive for trouble swallowing.    Respiratory: Positive for shortness of breath.    Cardiovascular: Positive for chest pain.   Gastrointestinal: Positive for abdominal pain, anal bleeding, blood in stool, constipation, diarrhea, nausea and vomiting.   Neurological: Negative for dizziness, light-headedness and headaches.        Past Medical History:   Diagnosis Date   • Allergic rhinitis    • Arthritis    • COPD (chronic obstructive pulmonary disease)    • Dementia    • Depression    • Diverticulitis    • Dysphagia    • GERD (gastroesophageal reflux disease)    • History of transfusion    • Hypertension        Past Surgical History:   Procedure Laterality Date   • APPENDECTOMY     • CHOLECYSTECTOMY     • COLONOSCOPY N/A 1/31/2018    Procedure: COLONOSCOPY;  Surgeon: Elias Wilkins III, MD;  Location: [facility];  Service:    • DENTAL PROCEDURE     • ENDOSCOPY N/A 1/31/2018    Procedure: ESOPHAGOGASTRODUODENOSCOPY;  Surgeon: Elias Wilkins III, MD;  Location: [facility];  Service:    • HEMORRHOIDECTOMY     • HYSTERECTOMY     • TONSILLECTOMY         Family History   Problem Relation Age of Onset   • No Known Problems Mother    • No Known Problems Father    • No Known Problems Sister    • No Known Problems Brother    • No Known Problems Son    • No Known Problems Daughter    • No Known Problems Maternal Grandmother    • No Known Problems Maternal Grandfather    • No Known Problems Paternal Grandmother    • No Known Problems Paternal Grandfather    • No Known Problems Cousin    • Rheum arthritis Neg Hx    • Osteoarthritis Neg Hx    • Asthma Neg Hx    • Diabetes Neg Hx    • Heart failure Neg Hx    • Hyperlipidemia Neg Hx    • Hypertension Neg Hx    • Migraines Neg Hx    • Rashes / Skin problems Neg Hx    • Seizures Neg Hx    • Stroke Neg Hx    • Thyroid disease Neg Hx        Social History     Social History   • Marital status:      Social History Main Topics   • Smoking status: Former Smoker     Types: Cigarettes   • Smokeless tobacco: Never Used   • Alcohol use No   • Drug use: No   • Sexual activity: Defer     Other Topics Concern   • Not on file       Current Outpatient Prescriptions:   •  acetaminophen (TYLENOL) 500 MG tablet, Take 500 mg by mouth Every 4 (Four) Hours As Needed for Mild Pain  or Fever., Disp: , Rfl:    •  acidophilus (FLORANEX) tablet tablet, Take 1 tablet by mouth Daily., Disp: , Rfl:   •  amLODIPine (NORVASC) 5 MG tablet, Take 5 mg by mouth daily., Disp: , Rfl:   •  ARTIFICIAL TEAR SOLUTION OP, Apply 1 drop to eye Every 4 (Four) Hours As Needed (dryness)., Disp: , Rfl:   •  aspirin 81 MG EC tablet, Take 81 mg by mouth daily., Disp: , Rfl:   •  atorvastatin (LIPITOR) 20 MG tablet, Take 1 tablet by mouth Every Night., Disp: , Rfl:   •  azelastine (OPTIVAR) 0.05 % ophthalmic solution, Administer 1 drop to both eyes 2 (Two) Times a Day., Disp: , Rfl:   •  bisacodyl (DULCOLAX) 10 MG suppository, Insert 10 mg into the rectum Daily As Needed for Constipation., Disp: , Rfl:   •  busPIRone (BUSPAR) 5 MG tablet, Take 5 mg by mouth 2 (Two) Times a Day., Disp: , Rfl:   •  cholecalciferol (VITAMIN D3) 1000 units tablet, Take 1,000 Units by mouth Daily., Disp: , Rfl:   •  Cyanocobalamin (VITAMIN B 12 PO), Take 1,000 mcg by mouth daily., Disp: , Rfl:   •  docusate sodium (COLACE) 250 MG capsule, Take 250 mg by mouth Every 12 (Twelve) Hours As Needed for Constipation., Disp: , Rfl:   •  donepezil (ARICEPT) 10 MG tablet, Take 10 mg by mouth every night., Disp: , Rfl:   •  escitalopram (LEXAPRO) 5 MG tablet, Take 5 mg by mouth Daily., Disp: , Rfl:   •  ferrous sulfate 325 (65 FE) MG tablet, Take 325 mg by mouth 2 (two) times a day., Disp: , Rfl:   •  fluticasone-salmeterol (ADVAIR DISKUS) 250-50 MCG/DOSE DISKUS, Inhale 1 puff 2 (Two) Times a Day., Disp: , Rfl:   •  guaiFENesin 200 MG tablet, Take 400 mg by mouth 2 (two) times a day., Disp: , Rfl:   •  Heparin Sodium, Porcine, (HEPARIN, PORCINE,) 5000 UNIT/ML injection, Inject 1 mL under the skin Every 12 (Twelve) Hours., Disp: , Rfl:   •  ipratropium-albuterol (DUO-NEB) 0.5-2.5 mg/mL nebulizer, Take 3 mL by nebulization Every 6 (Six) Hours., Disp: 360 mL, Rfl:   •  lactulose (CHRONULAC) 10 GM/15ML solution, Take 10 g by mouth Daily As Needed (constipation)., Disp: , Rfl:   •   "leflunomide (ARAVA) 20 MG tablet, Take 20 mg by mouth daily., Disp: , Rfl:   •  linaclotide (LINZESS) 72 MCG capsule capsule, Take 72 mcg by mouth 3 (Three) Times a Week., Disp: , Rfl:   •  loratadine (CLARITIN) 10 MG tablet, Take 10 mg by mouth Every Night., Disp: , Rfl:   •  memantine (NAMENDA) 10 MG tablet, Take 10 mg by mouth 2 (two) times a day., Disp: , Rfl:   •  nitroglycerin (NITROSTAT) 0.4 MG SL tablet, Place 0.4 mg under the tongue every 5 (five) minutes as needed for chest pain. Take no more than 3 doses in 15 minutes., Disp: , Rfl:   •  ondansetron (ZOFRAN) 4 MG tablet, Take 4 mg by mouth Every 6 (Six) Hours As Needed for Nausea or Vomiting., Disp: , Rfl:   •  pantoprazole (PROTONIX) 40 MG EC tablet, Take 40 mg by mouth 2 (two) times a day., Disp: , Rfl:   •  phenylephrine-mineral oil-petrolatum (HEMORRHOIDAL) 0.25-14-74.9 % ointment hemorrhoidal ointment, Insert 1 application into the rectum Every 6 (Six) Hours As Needed (hemorrhoids)., Disp: , Rfl:   •  polyethylene glycol (MIRALAX) packet, Take 17 g by mouth Daily As Needed (constipation)., Disp: , Rfl:   •  predniSONE (DELTASONE) 20 MG tablet, Take 1 tablet by mouth Daily With Breakfast., Disp: , Rfl:     Allergies:   Contrast dye; Penicillins; and Sulfa antibiotics    Vitals:  /73   Pulse 77   Ht 157.5 cm (62\")   Wt 51.3 kg (113 lb)   BMI 20.67 kg/m²     Physical Exam   Constitutional: She is oriented to person, place, and time. She appears well-developed. No distress.   Wearing sunglasses   HENT:   Head: Normocephalic and atraumatic.   Nose: Nose normal.   Mouth/Throat: Oropharynx is clear and moist.   Eyes: Conjunctivae are normal. Right eye exhibits no discharge. Left eye exhibits no discharge. No scleral icterus.   Neck: Normal range of motion. No JVD present.   Cardiovascular: Normal rate, regular rhythm and normal heart sounds.  Exam reveals no gallop and no friction rub.    No murmur heard.  Pulmonary/Chest: Effort normal and breath " sounds normal. No respiratory distress. She has no wheezes. She has no rales. She exhibits no tenderness.   Abdominal: Soft. Bowel sounds are normal. She exhibits no mass. There is no tenderness.   Musculoskeletal: She exhibits no deformity.   Mobility via wheelchair   Neurological: She is alert and oriented to person, place, and time. Coordination normal.   Skin: Skin is warm and dry. No rash noted. She is not diaphoretic. No erythema.   Psychiatric: She has a normal mood and affect. Her behavior is normal. Judgment and thought content normal.   Vitals reviewed.      Assessment/Plan:  1. Intractable vomiting with nausea, unspecified vomiting type    2. Gastroesophageal reflux disease, esophagitis presence not specified    3. History of hiatal hernia    4. Weight loss, abnormal    5. Poor appetite    6. Diarrhea, unspecified type    7. Incontinence of feces with fecal urgency      KUB was performed on the day of her colonoscopy and she was prepped at the time, we were checking for overflow diarrhea and she will need another film. With her medication list consisting of opioid pain medication and iron supplements, this does seem likely. She does have constipation medications on her list but they are all marked as needed. If abdominal film is normal, will need stool studies.     Esophagram is appropriate for monitoring for persistent narrowing, esophageal abnormalities or large hiatal hernia. I will add Zantac 300 mg BID to her current regimen of Protonix 40 mg BID. She will start drinking 1/2 decaf coffee as an effort to decrease caffeienated beverages.     Orders Placed This Encounter   Procedures   • FL Esophagram Complete   • XR Abdomen Flat & Upright     New Medications Ordered This Visit   Medications   • raNITIdine (ZANTAC) 300 MG tablet     Sig: Take 1 tablet by mouth 2 (Two) Times a Day.     Dispense:  60 tablet     Refill:  5           Return in about 3 weeks (around 5/11/2018) for discussion of  results.      Electronically signed 4/25/2018 1:34 PM  Tory Chong PA-C, [facility]

## 2018-05-22 NOTE — TELEPHONE ENCOUNTER
Spoke with patients nurse and she stated that she is doing ok. She said she does not believe that patient needs follow up right now but if she does they will call and schedule it.

## 2018-08-07 NOTE — MBS/VFSS/FEES
Outpatient Speech Language Pathology   Adult Swallow Initial Evaluation   [place]   OUTPATIENT MODIFIED BARIUM SWALLOW STUDY     Patient Name: Amy Do  : 1935  MRN: [id number]  Today's Date: 2018      Amy Do  presents to the radiology suite this am from [facility] to participate in an instrumental MBS to evaluate safety/efficacy of swallowing fnx, determine safest/least restrictive diet. She is accompanied by a facility staff member. D/w facility SLP via telephone pt status this am w/ report pt w/ h/o corkscrew esophagus, regurgitation w/ meals. Ms. Do denies any recent h/o PNA.     Current po diet of mechanical soft consistency, thin liquids.     No recent chest xray available for review.     Pt is observed on ra w/o complications.     Risks and benefits of the procedure are explained w/ pt verbalizing understanding/agreement to participate. Proceed per protocol.     Pt is positioned upright and centered in a chair to accept multiple po presentations of solid cracker, puree, honey thick, nectar thick, and thin liquids via spoon, cup and straw, along w/ whole placebo pill in puree. Pt is able to self feed.     All views are from the lateral plane.     Facial/oral structures are symmetrical upon observation w/o lingual deviation upon protrusion. Oral mucosa are moist, pink and clean. Secretions are clear, thin and well controlled. OROM/MIKI is wfl to imitate oral postures. Gag is not assessed. Volitional cough is adequate in intensity, clear in quality, nonproductive. Vocal quality is adequate in intensity, clear in quality w/ intelligible speech. Pt is a/a and cooperative to particpate. She is oriented to person, place, and time, follows simple directives, and participates in simple conversational exchanges.     Upon po presentations, adequate bolus anticipation w/ good labial seal for bolus clearance via spoon bowl, cup rim stability and suction via straw. Bolus formation,  manipulation, and control are wfl w/ rotary mastication pattern. A-p transit is timely w/o oral residue. Linguavelar seal is adequate w/o significant premature spillage. No laryngeal penetration or aspiration is evidenced before the swallow.     Pharyngeal swallow is timely w/ adequate hyolaryngeal elevation and epiglottic inversion. Pharyngeal contraction is adequate w/o significant residue. No laryngeal penetration or aspiration evidenced during or after the swallow.     Pt is able to manipulate and swallow whole placebo pill in puree w/o difficulty.     Full esophageal sweep reveals a corkscrew appearance at the distal end of the esophagus w/ poor motility and retrograde flow observed. This is intermittent observed to flow back into the pharynx w/ multiple consecutive thin liquid presentations, cleared again w/ spontaneous swallow w/o laryngeal penetration or aspiration evidenced.      Impression: Per this evaluation, Ms. Do presents w/ a primary esophageal dysphagia, wfl oropharyngeal swallow w/o laryngeal penetration or aspiration evidenced. Esophageal dysphagia is characterized by corkscrew appearance, poor motility and retrograde flow. She is felt to most benefit from a modified po diet of mechanical soft consistencies per primary esophageal dysphagia and decreased motility. Please consider pt candidacy for smaller, more frequent meals.     Recommendations:   1. Mechanical soft consistency diet, whole meats, thin liquids.   2. Meds whole in puree.  3. Upright and centered for all po intake.    4. Strict DU precautions.   5. Oral care protocol.   6. Smaller more frequent meals.   7. Small bites and sips.     D/w pt and facility staff member results and recommendations w/ verbal understanding and agreement.     Thank you for allowing me to participate in the care of your patient-  Nancy Marquez M.A., CCC-SLP     Visit Date: 08/07/2018   Patient Active Problem List   Diagnosis   • Pneumonia of left lower  lobe due to infectious organism (CMS/HCC)   • Dislodged gastrostomy tube (CMS/HCC)        Past Medical History:   Diagnosis Date   • Allergic rhinitis    • Arthritis    • COPD (chronic obstructive pulmonary disease) (CMS/HCC)    • Dementia    • Depression    • Diverticulitis    • Dysphagia    • GERD (gastroesophageal reflux disease)    • History of transfusion    • Hypertension       Past Surgical History:   Procedure Laterality Date   • APPENDECTOMY     • CHOLECYSTECTOMY     • COLONOSCOPY N/A 1/31/2018    Procedure: COLONOSCOPY;  Surgeon: Elias Wilkins III, MD;  Location: [facility] OR;  Service:    • DENTAL PROCEDURE     • ENDOSCOPY N/A 1/31/2018    Procedure: ESOPHAGOGASTRODUODENOSCOPY;  Surgeon: Elias Wilkins III, MD;  Location: [facility] OR;  Service:    • HEMORRHOIDECTOMY     • HYSTERECTOMY     • TONSILLECTOMY       Visit Dx:     ICD-10-CM ICD-9-CM   1. Dysphagia, unspecified type R13.10 787.20           OP SLP Education     Row Name 08/07/18 1102       Education    Barriers to Learning No barriers identified  -CM    Education Provided Described results of evaluation;Patient expressed understanding of evaluation  -CM    Assessed Learning motivation  -CM    Learning Motivation Moderate  -CM    Learning Method Explanation  -CM    Teaching Response Verbalized understanding  -CM      User Key  (r) = Recorded By, (t) = Taken By, (c) = Cosigned By    Initials Name Effective Dates    CM Nancy Marquez MA,CCC-SLP 04/03/18 -         Time Calculation:      Therapy Charges for Today     Code Description Service Date Service Provider Modifiers Qty    79778425570 HC ST SWALLOWING CURRENT STATUS 8/7/2018 Nancy Marquez MA,CCC-SLP GN, [facility]    91067638120 HC ST SWALLOWING PROJECTED 8/7/2018 Nancy Marquez MA,CCC-SLP , [facility]    32313320681 HC ST SWALLOWING DISCHARGE 8/7/2018 Nancy Marquez MA,CCC-SLP GN,  1    [id number] HC ST MOTION FLUORO EVAL SWALLOW 8 8/7/2018  Nancy Marquez MA,CCC-SLP GN 1        SLP G-Codes  SLP NOMS Used?: Yes  Functional Limitations: Swallowing  Swallow Current Status (): 0 percent impaired, limited or restricted  Swallow Goal Status (): 0 percent impaired, limited or restricted  Swallow Discharge Status (): 0 percent impaired, limited or restricted    Nancy Marquez MA,CCC-SLP  8/7/2018

## 2018-10-03 NOTE — ED NOTES
EMS called and reports that they are tied up on runs at this time.     Yris Hogan RN  10/03/18 8839

## 2018-10-03 NOTE — ED NOTES
"Pt sent from the nursing home for a fall, patient complaining of pain in the back of her head and \"all my bones all over my body hurt\".       Fatou Melissa RN  10/03/18 4330    "

## 2018-10-03 NOTE — ED NOTES
Pt placed on bed pan again at this time, pt requesting food and r tech to be called as well.      Becki Giles  10/03/18 5104

## 2018-10-03 NOTE — ED NOTES
Pt placed onto bed pan at this time, pt placed with call light and was advised to ring when finished     Becki Giles  10/03/18 1055

## 2018-10-03 NOTE — ED NOTES
Pt noted to have another loose black bowel movement, pt placed in clean brief at this time and repositioned for comfort     Becki Giles  10/03/18 5606

## 2018-10-03 NOTE — ED PROVIDER NOTES
"Subjective   83-year-old white female nursing home resident presents after fall.  Patient states that she got up to go to the bathroom and fell backwards.  She denied any syncope or loss of consciousness.  She complains of \"hurting all over.\"  To get her to be more specific on any particular area of injury.  She states that she hit her head and landed on her lower back and buttocks.  She says that she has chronic diffuse pain.  She denied any chest pain, shortness of breath, abdominal pain, numbness, weakness or other complaints.        Fall   Mechanism of injury: fall    Injury location: Hurts all over.  Incident location:  Nursing home  Arrived directly from scene: yes    Protective equipment: none    Suspicion of alcohol use: no    Suspicion of drug use: no        Review of Systems   All other systems reviewed and are negative.      Past Medical History:   Diagnosis Date   • Allergic rhinitis    • Arthritis    • COPD (chronic obstructive pulmonary disease) (CMS/[facility])    • Dementia    • Depression    • Diverticulitis    • Dysphagia    • GERD (gastroesophageal reflux disease)    • History of transfusion    • Hypertension        Allergies   Allergen Reactions   • Contrast Dye    • Penicillins    • Sulfa Antibiotics        Past Surgical History:   Procedure Laterality Date   • APPENDECTOMY     • CHOLECYSTECTOMY     • COLONOSCOPY N/A 1/31/2018    Procedure: COLONOSCOPY;  Surgeon: Elias Wilkins III, MD;  Location: [facility];  Service:    • DENTAL PROCEDURE     • ENDOSCOPY N/A 1/31/2018    Procedure: ESOPHAGOGASTRODUODENOSCOPY;  Surgeon: Elias Wilkins III, MD;  Location: [facility];  Service:    • HEMORRHOIDECTOMY     • HYSTERECTOMY     • TONSILLECTOMY         Family History   Problem Relation Age of Onset   • No Known Problems Mother    • No Known Problems Father    • No Known Problems Sister    • No Known Problems Brother    • No Known Problems Son    • No Known Problems Daughter    • No Known Problems " Maternal Grandmother    • No Known Problems Maternal Grandfather    • No Known Problems Paternal Grandmother    • No Known Problems Paternal Grandfather    • No Known Problems Cousin    • Rheum arthritis Neg Hx    • Osteoarthritis Neg Hx    • Asthma Neg Hx    • Diabetes Neg Hx    • Heart failure Neg Hx    • Hyperlipidemia Neg Hx    • Hypertension Neg Hx    • Migraines Neg Hx    • Rashes / Skin problems Neg Hx    • Seizures Neg Hx    • Stroke Neg Hx    • Thyroid disease Neg Hx        Social History     Social History   • Marital status:      Social History Main Topics   • Smoking status: Former Smoker     Types: Cigarettes   • Smokeless tobacco: Never Used   • Alcohol use No   • Drug use: No   • Sexual activity: Defer     Other Topics Concern   • Not on file           Objective   Physical Exam   Constitutional: She is oriented to person, place, and time. She appears well-developed and well-nourished.   HENT:   Head: Normocephalic and atraumatic. Head is without abrasion and without contusion.   Mouth/Throat: Oropharynx is clear and moist.   Eyes: Pupils are equal, round, and reactive to light. Conjunctivae and EOM are normal.   Neck: Normal range of motion. Neck supple.   Cardiovascular: Normal rate, regular rhythm and normal heart sounds.  Exam reveals no gallop and no friction rub.    No murmur heard.  Pulmonary/Chest: Effort normal and breath sounds normal. No respiratory distress. She has no wheezes. She has no rales.   Abdominal: Soft. Bowel sounds are normal. She exhibits no distension. There is no tenderness.   Musculoskeletal: She exhibits deformity.        Left shoulder: Normal.        Right elbow: Normal.       Left elbow: Normal. No tenderness found.        Right wrist: Normal.        Left wrist: Normal.        Right hip: Normal.        Left hip: Normal.        Right knee: Normal.        Left knee: Normal.        Right ankle: Normal.        Left ankle: Normal.        Lumbar back: She exhibits  tenderness (Mild). She exhibits no deformity.   Mild tenderness in the lumbar midline.  Pelvis stable and nontender.   Neurological: She is alert and oriented to person, place, and time. She has normal strength. No sensory deficit.   Skin: Skin is warm and dry.   Psychiatric: She has a normal mood and affect.   Nursing note and vitals reviewed.      Procedures       Ct Head Without Contrast    Result Date: 10/3/2018  Narrative: CT HEAD WO CONTRAST-  CLINICAL INDICATION: fall     COMPARISON: 1/22/2018  TECHNIQUE: Axial images of the brain were obtained with out intravenous contrast.  Reformatted images were created in the sagittal and coronal planes.  DOSE: 1095.9 mGy.cm  Radiation dose reduction techniques were utilized per ALARA protocol. Automated exposure control was initiated through either or [employer]([facility]) or [employer] software packages by  protocol.     FINDINGS: Today's study shows no mass, hemorrhage, or midline shift. The ventricles, cisterns, and sulci are unremarkable. There is no hydrocephalus. There is no evidence of acute ischemia. I do not see epidural or subdural hematoma. The gray-white differentiation is appropriate. The bone window setting images show no destructive calvarial lesion or acute calvarial fracture. The posterior fossa is unremarkable.          Impression: No acute intracranial pathology. Nothing is seen on this exam to specifically account for the patient's symptoms.  This report was finalized on 10/3/2018 10:05 AM by Dr. Fabio Zheng MD.      Xr Pelvis 1 Or 2 View    Result Date: 10/3/2018  Narrative: EXAMINATION: XR PELVIS 1 OR 2 VW-  CLINICAL INDICATION: fall     COMPARISON: 5/8/2016 image of the right hip.  FINDINGS: One view of the pelvis demonstrate sclerotic change in the femoral heads bilaterally. This certainly may represent avascular necrosis.  No fracture or dislocation is seen  The femoral heads are not flattened.      Impression: Sclerotic change in the femoral  heads bilaterally, very possibly representing avascular necrosis. No acute fracture is seen  This report was finalized on 10/3/2018 10:16 AM by Dr. Fabio Zheng MD.      Xr Spine Lumbar 4+ View    Result Date: 10/3/2018  Narrative: EXAMINATION: XR SPINE LUMBAR 4+ VW-  CLINICAL INDICATION: fall     COMPARISON: 5/8/2016  FINDINGS: 5 views of the lumbar spine, including obliques show disc space narrowing at multiple levels  Several spurs are present  Vertebral body heights are stable  There is evidence of atherosclerotic vascular disease.      Impression: Arthritic change. Overall appearance very similar to the previous exam. No new lumbar abnormality  This report was finalized on 10/3/2018 10:15 AM by Dr. Fabio Zheng MD.          ED Course  ED Course as of Oct 03 1333   Wed Oct 03, 2018   1331 Patient continues to complain of diffuse pain.  Imaging shows no acute fractures.  We will plan pain medicine and discharged back to nursing home.  [BC]      ED Course User Index  [BC] Sander Castro MD                  MDM  Number of Diagnoses or Management Options  Acute low back pain without sciatica, unspecified back pain laterality:   Fall, initial encounter:   Injury of head, initial encounter:      Amount and/or Complexity of Data Reviewed  Tests in the radiology section of CPT®: reviewed          Final diagnoses:   Acute low back pain without sciatica, unspecified back pain laterality   Fall, initial encounter   Injury of head, initial encounter            Sander Castro MD  10/03/18 1333

## 2018-10-03 NOTE — ED NOTES
Assigned to Rm 111; Patient back from CT at this time; assisted to bedpan with small BM noted with urine.  Call light within reach; resp even and non labored; no complaints voiced will continue to monitor patient for any changes.     Yris Hogan, RN  10/03/18 2176

## 2018-10-03 NOTE — ED NOTES
Changed pt sheets and applied clean brief after having small loose bowel movement      Becki Giles  10/03/18 1675

## 2018-10-03 NOTE — ED NOTES
Pt resting quietly on stretcher with continuing complaint of pain.  Pt AAOx4 with no resp distress noted, respirations even and unlabored.  Pt denies any needs at this time.  Skin PWD.  Pt family at bedside. Will continue to monitor and follow plan of care.  Bed rails up x2, bed in lowest position, call light in reach.       Fatou Melissa, RN  10/03/18 1016

## 2019-01-01 ENCOUNTER — APPOINTMENT (OUTPATIENT)
Dept: GENERAL RADIOLOGY | Facility: HOSPITAL | Age: 84
End: 2019-01-01

## 2019-01-01 ENCOUNTER — HOSPITAL ENCOUNTER (EMERGENCY)
Facility: HOSPITAL | Age: 84
Discharge: HOME | End: 2019-01-30
Attending: EMERGENCY MEDICINE | Admitting: EMERGENCY MEDICINE

## 2019-01-01 ENCOUNTER — HOSPITAL ENCOUNTER (INPATIENT)
Facility: HOSPITAL | Age: 84
LOS: 1 days | End: 2019-01-31
Attending: EMERGENCY MEDICINE | Admitting: INTERNAL MEDICINE

## 2019-01-01 ENCOUNTER — APPOINTMENT (OUTPATIENT)
Dept: CT IMAGING | Facility: HOSPITAL | Age: 84
End: 2019-01-01

## 2019-01-01 VITALS
OXYGEN SATURATION: 100 % | RESPIRATION RATE: 18 BRPM | DIASTOLIC BLOOD PRESSURE: 77 MMHG | TEMPERATURE: 97.6 F | HEART RATE: 82 BPM | SYSTOLIC BLOOD PRESSURE: 115 MMHG | WEIGHT: 110 LBS | HEIGHT: 62 IN | BODY MASS INDEX: 20.24 KG/M2

## 2019-01-01 DIAGNOSIS — N39.0 ACUTE UTI: ICD-10-CM

## 2019-01-01 DIAGNOSIS — N28.9 RENAL INSUFFICIENCY: Primary | ICD-10-CM

## 2019-01-01 DIAGNOSIS — J18.9 PNEUMONIA OF RIGHT LOWER LOBE DUE TO INFECTIOUS ORGANISM: Primary | ICD-10-CM

## 2019-01-01 LAB
A-A DO2: 103.1 MMHG (ref 0–300)
ALBUMIN SERPL-MCNC: 2.4 G/DL (ref 3.4–4.8)
ALBUMIN SERPL-MCNC: 2.7 G/DL (ref 3.4–4.8)
ALBUMIN/GLOB SERPL: 1 G/DL (ref 1.5–2.5)
ALBUMIN/GLOB SERPL: 1.2 G/DL (ref 1.5–2.5)
ALP SERPL-CCNC: 198 U/L (ref 35–104)
ALP SERPL-CCNC: 205 U/L (ref 35–104)
ALT SERPL W P-5'-P-CCNC: 125 U/L (ref 10–36)
ALT SERPL W P-5'-P-CCNC: 156 U/L (ref 10–36)
ANION GAP SERPL CALCULATED.3IONS-SCNC: 12 MMOL/L (ref 3.6–11.2)
ANION GAP SERPL CALCULATED.3IONS-SCNC: 8.3 MMOL/L (ref 3.6–11.2)
ANISOCYTOSIS BLD QL: ABNORMAL
ARTERIAL PATENCY WRIST A: ABNORMAL
AST SERPL-CCNC: 182 U/L (ref 10–30)
AST SERPL-CCNC: 346 U/L (ref 10–30)
ATMOSPHERIC PRESS: 736 MMHG
BACTERIA UR QL AUTO: ABNORMAL /HPF
BASE EXCESS BLDA CALC-SCNC: -4.1 MMOL/L
BASOPHILS # BLD AUTO: 0 10*3/MM3 (ref 0–0.3)
BASOPHILS NFR BLD AUTO: 0 % (ref 0–2)
BDY SITE: ABNORMAL
BILIRUB SERPL-MCNC: 0.4 MG/DL (ref 0.2–1.8)
BILIRUB SERPL-MCNC: 1.4 MG/DL (ref 0.2–1.8)
BILIRUB UR QL STRIP: ABNORMAL
BNP SERPL-MCNC: 207 PG/ML (ref 0–100)
BODY TEMPERATURE: 98.6 C
BUN BLD-MCNC: 38 MG/DL (ref 7–21)
BUN BLD-MCNC: 47 MG/DL (ref 7–21)
BUN/CREAT SERPL: 22.9 (ref 7–25)
BUN/CREAT SERPL: 22.9 (ref 7–25)
CALCIUM SPEC-SCNC: 8.1 MG/DL (ref 7.7–10)
CALCIUM SPEC-SCNC: 8.6 MG/DL (ref 7.7–10)
CHLORIDE SERPL-SCNC: 105 MMOL/L (ref 99–112)
CHLORIDE SERPL-SCNC: 113 MMOL/L (ref 99–112)
CLARITY UR: ABNORMAL
CO2 SERPL-SCNC: 17 MMOL/L (ref 24.3–31.9)
CO2 SERPL-SCNC: 25.7 MMOL/L (ref 24.3–31.9)
COHGB MFR BLD: 2.5 % (ref 0–5)
COLOR UR: ABNORMAL
CREAT BLD-MCNC: 1.66 MG/DL (ref 0.43–1.29)
CREAT BLD-MCNC: 2.05 MG/DL (ref 0.43–1.29)
D-LACTATE SERPL-SCNC: 4.7 MMOL/L (ref 0.5–2)
DEPRECATED RDW RBC AUTO: 47.6 FL (ref 37–54)
DEPRECATED RDW RBC AUTO: 49.6 FL (ref 37–54)
EOSINOPHIL # BLD AUTO: 0 10*3/MM3 (ref 0–0.7)
EOSINOPHIL NFR BLD AUTO: 0 % (ref 0–7)
ERYTHROCYTE [DISTWIDTH] IN BLOOD BY AUTOMATED COUNT: 15.1 % (ref 11.5–14.5)
ERYTHROCYTE [DISTWIDTH] IN BLOOD BY AUTOMATED COUNT: 15.8 % (ref 11.5–14.5)
FLUAV AG NPH QL: NEGATIVE
FLUBV AG NPH QL IA: NEGATIVE
GFR SERPL CREATININE-BSD FRML MDRD: 23 ML/MIN/1.73
GFR SERPL CREATININE-BSD FRML MDRD: 30 ML/MIN/1.73
GLOBULIN UR ELPH-MCNC: 2.3 GM/DL
GLOBULIN UR ELPH-MCNC: 2.3 GM/DL
GLUCOSE BLD-MCNC: 18 MG/DL (ref 70–110)
GLUCOSE BLD-MCNC: 83 MG/DL (ref 70–110)
GLUCOSE BLDC GLUCOMTR-MCNC: 253 MG/DL (ref 70–130)
GLUCOSE UR STRIP-MCNC: NEGATIVE MG/DL
HCO3 BLDA-SCNC: 19.8 MMOL/L (ref 22–26)
HCT VFR BLD AUTO: 30.3 % (ref 37–47)
HCT VFR BLD AUTO: 39.4 % (ref 37–47)
HCT VFR BLD CALC: 34 % (ref 37–47)
HGB BLD-MCNC: 12.8 G/DL (ref 12–16)
HGB BLD-MCNC: 9.7 G/DL (ref 12–16)
HGB BLDA-MCNC: 11.7 G/DL (ref 12–16)
HGB UR QL STRIP.AUTO: ABNORMAL
HOLD SPECIMEN: NORMAL
HOROWITZ INDEX BLD+IHG-RTO: 28 %
HYALINE CASTS UR QL AUTO: ABNORMAL /LPF
HYPOCHROMIA BLD QL: ABNORMAL
IMM GRANULOCYTES # BLD AUTO: 0.02 10*3/MM3 (ref 0–0.03)
IMM GRANULOCYTES NFR BLD AUTO: 0.2 % (ref 0–0.5)
KETONES UR QL STRIP: ABNORMAL
LEUKOCYTE ESTERASE UR QL STRIP.AUTO: ABNORMAL
LYMPHOCYTES # BLD AUTO: 0.78 10*3/MM3 (ref 1–3)
LYMPHOCYTES # BLD MANUAL: 0.88 10*3/MM3 (ref 1–3)
LYMPHOCYTES NFR BLD AUTO: 6.7 % (ref 16–46)
LYMPHOCYTES NFR BLD MANUAL: 14 % (ref 16–46)
LYMPHOCYTES NFR BLD MANUAL: 2 % (ref 0–12)
MAGNESIUM SERPL-MCNC: 3.4 MG/DL (ref 1.7–2.6)
MCH RBC QN AUTO: 29.1 PG (ref 27–33)
MCH RBC QN AUTO: 29.9 PG (ref 27–33)
MCHC RBC AUTO-ENTMCNC: 32 G/DL (ref 33–37)
MCHC RBC AUTO-ENTMCNC: 32.5 G/DL (ref 33–37)
MCV RBC AUTO: 91 FL (ref 80–94)
MCV RBC AUTO: 92.1 FL (ref 80–94)
METHGB BLD QL: 0.3 % (ref 0–3)
MODALITY: ABNORMAL
MONOCYTES # BLD AUTO: 0.13 10*3/MM3 (ref 0.1–0.9)
MONOCYTES # BLD AUTO: 0.45 10*3/MM3 (ref 0.1–0.9)
MONOCYTES NFR BLD AUTO: 3.9 % (ref 0–12)
NEUTROPHILS # BLD AUTO: 10.32 10*3/MM3 (ref 1.4–6.5)
NEUTROPHILS # BLD AUTO: 5.31 10*3/MM3 (ref 1.4–6.5)
NEUTROPHILS NFR BLD AUTO: 89.2 % (ref 40–75)
NEUTROPHILS NFR BLD MANUAL: 84 % (ref 40–75)
NITRITE UR QL STRIP: NEGATIVE
NRBC SPEC MANUAL: 1 /100 WBC (ref 0–0)
OSMOLALITY SERPL CALC.SUM OF ELEC: 285.7 MOSM/KG (ref 273–305)
OSMOLALITY SERPL CALC.SUM OF ELEC: 290.9 MOSM/KG (ref 273–305)
OXYHGB MFR BLDV: 80 % (ref 85–100)
PCO2 BLDA: 32.1 MM HG (ref 35–45)
PH BLDA: 7.41 PH UNITS (ref 7.35–7.45)
PH UR STRIP.AUTO: <=5 [PH] (ref 5–8)
PLAT MORPH BLD: NORMAL
PLATELET # BLD AUTO: 226 10*3/MM3 (ref 130–400)
PLATELET # BLD AUTO: 239 10*3/MM3 (ref 130–400)
PMV BLD AUTO: 9.2 FL (ref 6–10)
PMV BLD AUTO: 9.8 FL (ref 6–10)
PO2 BLDA: 51.9 MM HG (ref 80–100)
POTASSIUM BLD-SCNC: 4.4 MMOL/L (ref 3.5–5.3)
POTASSIUM BLD-SCNC: 4.6 MMOL/L (ref 3.5–5.3)
PROT SERPL-MCNC: 4.7 G/DL (ref 6–8)
PROT SERPL-MCNC: 5 G/DL (ref 6–8)
PROT UR QL STRIP: ABNORMAL
RBC # BLD AUTO: 3.33 10*6/MM3 (ref 4.2–5.4)
RBC # BLD AUTO: 4.28 10*6/MM3 (ref 4.2–5.4)
RBC # UR: ABNORMAL /HPF
REF LAB TEST METHOD: ABNORMAL
SAO2 % BLDCOA: 82.3 % (ref 90–100)
SCAN SLIDE: NORMAL
SODIUM BLD-SCNC: 139 MMOL/L (ref 135–153)
SODIUM BLD-SCNC: 142 MMOL/L (ref 135–153)
SP GR UR STRIP: 1.02 (ref 1–1.03)
SQUAMOUS #/AREA URNS HPF: ABNORMAL /HPF
TROPONIN I SERPL-MCNC: 0.05 NG/ML
UROBILINOGEN UR QL STRIP: ABNORMAL
WBC NRBC COR # BLD: 11.57 10*3/MM3 (ref 4.5–12.5)
WBC NRBC COR # BLD: 6.32 10*3/MM3 (ref 4.5–12.5)
WBC UR QL AUTO: ABNORMAL /HPF

## 2019-01-01 PROCEDURE — 94799 UNLISTED PULMONARY SVC/PX: CPT

## 2019-01-01 PROCEDURE — 71045 X-RAY EXAM CHEST 1 VIEW: CPT

## 2019-01-01 PROCEDURE — 31500 INSERT EMERGENCY AIRWAY: CPT

## 2019-01-01 PROCEDURE — 80053 COMPREHEN METABOLIC PANEL: CPT | Performed by: EMERGENCY MEDICINE

## 2019-01-01 PROCEDURE — 83605 ASSAY OF LACTIC ACID: CPT | Performed by: EMERGENCY MEDICINE

## 2019-01-01 PROCEDURE — 85007 BL SMEAR W/DIFF WBC COUNT: CPT | Performed by: EMERGENCY MEDICINE

## 2019-01-01 PROCEDURE — 82962 GLUCOSE BLOOD TEST: CPT

## 2019-01-01 PROCEDURE — 99291 CRITICAL CARE FIRST HOUR: CPT

## 2019-01-01 PROCEDURE — 71045 X-RAY EXAM CHEST 1 VIEW: CPT | Performed by: RADIOLOGY

## 2019-01-01 PROCEDURE — 87040 BLOOD CULTURE FOR BACTERIA: CPT | Performed by: EMERGENCY MEDICINE

## 2019-01-01 PROCEDURE — 83880 ASSAY OF NATRIURETIC PEPTIDE: CPT | Performed by: EMERGENCY MEDICINE

## 2019-01-01 PROCEDURE — 85025 COMPLETE CBC W/AUTO DIFF WBC: CPT | Performed by: EMERGENCY MEDICINE

## 2019-01-01 PROCEDURE — 85025 COMPLETE CBC W/AUTO DIFF WBC: CPT | Performed by: PHYSICIAN ASSISTANT

## 2019-01-01 PROCEDURE — 87804 INFLUENZA ASSAY W/OPTIC: CPT | Performed by: EMERGENCY MEDICINE

## 2019-01-01 PROCEDURE — 80053 COMPREHEN METABOLIC PANEL: CPT | Performed by: PHYSICIAN ASSISTANT

## 2019-01-01 PROCEDURE — 25010000002 LORAZEPAM PER 2 MG

## 2019-01-01 PROCEDURE — 82375 ASSAY CARBOXYHB QUANT: CPT | Performed by: EMERGENCY MEDICINE

## 2019-01-01 PROCEDURE — 96360 HYDRATION IV INFUSION INIT: CPT

## 2019-01-01 PROCEDURE — 74176 CT ABD & PELVIS W/O CONTRAST: CPT

## 2019-01-01 PROCEDURE — 5A1935Z RESPIRATORY VENTILATION, LESS THAN 24 CONSECUTIVE HOURS: ICD-10-PCS | Performed by: EMERGENCY MEDICINE

## 2019-01-01 PROCEDURE — 84484 ASSAY OF TROPONIN QUANT: CPT | Performed by: EMERGENCY MEDICINE

## 2019-01-01 PROCEDURE — 82805 BLOOD GASES W/O2 SATURATION: CPT | Performed by: EMERGENCY MEDICINE

## 2019-01-01 PROCEDURE — 94002 VENT MGMT INPAT INIT DAY: CPT

## 2019-01-01 PROCEDURE — 74176 CT ABD & PELVIS W/O CONTRAST: CPT | Performed by: RADIOLOGY

## 2019-01-01 PROCEDURE — 93010 ELECTROCARDIOGRAM REPORT: CPT | Performed by: INTERNAL MEDICINE

## 2019-01-01 PROCEDURE — 83735 ASSAY OF MAGNESIUM: CPT | Performed by: EMERGENCY MEDICINE

## 2019-01-01 PROCEDURE — 93005 ELECTROCARDIOGRAM TRACING: CPT | Performed by: EMERGENCY MEDICINE

## 2019-01-01 PROCEDURE — 83050 HGB METHEMOGLOBIN QUAN: CPT | Performed by: EMERGENCY MEDICINE

## 2019-01-01 PROCEDURE — 36415 COLL VENOUS BLD VENIPUNCTURE: CPT

## 2019-01-01 PROCEDURE — 0BH17EZ INSERTION OF ENDOTRACHEAL AIRWAY INTO TRACHEA, VIA NATURAL OR ARTIFICIAL OPENING: ICD-10-PCS | Performed by: EMERGENCY MEDICINE

## 2019-01-01 PROCEDURE — 94640 AIRWAY INHALATION TREATMENT: CPT

## 2019-01-01 PROCEDURE — 81001 URINALYSIS AUTO W/SCOPE: CPT | Performed by: PHYSICIAN ASSISTANT

## 2019-01-01 PROCEDURE — 99284 EMERGENCY DEPT VISIT MOD MDM: CPT

## 2019-01-01 PROCEDURE — 36600 WITHDRAWAL OF ARTERIAL BLOOD: CPT | Performed by: EMERGENCY MEDICINE

## 2019-01-01 RX ORDER — ASPIRIN 81 MG/1
81 TABLET, CHEWABLE ORAL DAILY
Status: CANCELLED | OUTPATIENT
Start: 2019-02-01

## 2019-01-01 RX ORDER — DOCUSATE SODIUM 100 MG/1
100 CAPSULE, LIQUID FILLED ORAL 2 TIMES DAILY PRN
Status: CANCELLED | OUTPATIENT
Start: 2019-01-01

## 2019-01-01 RX ORDER — LORAZEPAM 2 MG/ML
0.25 INJECTION INTRAMUSCULAR ONCE
Status: COMPLETED | OUTPATIENT
Start: 2019-01-01 | End: 2019-01-01

## 2019-01-01 RX ORDER — HYDROCODONE BITARTRATE AND ACETAMINOPHEN 5; 325 MG/1; MG/1
1 TABLET ORAL 3 TIMES DAILY
Status: CANCELLED | OUTPATIENT
Start: 2019-01-01

## 2019-01-01 RX ORDER — ATORVASTATIN CALCIUM 20 MG/1
20 TABLET, FILM COATED ORAL NIGHTLY
Status: CANCELLED | OUTPATIENT
Start: 2019-01-01

## 2019-01-01 RX ORDER — NITROGLYCERIN 0.4 MG/1
0.4 TABLET SUBLINGUAL
Status: CANCELLED | OUTPATIENT
Start: 2019-01-01

## 2019-01-01 RX ORDER — CYANOCOBALAMIN 1000 UG/ML
1000 INJECTION, SOLUTION INTRAMUSCULAR; SUBCUTANEOUS
COMMUNITY

## 2019-01-01 RX ORDER — MEMANTINE HYDROCHLORIDE 10 MG/1
10 TABLET ORAL 2 TIMES DAILY
Status: CANCELLED | OUTPATIENT
Start: 2019-01-01

## 2019-01-01 RX ORDER — PREDNISONE 20 MG/1
20 TABLET ORAL DAILY
COMMUNITY

## 2019-01-01 RX ORDER — METOPROLOL SUCCINATE 25 MG/1
12.5 TABLET, EXTENDED RELEASE ORAL DAILY
Status: CANCELLED | OUTPATIENT
Start: 2019-02-01

## 2019-01-01 RX ORDER — PANTOPRAZOLE SODIUM 40 MG/1
40 TABLET, DELAYED RELEASE ORAL EVERY MORNING
Status: CANCELLED | OUTPATIENT
Start: 2019-02-01

## 2019-01-01 RX ORDER — FERROUS SULFATE 300 MG/5ML
300 LIQUID (ML) ORAL 2 TIMES DAILY
Status: CANCELLED | OUTPATIENT
Start: 2019-01-01

## 2019-01-01 RX ORDER — DEXTROSE MONOHYDRATE 25 G/50ML
25 INJECTION, SOLUTION INTRAVENOUS ONCE
Status: COMPLETED | OUTPATIENT
Start: 2019-01-01 | End: 2019-01-01

## 2019-01-01 RX ORDER — KETOTIFEN FUMARATE 0.35 MG/ML
1 SOLUTION/ DROPS OPHTHALMIC 2 TIMES DAILY
Status: CANCELLED | OUTPATIENT
Start: 2019-01-01

## 2019-01-01 RX ORDER — BUSPIRONE HYDROCHLORIDE 5 MG/1
5 TABLET ORAL DAILY
COMMUNITY

## 2019-01-01 RX ORDER — FAMOTIDINE 20 MG/1
40 TABLET, FILM COATED ORAL 2 TIMES DAILY
Status: CANCELLED | OUTPATIENT
Start: 2019-01-01

## 2019-01-01 RX ORDER — HEPARIN SODIUM 5000 [USP'U]/ML
5000 INJECTION, SOLUTION INTRAVENOUS; SUBCUTANEOUS EVERY 12 HOURS SCHEDULED
Status: DISCONTINUED | OUTPATIENT
Start: 2019-01-01 | End: 2019-01-01 | Stop reason: HOSPADM

## 2019-01-01 RX ORDER — PREDNISONE 20 MG/1
20 TABLET ORAL DAILY
Status: CANCELLED | OUTPATIENT
Start: 2019-02-01

## 2019-01-01 RX ORDER — ACETAMINOPHEN 500 MG
500 TABLET ORAL EVERY 4 HOURS PRN
Status: CANCELLED | OUTPATIENT
Start: 2019-01-01

## 2019-01-01 RX ORDER — CARBOXYMETHYLCELLULOSE SODIUM 5 MG/ML
1 SOLUTION/ DROPS OPHTHALMIC 3 TIMES DAILY PRN
Status: CANCELLED | OUTPATIENT
Start: 2019-01-01

## 2019-01-01 RX ORDER — BISACODYL 10 MG
10 SUPPOSITORY, RECTAL RECTAL DAILY PRN
Status: CANCELLED | OUTPATIENT
Start: 2019-01-01

## 2019-01-01 RX ORDER — IPRATROPIUM BROMIDE AND ALBUTEROL SULFATE 2.5; .5 MG/3ML; MG/3ML
3 SOLUTION RESPIRATORY (INHALATION) ONCE
Status: COMPLETED | OUTPATIENT
Start: 2019-01-01 | End: 2019-01-01

## 2019-01-01 RX ORDER — DOXYCYCLINE 100 MG/1
100 CAPSULE ORAL 2 TIMES DAILY
Status: CANCELLED | OUTPATIENT
Start: 2019-01-01 | End: 2019-02-08

## 2019-01-01 RX ORDER — FERROUS SULFATE 325(65) MG
325 TABLET ORAL
Status: CANCELLED | OUTPATIENT
Start: 2019-02-01

## 2019-01-01 RX ORDER — HYDROCODONE BITARTRATE AND ACETAMINOPHEN 5; 325 MG/1; MG/1
1 TABLET ORAL 3 TIMES DAILY
COMMUNITY

## 2019-01-01 RX ORDER — OMEGA-3S/DHA/EPA/FISH OIL/D3 300MG-1000
1000 CAPSULE ORAL DAILY
Status: CANCELLED | OUTPATIENT
Start: 2019-02-01

## 2019-01-01 RX ORDER — CYANOCOBALAMIN 1000 UG/ML
1000 INJECTION, SOLUTION INTRAMUSCULAR; SUBCUTANEOUS
Status: CANCELLED | OUTPATIENT
Start: 2019-01-01

## 2019-01-01 RX ORDER — OMEPRAZOLE 40 MG/1
40 CAPSULE, DELAYED RELEASE ORAL DAILY
COMMUNITY

## 2019-01-01 RX ORDER — DONEPEZIL HYDROCHLORIDE 5 MG/1
10 TABLET, FILM COATED ORAL NIGHTLY
Status: CANCELLED | OUTPATIENT
Start: 2019-01-01

## 2019-01-01 RX ORDER — LEFLUNOMIDE 20 MG/1
20 TABLET ORAL DAILY
Status: CANCELLED | OUTPATIENT
Start: 2019-02-01

## 2019-01-01 RX ORDER — SODIUM CHLORIDE 9 MG/ML
100 INJECTION, SOLUTION INTRAVENOUS CONTINUOUS
Status: DISCONTINUED | OUTPATIENT
Start: 2019-01-01 | End: 2019-02-01 | Stop reason: HOSPADM

## 2019-01-01 RX ORDER — AMLODIPINE BESYLATE 5 MG/1
5 TABLET ORAL DAILY
Status: CANCELLED | OUTPATIENT
Start: 2019-02-01

## 2019-01-01 RX ORDER — FERROUS SULFATE 220 (44)/5
330 ELIXIR ORAL 2 TIMES DAILY
COMMUNITY

## 2019-01-01 RX ORDER — POLYETHYLENE GLYCOL 3350 17 G/17G
17 POWDER, FOR SOLUTION ORAL DAILY PRN
Status: CANCELLED | OUTPATIENT
Start: 2019-01-01

## 2019-01-01 RX ORDER — L. ACIDOPHILUS/L.BULGARICUS 1MM CELL
1 TABLET ORAL DAILY
Status: CANCELLED | OUTPATIENT
Start: 2019-02-01

## 2019-01-01 RX ORDER — DOXYCYCLINE HYCLATE 100 MG/1
100 CAPSULE ORAL 2 TIMES DAILY
COMMUNITY

## 2019-01-01 RX ORDER — ASPIRIN 81 MG/1
81 TABLET, CHEWABLE ORAL DAILY
COMMUNITY

## 2019-01-01 RX ORDER — MINERAL OIL AND PETROLATUM 150; 830 MG/G; MG/G
OINTMENT OPHTHALMIC EVERY 4 HOURS PRN
Status: CANCELLED | OUTPATIENT
Start: 2019-01-01

## 2019-01-01 RX ORDER — NITROGLYCERIN 0.4 MG/1
0.4 TABLET SUBLINGUAL
Status: DISCONTINUED | OUTPATIENT
Start: 2019-01-01 | End: 2019-01-01 | Stop reason: HOSPADM

## 2019-01-01 RX ORDER — SODIUM CHLORIDE 0.9 % (FLUSH) 0.9 %
3-10 SYRINGE (ML) INJECTION AS NEEDED
Status: DISCONTINUED | OUTPATIENT
Start: 2019-01-01 | End: 2019-02-01 | Stop reason: HOSPADM

## 2019-01-01 RX ORDER — DOCUSATE SODIUM 100 MG/1
100 CAPSULE, LIQUID FILLED ORAL 2 TIMES DAILY PRN
COMMUNITY

## 2019-01-01 RX ORDER — SODIUM CHLORIDE 0.9 % (FLUSH) 0.9 %
3 SYRINGE (ML) INJECTION EVERY 12 HOURS SCHEDULED
Status: DISCONTINUED | OUTPATIENT
Start: 2019-01-01 | End: 2019-02-01 | Stop reason: HOSPADM

## 2019-01-01 RX ORDER — BUSPIRONE HYDROCHLORIDE 5 MG/1
5 TABLET ORAL DAILY
Status: CANCELLED | OUTPATIENT
Start: 2019-02-01

## 2019-01-01 RX ORDER — ESCITALOPRAM OXALATE 5 MG/1
2.5 TABLET ORAL DAILY
Status: CANCELLED | OUTPATIENT
Start: 2019-02-01

## 2019-01-01 RX ORDER — LORAZEPAM 2 MG/ML
INJECTION INTRAMUSCULAR
Status: COMPLETED
Start: 2019-01-01 | End: 2019-01-01

## 2019-01-01 RX ORDER — MEMANTINE HYDROCHLORIDE 5 MG/1
5 TABLET ORAL EVERY 12 HOURS SCHEDULED
Status: CANCELLED | OUTPATIENT
Start: 2019-01-01

## 2019-01-01 RX ADMIN — METRONIDAZOLE 500 MG: 500 INJECTION, SOLUTION INTRAVENOUS at 18:57

## 2019-01-01 RX ADMIN — DEXTROSE MONOHYDRATE 25 G: 25 INJECTION, SOLUTION INTRAVENOUS at 18:15

## 2019-01-01 RX ADMIN — LORAZEPAM 0.25 MG: 2 INJECTION INTRAMUSCULAR at 20:04

## 2019-01-01 RX ADMIN — SODIUM CHLORIDE 1000 ML: 9 INJECTION, SOLUTION INTRAVENOUS at 18:15

## 2019-01-01 RX ADMIN — SODIUM CHLORIDE 1428 ML: 9 INJECTION, SOLUTION INTRAVENOUS at 19:41

## 2019-01-01 RX ADMIN — LORAZEPAM 0.25 MG: 2 INJECTION INTRAMUSCULAR; INTRAVENOUS at 20:04

## 2019-01-01 RX ADMIN — DEXTROSE MONOHYDRATE 25 G: 25 INJECTION, SOLUTION INTRAVENOUS at 18:16

## 2019-01-01 RX ADMIN — SODIUM CHLORIDE 1000 ML: 9 INJECTION, SOLUTION INTRAVENOUS at 17:31

## 2019-01-01 RX ADMIN — IPRATROPIUM BROMIDE AND ALBUTEROL SULFATE 3 ML: .5; 3 SOLUTION RESPIRATORY (INHALATION) at 16:42

## 2019-01-31 PROBLEM — A41.9 SEPSIS (HCC): Status: ACTIVE | Noted: 2019-01-01

## 2019-01-31 PROBLEM — J18.9 PNEUMONIA OF RIGHT LOWER LOBE DUE TO INFECTIOUS ORGANISM: Status: ACTIVE | Noted: 2019-01-01

## 2019-02-01 VITALS
HEIGHT: 62 IN | HEART RATE: 84 BPM | DIASTOLIC BLOOD PRESSURE: 46 MMHG | BODY MASS INDEX: 19.64 KG/M2 | TEMPERATURE: 96.2 F | OXYGEN SATURATION: 93 % | WEIGHT: 106.7 LBS | RESPIRATION RATE: 12 BRPM | SYSTOLIC BLOOD PRESSURE: 79 MMHG

## 2019-02-01 NOTE — DISCHARGE SUMMARY
"Discharge Summary:    Date of Admission: 2019  Date of Discharge:  2019    PCP: Jak Clinton MD    Please note that this is a Death Summary    Date of death: 2019  Time of death:     DISCHARGE DIAGNOSIS  -Septic shock  -Acute kidney injury  -Acute hypoxic respiratory failure  -Life-threatening hypoglycemia that resolved with supplementation  -Acute transaminitis    SECONDARY DIAGNOSES  Past Medical History:   Diagnosis Date   • Allergic rhinitis    • Arthritis    • COPD (chronic obstructive pulmonary disease) (CMS/HCC)    • Dementia    • Depression    • Diverticulitis    • Dysphagia    • GERD (gastroesophageal reflux disease)    • History of transfusion    • Hypertension      CONSULTS   None    PROCEDURES PERFORMED  Intubation with subsequent extubation in the emergency department    HOSPITAL COURSE  Patient is a 83 y.o. female presented to [facility] complaining of abdominal pain and shortness of breath.  Please see the admitting history and physical for further details.      The patient arrived to the telemetry unit from the emergency department.  Unfortunately the patient passed away approximately 20 minutes after arrival to the telemetry unit.  The official time of death as noted by ANTWAN Arteaga/house supervisor was  .      CONDITION ON DISCHARGE  .      VITAL SIGNS  BP (!) 79/46 (BP Location: Right arm, Patient Position: Lying)   Pulse 84   Temp 96.2 °F (35.7 °C) (Rectal)   Resp 12   Ht 157.5 cm (62.01\")   Wt 48.4 kg (106 lb 11.2 oz)   SpO2 93%   BMI 19.51 kg/m²   Objective  Not performed by me. Patient pronounced by Jenni Lau RN/House Supervisor    DISCHARGE DISPOSITION         Marisa Pardo DO  19  11:13 PM      "

## 2019-02-01 NOTE — H&P
Hospitalist History and Physical    Patient Identification:  Name: Amy Do  Age/Sex: 83 y.o. female  :  1935  MRN: [id number]         Primary Care Physician: Jak Clinton MD     Chief Complaint   Patient presents with   • Shortness of Breath       History of Present Illness  Patient is a 83 y.o. female presents with the following: shortness of breath    The patient is an 83-year-old nursing home patient who presented from her nursing home with severe shortness of breath ×2 days.  Please note that my history of present illness is obtained from chart review and per my discussion with the ED physician Dr. Justice.    Patient had apparently been complaining of abdominal pain in addition to cough and wheezing.  The patient's condition significantly worsened in the emergency department and she was briefly intubated.  After the patient's son and other family members discussed the situation, they requested for the patient to be extubated in the emergency department.    Chest x-ray revealed free air under the right diaphragm.  A CT scan of the abdomen had also been performed which revealed a possible perforated diverticulum.  The ED physician discussed via telephone with the general surgeon on-call who recommended conservative management. The patient received IV fluids and antibiotics while in the emergency department.    The decision was made for the patient to be admitted to the telemetry unit.  The patient was saturating in the 70s to 80s via high flow nasal cannula.  Patient received Ativan prior to leaving the emergency department.  Upon arrival to the telemetry floor, the patient's oxygen saturation was in the 60s.  The patient  approximately 20 minutes upon arrival to the telemetry unit.    The patient had already been pronounced by the house supervisor by the time I arrived on the floor and as such the patient was not examined.      Past History:  Past Medical History:   Diagnosis Date   •  Allergic rhinitis    • Arthritis    • COPD (chronic obstructive pulmonary disease) (CMS/[facility])    • Dementia    • Depression    • Diverticulitis    • Dysphagia    • GERD (gastroesophageal reflux disease)    • History of transfusion    • Hypertension      Past Surgical History:   Procedure Laterality Date   • APPENDECTOMY     • CHOLECYSTECTOMY     • COLONOSCOPY N/A 1/31/2018    Procedure: COLONOSCOPY;  Surgeon: Elias Wilkins III, MD;  Location: [facility];  Service:    • DENTAL PROCEDURE     • ENDOSCOPY N/A 1/31/2018    Procedure: ESOPHAGOGASTRODUODENOSCOPY;  Surgeon: Elias Wilkins III, MD;  Location: [facility];  Service:    • HEMORRHOIDECTOMY     • HYSTERECTOMY     • TONSILLECTOMY       Family History   Problem Relation Age of Onset   • No Known Problems Mother    • No Known Problems Father    • No Known Problems Sister    • No Known Problems Brother    • No Known Problems Son    • No Known Problems Daughter    • No Known Problems Maternal Grandmother    • No Known Problems Maternal Grandfather    • No Known Problems Paternal Grandmother    • No Known Problems Paternal Grandfather    • No Known Problems Cousin    • Rheum arthritis Neg Hx    • Osteoarthritis Neg Hx    • Asthma Neg Hx    • Diabetes Neg Hx    • Heart failure Neg Hx    • Hyperlipidemia Neg Hx    • Hypertension Neg Hx    • Migraines Neg Hx    • Rashes / Skin problems Neg Hx    • Seizures Neg Hx    • Stroke Neg Hx    • Thyroid disease Neg Hx      Social History     Tobacco Use   • Smoking status: Former Smoker     Types: Cigarettes   • Smokeless tobacco: Never Used   Substance Use Topics   • Alcohol use: No   • Drug use: No     No medications prior to admission.     Allergies: Contrast dye; Penicillins; and Sulfa antibiotics    Review of Systems:  Review of Systems   Unobtainable    Vital Signs  Temp:  [96.2 °F (35.7 °C)-97.5 °F (36.4 °C)] 96.2 °F (35.7 °C)  Heart Rate:  [73-99] 84  Resp:  [12-28] 12  BP: ()/(36-95) 79/46  FiO2 (%):  [100  %] 100 %  Body mass index is 19.51 kg/m².    Physical Exam:  Physical Exam  None performed.     Results Review:    Results from last 7 days   Lab Units 19  1621   PH, ARTERIAL pH units 7.407   PO2 ART mm Hg 51.9*   PCO2, ARTERIAL mm Hg 32.1*   HCO3 ART mmol/L 19.8*       Results from last 7 days   Lab Units 19  1713 19  1622   WBC 10*3/mm3 6.32 11.57   HEMOGLOBIN g/dL 12.8 9.7*   HEMATOCRIT % 39.4 30.3*   PLATELETS 10*3/mm3 239 226     Results from last 7 days   Lab Units 19  1713 19  1622   SODIUM mmol/L 142 139   POTASSIUM mmol/L 4.6 4.4   CHLORIDE mmol/L 113* 105   CO2 mmol/L 17.0* 25.7   BUN mg/dL 47* 38*   CREATININE mg/dL 2.05* 1.66*   CALCIUM mg/dL 8.6 8.1   GLUCOSE mg/dL 18* 83     Results from last 7 days   Lab Units 19  1713 19  1622   BILIRUBIN mg/dL 1.4 0.4   ALK PHOS U/L 198* 205*   AST (SGOT) U/L 182* 346*   ALT (SGPT) U/L 125* 156*         Results from last 7 days   Lab Units 19  1713   MAGNESIUM mg/dL 3.4*     Results from last 7 days   Lab Units 19  1713   TROPONIN I ng/mL 0.053*         Imaging:    Assessment/Plan     -Septic shock, present upon admission and secondary to acute diverticulitis with suspicion for perforation in addition to a possible right middle lobe and right lower lobe aspiration versus healthcare associated pneumonia    -Acute kidney injury secondary to above    -Acute hypoxic respiratory failure    -Lateral pleural effusions    -Life-threatening hypoglycemia that resolved with supplementation    -Acute transaminitis    -Severe hypoalbuminemia    Unfortunately, further workup and treatment was not started upon arrival to the telemetry floor as the patient  shortly upon arrival to [address] [place] [address]/Telemetry.        Marisa Pardo DO  19  11:04 PM

## 2019-02-01 NOTE — ED NOTES
The patient left ED at this time to inpatient bed assignment on 3 south room 325 via stretcher with ED tech and primary RN. Patient placed on 10 liters nasal canula for transport and will be placed back on high flow O2 upon arrival to floor. IV in left EJ is patent and infusing NS bolus and flagyl with no issues or s/s of infiltration. O2 saturation continues to be low. MD is aware. Patient belongings were sent with family.      Jacki Vale, RN  02/01/19 1199

## 2019-02-01 NOTE — ED NOTES
"Patient noted to be mildly restless, pulling at IV line in left EJ. The patients family asking for medication to relieve anxiety and to \"calm her nerves.\" Dr Justice made aware. New orders noted.     Jacki Vale RN  01/31/19 4565    "

## 2019-02-01 NOTE — NURSING NOTE
Home here to transport patient to [facility] in [facility] [facility]

## 2019-02-01 NOTE — ED PROVIDER NOTES
Subjective   Patient presents from nursing home with shortness of air.        Shortness of Breath   Severity:  Severe  Onset quality:  Gradual  Duration:  2 days  Timing:  Constant  Progression:  Worsening  Chronicity:  Recurrent  Relieved by:  Nothing  Worsened by:  Nothing  Ineffective treatments:  None tried  Associated symptoms: abdominal pain, cough and wheezing        Review of Systems   Constitutional: Positive for activity change and fatigue.   HENT: Negative.    Eyes: Negative.    Respiratory: Positive for cough, shortness of breath and wheezing.    Cardiovascular: Negative.    Gastrointestinal: Positive for abdominal pain.   Endocrine: Negative.    Genitourinary: Negative.    Musculoskeletal: Negative.    Allergic/Immunologic: Negative.    Neurological: Negative.    Hematological: Negative.    Psychiatric/Behavioral: Negative.        Past Medical History:   Diagnosis Date   • Allergic rhinitis    • Arthritis    • COPD (chronic obstructive pulmonary disease) (CMS/HCC)    • Dementia    • Depression    • Diverticulitis    • Dysphagia    • GERD (gastroesophageal reflux disease)    • History of transfusion    • Hypertension        Allergies   Allergen Reactions   • Contrast Dye    • Penicillins    • Sulfa Antibiotics        Past Surgical History:   Procedure Laterality Date   • APPENDECTOMY     • CHOLECYSTECTOMY     • COLONOSCOPY N/A 1/31/2018    Procedure: COLONOSCOPY;  Surgeon: Elias Wilkins III, MD;  [facility];  Service:    • DENTAL PROCEDURE     • ENDOSCOPY N/A 1/31/2018    Procedure: ESOPHAGOGASTRODUODENOSCOPY;  Surgeon: Elias Wilkins III, MD;  Location: [facility];  Service:    • HEMORRHOIDECTOMY     • HYSTERECTOMY     • TONSILLECTOMY         Family History   Problem Relation Age of Onset   • No Known Problems Mother    • No Known Problems Father    • No Known Problems Sister    • No Known Problems Brother    • No Known Problems Son    • No Known Problems Daughter    • No Known  Problems Maternal Grandmother    • No Known Problems Maternal Grandfather    • No Known Problems Paternal Grandmother    • No Known Problems Paternal Grandfather    • No Known Problems Cousin    • Rheum arthritis Neg Hx    • Osteoarthritis Neg Hx    • Asthma Neg Hx    • Diabetes Neg Hx    • Heart failure Neg Hx    • Hyperlipidemia Neg Hx    • Hypertension Neg Hx    • Migraines Neg Hx    • Rashes / Skin problems Neg Hx    • Seizures Neg Hx    • Stroke Neg Hx    • Thyroid disease Neg Hx        Social History     Socioeconomic History   • Marital status:      Spouse name: Not on file   • Number of children: Not on file   • Years of education: Not on file   • Highest education level: Not on file   Tobacco Use   • Smoking status: Former Smoker     Types: Cigarettes   • Smokeless tobacco: Never Used   Substance and Sexual Activity   • Alcohol use: No   • Drug use: No   • Sexual activity: Defer           Objective   Physical Exam   Constitutional: She appears well-developed and well-nourished. She appears toxic. She appears distressed.   HENT:   Head: Normocephalic and atraumatic.   Mouth/Throat: Oropharynx is clear and moist.   Eyes: Pupils are equal, round, and reactive to light.   Neck: Normal range of motion.   Cardiovascular: Normal heart sounds.   Pulmonary/Chest: Tachypnea noted. She is in respiratory distress. She has decreased breath sounds in the right upper field, the right middle field, the left upper field and the left middle field. She has wheezes in the right upper field and the left upper field. She has rales in the right upper field and the left upper field.   Abdominal: There is tenderness.   Musculoskeletal: Normal range of motion.        Right lower leg: Normal.        Left lower leg: Normal.   Skin: Skin is warm.   Psychiatric: Her mood appears anxious.   Nursing note and vitals reviewed.      Procedures           ED Course  ED Course as of Feb 05 1256   u Jan 31, 2019   1826 CXR free air  noted under right diaphragm  [ADÁN]   1827 Dr Becerril consulted re perf viscus, advises no surgery in light of present condition and DNR status  [ADÁN]      ED Course User Index  [ADÁN] Saji Justice MD                  [facility]      Final diagnoses:   Pneumonia of right lower lobe due to infectious organism (CMS/HCC)            Saji Justice MD  01/31/19 1943       Saji Justice MD  02/05/19 1501

## 2019-02-02 LAB
BACTERIA SPEC AEROBE CULT: ABNORMAL
GRAM STN SPEC: ABNORMAL

## 2019-02-05 LAB — BACTERIA SPEC AEROBE CULT: NORMAL

## 2019-07-09 NOTE — PROGRESS NOTES
THC Physician - Brief Progress Note  PERMANENT  01/23/2018 22:23    [facility]  [facility] - CCU - [facility] - [place], [place] ([facility])    TRAM WATERS    Date of Service 01/23/2018 22:23    HPI/Events of Note Generalized pain, normal for her. Wears saboxan patch at home. Restless and agitated. RN unable to provide a pain number. RN states nothing strong pls because her O2   We'll give low-dose hydrocodone wrote for 5 mg in conjunction with Tylenol.      Interventions Minor-Routine modifications to care plan (e.g. PRN medications for pain, fever)        Electronically Signed by: Finn Kiser) on 1/23/2018 10:24 PM   97

## (undated) DEVICE — TUBING, SUCTION, 1/4" X 20', STRAIGHT: Brand: MEDLINE INDUSTRIES, INC.

## (undated) DEVICE — THE BITE BLOCK MAXI, LATEX FREE STRAP IS USED TO PROTECT THE ENDOSCOPE INSERTION TUBE FROM BEING BITTEN BY THE PATIENT.

## (undated) DEVICE — GOWN,REINF,POLY,ECL,PP SLV,XL: Brand: MEDLINE

## (undated) DEVICE — SYR LUERLOK 30CC

## (undated) DEVICE — Device

## (undated) DEVICE — Device: Brand: ENDOGATOR

## (undated) DEVICE — SINGLE PORT MANIFOLD: Brand: NEPTUNE 2

## (undated) DEVICE — FRCP BX RADJAW4 NDL 2.8 240CM LG OG BX40

## (undated) DEVICE — CONN Y IRR DISP 1P/U

## (undated) DEVICE — Device: Brand: DEFENDO AIR/WATER/SUCTION AND BIOPSY VALVE